# Patient Record
Sex: MALE | Race: BLACK OR AFRICAN AMERICAN | NOT HISPANIC OR LATINO | Employment: OTHER | URBAN - METROPOLITAN AREA
[De-identification: names, ages, dates, MRNs, and addresses within clinical notes are randomized per-mention and may not be internally consistent; named-entity substitution may affect disease eponyms.]

---

## 2017-01-17 ENCOUNTER — OFFICE VISIT (OUTPATIENT)
Dept: MEDICAL GROUP | Facility: MEDICAL CENTER | Age: 52
End: 2017-01-17
Payer: MEDICARE

## 2017-01-17 VITALS
TEMPERATURE: 98.1 F | HEART RATE: 86 BPM | BODY MASS INDEX: 22.67 KG/M2 | SYSTOLIC BLOOD PRESSURE: 112 MMHG | WEIGHT: 167.4 LBS | OXYGEN SATURATION: 99 % | HEIGHT: 72 IN | DIASTOLIC BLOOD PRESSURE: 76 MMHG

## 2017-01-17 DIAGNOSIS — Z12.11 COLON CANCER SCREENING: ICD-10-CM

## 2017-01-17 DIAGNOSIS — E11.9 DIABETES MELLITUS WITHOUT COMPLICATION (HCC): ICD-10-CM

## 2017-01-17 DIAGNOSIS — G40.909 SEIZURE DISORDER (HCC): ICD-10-CM

## 2017-01-17 LAB — GLUCOSE BLD-MCNC: 112 MG/DL (ref 70–100)

## 2017-01-17 PROCEDURE — 82962 GLUCOSE BLOOD TEST: CPT | Performed by: NURSE PRACTITIONER

## 2017-01-17 PROCEDURE — 99213 OFFICE O/P EST LOW 20 MIN: CPT | Performed by: NURSE PRACTITIONER

## 2017-01-17 RX ORDER — LEVETIRACETAM 500 MG/1
500 TABLET ORAL 2 TIMES DAILY
Qty: 60 TAB | Refills: 1 | Status: SHIPPED | OUTPATIENT
Start: 2017-01-17 | End: 2017-10-26

## 2017-01-17 ASSESSMENT — PATIENT HEALTH QUESTIONNAIRE - PHQ9: CLINICAL INTERPRETATION OF PHQ2 SCORE: 0
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
## 2017-01-17 NOTE — MR AVS SNAPSHOT
Haile Chavez   2017 2:40 PM   Office Visit   MRN: [id number]    Department:  [address] [address]   Dept Phone:  [phone]    Description:  Male : 1965   Provider:  ROSALEE Arreola           Reason for Visit     Diabetes Mellitus refill lantus, metformin    Seizure refill keppra      Allergies as of 2017     Allergen Noted Reactions    Hctz 2012       Hyponatremia      Other Drug 2010   Nausea    All anti-inflammitories,  Headache and body aches.    Vicodin [Hydrocodone-Acetaminophen] 2007   Vomiting    Headaches    Amoxicillin 2010   Nausea    Headache and body aches      You were diagnosed with     Type 2 diabetes mellitus with hyperosmolarity without coma, unspecified long term insulin use status (CMS-HCC)   [8365959]       Seizure disorder (CMS-HCC)   [755774]       Colon cancer screening   [500120]         Vital Signs     Blood Pressure Pulse Temperature Height Weight Body Mass Index    112/76 mmHg 86 36.7 °C (98.1 °F) 1.829 m (6') 75.932 kg (167 lb 6.4 oz) 22.70 kg/m2    Oxygen Saturation Smoking Status                99% Current Every Day Smoker          Basic Information     Date Of Birth Sex Race Ethnicity Preferred Language    1965 Male Black or  Non- English      Your appointments     2017  2:20 PM   Established Patient with Librado Colin M.D.   [facility] [address]    [address]  [facility] [address]  [facility] [address]   [phone]           You will be receiving a confirmation call a few days before your appointment from our automated call confirmation system.              Problem List              ICD-10-CM Priority Class Noted - Resolved    Schizo affective schizophrenia (CMS-HCC) F25.0 High  2010 - Present    HTN (hypertension), benign I10 Medium  2010 - Present    Tuberculosis A15.9   2010 - Present    Hepatitis C B19.20 Medium  2010 - Present    Erosive  esophagitis K22.10   Unknown - Present    Leukopenia D72.819 Low  11/8/2010 - Present    DM (diabetes mellitus) (CMS-HCC) E11.9   12/6/2011 - Present    GERD (gastroesophageal reflux disease) K21.9 Low  7/14/2013 - Present    Encounter for long-term (current) use of other medications Z79.899   1/29/2015 - Present    Transaminitis R74.0 Medium  6/5/2016 - Present    Schizophrenia (CMS-HCC) F20.9   10/14/2016 - Present    Homeless Z59.0   10/14/2016 - Present    Seizure disorder (CMS-HCC) G40.909   1/17/2017 - Present      Health Maintenance        Date Due Completion Dates    IMM HEP B VACCINE (3 of 3 - Twinrix 3-Dose Series) 12/30/2012 7/30/2012, 2/27/2012, 1/23/2012    RETINAL SCREENING 1/30/2014 1/30/2013 (Done), 1/28/2012 (Done)    Override on 1/30/2013: Done (avni)    Override on 1/28/2012: Done    COLON CANCER SCREENING ANNUAL FIT 5/21/2015 5/21/2014, 5/1/2013    URINE ACR / MICROALBUMIN 7/22/2015 7/22/2014, 1/6/2014, 10/11/2013, 11/29/2012, 6/28/2012    DIABETES MONOFILAMENT / LE EXAM 11/14/2015 11/14/2014, 10/21/2013, 4/30/2013 (Done), 3/28/2012 (Done)    Override on 4/30/2013: Done    Override on 3/28/2012: Done    A1C SCREENING 4/14/2017 10/14/2016, 1/29/2015, 7/22/2014, 1/6/2014, 10/11/2013, 4/30/2013, 11/29/2012, 6/28/2012, 1/5/2012, 11/14/2011, 8/29/2011    FASTING LIPID PROFILE 10/15/2017 10/15/2016, 7/22/2014, 1/6/2014, 11/29/2012, 6/28/2012, 11/14/2011, 8/17/2011, 9/3/2010, 5/20/2010, 4/29/2010    SERUM CREATININE 10/15/2017 10/15/2016, 10/14/2016, 10/10/2016, 6/18/2016, 6/6/2016, 6/5/2016, 6/5/2016, 6/4/2016, 6/3/2016, 2/14/2015, 7/22/2014, 3/1/2014, 1/6/2014, 10/11/2013, 4/30/2013, 12/28/2012, 11/29/2012, 10/24/2012, 9/24/2012, 8/27/2012, 6/28/2012, 6/28/2012, 5/31/2012, 5/4/2012, 5/2/2012, 4/17/2012, 3/28/2012, 11/19/2011, 11/18/2011, 11/17/2011, 11/16/2011, 11/16/2011, 11/16/2011, 11/15/2011, 11/14/2011, 11/2/2011, 8/29/2011, 8/17/2011, 11/7/2010, 11/7/2010, 9/4/2010, 9/2/2010, 5/20/2010,  4/29/2010, 9/2/2006, 6/19/2006    IMM DTaP/Tdap/Td Vaccine (2 - Td) 5/24/2022 5/24/2012            Results     POCT Glucose      Component Value Standard Range & Units    Glucose - Accu-Ck 112 70 - 100 mg/dL                        Current Immunizations     Hep A/HEP B Combined Vaccine (TwinRix) 7/30/2012  9:00 AM, 2/27/2012    Hepatitis B Vaccine Non-Recombivax (Ped/Adol) 1/23/2012  3:22 PM    Influenza TIV (IM) 10/21/2013  2:03 PM, 12/28/2012, 11/14/2011  5:00 PM, 11/8/2010  1:31 PM    Influenza Vaccine Quad Inj (Pf) 10/15/2016  1:52 PM    Influenza Vaccine Quad Inj (Preserved) 11/14/2014    Pneumococcal polysaccharide vaccine (PPSV-23) 9/3/2010  9:10 PM    Tdap Vaccine 5/24/2012  8:45 AM      Below and/or attached are the medications your provider expects you to take. Review all of your home medications and newly ordered medications with your provider and/or pharmacist. Follow medication instructions as directed by your provider and/or pharmacist. Please keep your medication list with you and share with your provider. Update the information when medications are discontinued, doses are changed, or new medications (including over-the-counter products) are added; and carry medication information at all times in the event of emergency situations     Allergies:  HCTZ - (reactions not documented)     OTHER DRUG - Nausea     VICODIN - Vomiting     AMOXICILLIN - Nausea               Medications  Valid as of: January 17, 2017 -  3:15 PM    Generic Name Brand Name Tablet Size Instructions for use    Aspirin (Chew Tab) ASA 81 MG Take 81 mg by mouth every day.        Benztropine Mesylate (Tab) COGENTIN 1 MG Take 1 mg by mouth 2 times a day.        DiphenhydrAMINE HCl   Take 1 Tab by mouth every day.        Haloperidol (Tab) HALDOL 5 MG Take 5-10 mg by mouth. Take 5 mg by mouth every morning, then take 10 mg every afternoon        Haloperidol Decanoate (Solution) HALDOL DECANOATE 50 MG/ML by Intramuscular route every 30 days.          LevETIRAcetam (Tab) KEPPRA 500 MG Take 1 Tab by mouth 2 times a day.        MetFORMIN HCl (Tab) GLUCOPHAGE 500 MG Take 1 Tab by mouth every day.        Ziprasidone HCl (Cap) GEODON 80 MG Take 160 mg by mouth every bedtime.        .                 Medicines prescribed today were sent to:     [facility] [address] [place] [address], [place] - [address]    [address] [place] [address]    Phone: [phone] Fax: [phone]    Open 24 Hours?: No      Medication refill instructions:       If your prescription bottle indicates you have medication refills left, it is not necessary to call your provider’s office. Please contact your pharmacy and they will refill your medication.    If your prescription bottle indicates you do not have any refills left, you may request refills at any time through one of the following ways: The online [employer] system (except Urgent Care), by calling your provider’s office, or by asking your pharmacy to contact your provider’s office with a refill request. Medication refills are processed only during regular business hours and may not be available until the next business day. Your provider may request additional information or to have a follow-up visit with you prior to refilling your medication.   *Please Note: Medication refills are assigned a new Rx number when refilled electronically. Your pharmacy may indicate that no refills were authorized even though a new prescription for the same medication is available at the pharmacy. Please request the medicine by name with the pharmacy before contacting your provider for a refill.        Your To Do List     Future Labs/Procedures Complete By Expires    COMP METABOLIC PANEL  As directed 1/18/2018    HEMOGLOBIN A1C  As directed 1/18/2018    MICROALBUMIN CREAT RATIO URINE  As directed 1/18/2018    OCCULT BLOOD FECES IMMUNOASSAY  As directed 1/18/2018         [employer] Access Code: [email or web address]  Expires: 2/16/2017  3:15 PM    [employer]  DARLIN secure,  online tool to manage your health information     [employer]’s [employer]® is a secure, online tool that connects you to your personalized health information from the privacy of your home -- day or night - making it very easy for you to manage your healthcare. Once the activation process is completed, you can even access your medical information using the [employer] selin, which is available for free in the Apple Selin store or Google Play store.     [employer] provides the following levels of access (as shown below):   My Chart Features   [facility] Doctor [facility]  Specialists [facility]  Urgent  Care Non-[facility]  Primary Care  Doctor   Email your healthcare team securely and privately 24/7 X X X    Manage appointments: schedule your next appointment; view details of past/upcoming appointments X      Request prescription refills. X      View recent personal medical records, including lab and immunizations X X X X   View health record, including health history, allergies, medications X X X X   Read reports about your outpatient visits, procedures, consult and ER notes X X X X   See your discharge summary, which is a recap of your hospital and/or ER visit that includes your diagnosis, lab results, and care plan. X X       How to register for [employer]:  1. Go to  [email or web address].  2. Click on the Sign Up Now box, which takes you to the New Member Sign Up page. You will need to provide the following information:  a. Enter your [employer] Access Code exactly as it appears at the top of this page. (You will not need to use this code after you’ve completed the sign-up process. If you do not sign up before the expiration date, you must request a new code.)   b. Enter your date of birth.   c. Enter your home email address.   d. Click Submit, and follow the next screen’s instructions.  3. Create a [employer] ID. This will be your [employer] login ID and cannot be changed, so think of one that is secure and easy to  remember.  4. Create a [employer] password. You can change your password at any time.  5. Enter your Password Reset Question and Answer. This can be used at a later time if you forget your password.   6. Enter your e-mail address. This allows you to receive e-mail notifications when new information is available in [employer].  7. Click Sign Up. You can now view your health information.    For assistance activating your [employer] account, call [phone]

## 2017-02-06 ENCOUNTER — HOSPITAL ENCOUNTER (EMERGENCY)
Facility: MEDICAL CENTER | Age: 52
End: 2017-02-06
Attending: EMERGENCY MEDICINE
Payer: MEDICARE

## 2017-02-06 ENCOUNTER — APPOINTMENT (OUTPATIENT)
Dept: RADIOLOGY | Facility: MEDICAL CENTER | Age: 52
End: 2017-02-06
Attending: EMERGENCY MEDICINE
Payer: MEDICARE

## 2017-02-06 VITALS
TEMPERATURE: 99 F | WEIGHT: 174.82 LBS | BODY MASS INDEX: 23.71 KG/M2 | HEART RATE: 83 BPM | OXYGEN SATURATION: 94 % | RESPIRATION RATE: 16 BRPM | SYSTOLIC BLOOD PRESSURE: 124 MMHG | DIASTOLIC BLOOD PRESSURE: 89 MMHG

## 2017-02-06 DIAGNOSIS — S93.401A SPRAIN OF RIGHT ANKLE, UNSPECIFIED LIGAMENT, INITIAL ENCOUNTER: ICD-10-CM

## 2017-02-06 PROCEDURE — 99284 EMERGENCY DEPT VISIT MOD MDM: CPT

## 2017-02-06 PROCEDURE — 73610 X-RAY EXAM OF ANKLE: CPT | Mod: RT

## 2017-02-06 ASSESSMENT — LIFESTYLE VARIABLES: DO YOU DRINK ALCOHOL: NO

## 2017-02-06 ASSESSMENT — PAIN SCALES - GENERAL: PAINLEVEL_OUTOF10: 7

## 2017-02-06 NOTE — ED AVS SNAPSHOT
Home Care Instructions                                                                                                                Haile Chavez   MRN: [id number]    Department:  [facility], Emergency Dept   Date of Visit:  2/6/2017            [facility], Emergency Dept    [address]    [facility] [address]    Phone:  [phone]      You were seen by     Dilan Whelan M.D.      Your Diagnosis Was     Sprain of right ankle, unspecified ligament, initial encounter     S93.401A       Follow-up Information     1. Follow up with Abraham Mcintosh M.D..    Specialty:  Orthopaedics    Why:  As needed    Contact information    [address]  [address]  [facility] [address]  [phone]        Medication Information     Review all of your home medications and newly ordered medications with your primary doctor and/or pharmacist as soon as possible. Follow medication instructions as directed by your doctor and/or pharmacist.     Please keep your complete medication list with you and share with your physician. Update the information when medications are discontinued, doses are changed, or new medications (including over-the-counter products) are added; and carry medication information at all times in the event of emergency situations.               Medication List      ASK your doctor about these medications        Instructions    aspirin 81 MG Chew chewable tablet   Commonly known as:  ASA    Take 81 mg by mouth every day.   Dose:  81 mg       benztropine 1 MG Tabs   Commonly known as:  COGENTIN    Take 1 mg by mouth 2 times a day.   Dose:  1 mg       DIPHENHYDRAMINE HCL PO    Take 1 Tab by mouth every day.   Dose:  1 Tab       GEODON 80 MG Caps   Generic drug:  ziprasidone    Take 160 mg by mouth every bedtime.   Dose:  160 mg       haloperidol 5 MG Tabs   Commonly known as:  HALDOL    Take 5-10 mg by mouth. Take 5 mg by mouth every morning, then take 10 mg every afternoon      Dose:  5-10 mg       haloperidol decanoate 50 MG/ML Soln   Commonly known as:  HALDOL DECANOATE    by Intramuscular route every 30 days.       levetiracetam 500 MG Tabs   Commonly known as:  KEPPRA    Take 1 Tab by mouth 2 times a day.   Dose:  500 mg       metformin 500 MG Tabs   Commonly known as:  GLUCOPHAGE    Take 1 Tab by mouth every day.   Dose:  500 mg               Procedures and tests performed during your visit     DX-ANKLE 3+ VIEWS RIGHT    SPLINT APPLICATION        Discharge Instructions       Ankle Sprain  An ankle sprain is an injury to the strong, fibrous tissues (ligaments) that hold the bones of your ankle joint together.   CAUSES  An ankle sprain is usually caused by a fall or by twisting your ankle. Ankle sprains most commonly occur when you step on the outer edge of your foot, and your ankle turns inward. People who participate in sports are more prone to these types of injuries.   SYMPTOMS   · Pain in your ankle. The pain may be present at rest or only when you are trying to stand or walk.  · Swelling.  · Bruising. Bruising may develop immediately or within 1 to 2 days after your injury.  · Difficulty standing or walking, particularly when turning corners or changing directions.  DIAGNOSIS   Your caregiver will ask you details about your injury and perform a physical exam of your ankle to determine if you have an ankle sprain. During the physical exam, your caregiver will press on and apply pressure to specific areas of your foot and ankle. Your caregiver will try to move your ankle in certain ways. An X-ray exam may be done to be sure a bone was not broken or a ligament did not separate from one of the bones in your ankle (avulsion fracture).   TREATMENT   Certain types of braces can help stabilize your ankle. Your caregiver can make a recommendation for this. Your caregiver may recommend the use of medicine for pain. If your sprain is severe, your caregiver may refer you to a surgeon who  helps to restore function to parts of your skeletal system (orthopedist) or a physical therapist.  HOME CARE INSTRUCTIONS   · Apply ice to your injury for 1-2 days or as directed by your caregiver. Applying ice helps to reduce inflammation and pain.  ¨ Put ice in a plastic bag.  ¨ Place a towel between your skin and the bag.  ¨ Leave the ice on for 15-20 minutes at a time, every 2 hours while you are awake.  · Only take over-the-counter or prescription medicines for pain, discomfort, or fever as directed by your caregiver.  · Elevate your injured ankle above the level of your heart as much as possible for 2-3 days.  · If your caregiver recommends crutches, use them as instructed. Gradually put weight on the affected ankle. Continue to use crutches or a cane until you can walk without feeling pain in your ankle.  · If you have a plaster splint, wear the splint as directed by your caregiver. Do not rest it on anything harder than a pillow for the first 24 hours. Do not put weight on it. Do not get it wet. You may take it off to take a shower or bath.  · You may have been given an elastic bandage to wear around your ankle to provide support. If the elastic bandage is too tight (you have numbness or tingling in your foot or your foot becomes cold and blue), adjust the bandage to make it comfortable.  · If you have an air splint, you may blow more air into it or let air out to make it more comfortable. You may take your splint off at night and before taking a shower or bath. Wiggle your toes in the splint several times per day to decrease swelling.  SEEK MEDICAL CARE IF:   · You have rapidly increasing bruising or swelling.  · Your toes feel extremely cold or you lose feeling in your foot.  · Your pain is not relieved with medicine.  SEEK IMMEDIATE MEDICAL CARE IF:  · Your toes are numb or blue.  · You have severe pain that is increasing.  MAKE SURE YOU:   · Understand these instructions.  · Will watch your  condition.  · Will get help right away if you are not doing well or get worse.     This information is not intended to replace advice given to you by your health care provider. Make sure you discuss any questions you have with your health care provider.     Document Released: 12/18/2006 Document Revised: 01/08/2016 Document Reviewed: 12/29/2012  [employer] Interactive Patient Education ©2016 [employer]            Patient Information     Patient Information    Following emergency treatment: all patient requiring follow-up care must return either to a private physician or a clinic if your condition worsens before you are able to obtain further medical attention, please return to the emergency room.     Billing Information    At [facility], we work to make the billing process streamlined for our patients.  Our Representatives are here to answer any questions you may have regarding your hospital bill.  If you have insurance coverage and have supplied your insurance information to us, we will submit a claim to your insurer on your behalf.  Should you have any questions regarding your bill, we can be reached online or by phone as follows:  Online: You are able pay your bills online or live chat with our representatives about any billing questions you may have. We are here to help Monday - Friday from 8:00am to 7:30pm and 9:00am - 12:00pm on Saturdays.  Please visit [email or web address]  for more information.   Phone:  [phone] or [phone]    Please note that your emergency physician, surgeon, pathologist, radiologist, anesthesiologist, and other specialists are not employed by [facility] and will therefore bill separately for their services.  Please contact them directly for any questions concerning their bills at the numbers below:     Emergency Physician Services:  [phone]  [facility]:  [phone]  [facility]:  [phone]  [facility]:  [phone]    1. Your final bill may vary from the amount quoted upon discharge if all procedures are not complete at that time, or if your doctor has additional procedures of which we are not aware. You will receive an additional bill if you return to the Emergency Department at [facility] for suture removal regardless of the facility of which the sutures were placed.     2. Please arrange for settlement of this account at the emergency registration.    3. All self-pay accounts are due in full at the time of treatment.  If you are unable to meet this obligation then payment is expected within 4-5 days.     4. If you have had radiology studies (CT, X-ray, Ultrasound, MRI), you have received a preliminary result during your emergency department visit. Please contact the radiology department [phone] to receive a copy of your final result. Please discuss the Final result with your primary physician or with the follow up physician provided.     Crisis Hotline:  [place] Crisis Hotline:  8-847-PALORDT or 1-890.695.6887  [place] Crisis Hotline:    1-840.205.5005 or 722-282-5265         ED Discharge Follow Up Questions    1. In order to provide you with very good care, we would like to follow up with a phone call in the next few days.  May we have your permission to contact you?     YES /  NO    2. What is the best phone number to call you? (       )_____-__________    3. What is the best time to call you?      Morning  /  Afternoon  /  Evening                   Patient Signature:  ____________________________________________________________    Date:  ____________________________________________________________      Your appointments     Feb 21, 2017  2:20 PM   Established Patient with Librado Colin M.D.   [facility] Dariana Stout ([address])    [address]  [facility] [address]  [place] [address]   [phone]           You will be receiving a confirmation call a few days before your  appointment from our automated call confirmation system.

## 2017-02-06 NOTE — ED NOTES
Chief Complaint   Patient presents with   • Ankle Pain     Pt BIB self to triage for c/o right ankle pain. Pt reports pain started Sat morning, deneis truama. CMS intact, ROM limited due to pain.     Explained to pt triage process, made pt aware to tell this RN of any changes/concerns, pt verbalized understanding of process and instructions given. Pt to ER lobby.

## 2017-02-06 NOTE — ED AVS SNAPSHOT
RidePal Access Code: [email or web address]  Expires: 2/16/2017  3:15 PM    Your email address is not on file at [facility].  Email Addresses are required for you to sign up for RidePal, please contact [phone] to verify your personal information and to provide your email address prior to attempting to register for RidePal.    Haile Chavez  [address] [place] [address]    RidePal  A secure, online tool to manage your health information     [facility]’s RidePal® is a secure, online tool that connects you to your personalized health information from the privacy of your home -- day or night - making it very easy for you to manage your healthcare. Once the activation process is completed, you can even access your medical information using the RidePal selin, which is available for free in the Apple Selin store or Google Play store.     To learn more about RidePal, visit [email or web address]    There are two levels of access available (as shown below):   My Chart Features  [facility] Primary Care Doctor [facility]  Specialists [facility]  Urgent  Care Non-[facility] Primary Care Doctor   Email your healthcare team securely and privately 24/7 X X X    Manage appointments: schedule your next appointment; view details of past/upcoming appointments X      Request prescription refills. X      View recent personal medical records, including lab and immunizations X X X X   View health record, including health history, allergies, medications X X X X   Read reports about your outpatient visits, procedures, consult and ER notes X X X X   See your discharge summary, which is a recap of your hospital and/or ER visit that includes your diagnosis, lab results, and care plan X X  X     How to register for [facility]:  Once your e-mail address has been verified, follow the following steps to sign up for RidePal.     1. Go to  [email or web address]  2. Click on the Sign Up Now box, which takes you to the New Member Sign Up page. You  will need to provide the following information:  a. Enter your [facility] Access Code exactly as it appears at the top of this page. (You will not need to use this code after you’ve completed the sign-up process. If you do not sign up before the expiration date, you must request a new code.)   b. Enter your date of birth.   c. Enter your home email address.   d. Click Submit, and follow the next screen’s instructions.  3. Create a [employer] ID. This will be your [facility] login ID and cannot be changed, so think of one that is secure and easy to remember.  4. Create a [facility] password. You can change your password at any time.  5. Enter your Password Reset Question and Answer. This can be used at a later time if you forget your password.   6. Enter your e-mail address. This allows you to receive e-mail notifications when new information is available in [facility].  7. Click Sign Up. You can now view your health information.    For assistance activating your [facility] account, call [phone]

## 2017-02-06 NOTE — DISCHARGE INSTRUCTIONS
Ankle Sprain  An ankle sprain is an injury to the strong, fibrous tissues (ligaments) that hold the bones of your ankle joint together.   CAUSES  An ankle sprain is usually caused by a fall or by twisting your ankle. Ankle sprains most commonly occur when you step on the outer edge of your foot, and your ankle turns inward. People who participate in sports are more prone to these types of injuries.   SYMPTOMS   · Pain in your ankle. The pain may be present at rest or only when you are trying to stand or walk.  · Swelling.  · Bruising. Bruising may develop immediately or within 1 to 2 days after your injury.  · Difficulty standing or walking, particularly when turning corners or changing directions.  DIAGNOSIS   Your caregiver will ask you details about your injury and perform a physical exam of your ankle to determine if you have an ankle sprain. During the physical exam, your caregiver will press on and apply pressure to specific areas of your foot and ankle. Your caregiver will try to move your ankle in certain ways. An X-ray exam may be done to be sure a bone was not broken or a ligament did not separate from one of the bones in your ankle (avulsion fracture).   TREATMENT   Certain types of braces can help stabilize your ankle. Your caregiver can make a recommendation for this. Your caregiver may recommend the use of medicine for pain. If your sprain is severe, your caregiver may refer you to a surgeon who helps to restore function to parts of your skeletal system (orthopedist) or a physical therapist.  HOME CARE INSTRUCTIONS   · Apply ice to your injury for 1-2 days or as directed by your caregiver. Applying ice helps to reduce inflammation and pain.  ¨ Put ice in a plastic bag.  ¨ Place a towel between your skin and the bag.  ¨ Leave the ice on for 15-20 minutes at a time, every 2 hours while you are awake.  · Only take over-the-counter or prescription medicines for pain, discomfort, or fever as directed by  your caregiver.  · Elevate your injured ankle above the level of your heart as much as possible for 2-3 days.  · If your caregiver recommends crutches, use them as instructed. Gradually put weight on the affected ankle. Continue to use crutches or a cane until you can walk without feeling pain in your ankle.  · If you have a plaster splint, wear the splint as directed by your caregiver. Do not rest it on anything harder than a pillow for the first 24 hours. Do not put weight on it. Do not get it wet. You may take it off to take a shower or bath.  · You may have been given an elastic bandage to wear around your ankle to provide support. If the elastic bandage is too tight (you have numbness or tingling in your foot or your foot becomes cold and blue), adjust the bandage to make it comfortable.  · If you have an air splint, you may blow more air into it or let air out to make it more comfortable. You may take your splint off at night and before taking a shower or bath. Wiggle your toes in the splint several times per day to decrease swelling.  SEEK MEDICAL CARE IF:   · You have rapidly increasing bruising or swelling.  · Your toes feel extremely cold or you lose feeling in your foot.  · Your pain is not relieved with medicine.  SEEK IMMEDIATE MEDICAL CARE IF:  · Your toes are numb or blue.  · You have severe pain that is increasing.  MAKE SURE YOU:   · Understand these instructions.  · Will watch your condition.  · Will get help right away if you are not doing well or get worse.     This information is not intended to replace advice given to you by your health care provider. Make sure you discuss any questions you have with your health care provider.     Document Released: 12/18/2006 Document Revised: 01/08/2016 Document Reviewed: 12/29/2012  [employer] Interactive Patient Education ©2016 [employer]

## 2017-02-06 NOTE — ED PROVIDER NOTES
ED Provider Note    Scribed for Dr. Dilan Whelan M.D. by Librado Galvin. 2/6/2017  11:18 AM    Primary care provider: Librado Colin M.D.  Means of arrival: Walk In  History obtained from: Patient  History limited by: None    CHIEF COMPLAINT  Chief Complaint   Patient presents with   • Ankle Pain     Pt BIB self to triage for c/o right ankle pain. Pt reports pain started Sat morning, deneis truama. CMS intact, ROM limited due to pain.       HPI  Haile Chavez is a 52 y.o. male who presents to the Emergency Department for worsening foot pain, onset 2 days ago. Patient reports waking up with severe ankle pain on 2/4/2017. Pain is localized to right foot and is non radiating. He states pain is 8/10 in severity and exacerbated when walking. There is associated swelling. Patient does have history of arthritis but states this pain is different. There is no previous injury to right foot. Denies trauma or fall.     REVIEW OF SYSTEMS  Pertinent positives include right foot pain, right foot swelling. Pertinent negatives include no trauma or fall.   See HPI for further details.     PAST MEDICAL HISTORY   has a past medical history of Psychiatric disorder; Schizo affective schizophrenia (CMS-HCC) (4/22/2010); Tuberculosis (4/22/1992); Hepatitis C (5/24/1996); HTN (hypertension), benign (4/22/2008); and Angina.    SURGICAL HISTORY   has past surgical history that includes gastroscopy with biopsy (9/3/2010); other abdominal surgery (1982); gastroscopy with biopsy (11/7/2010); and other orthopedic surgery (2000).    SOCIAL HISTORY  Social History   Substance Use Topics   • Smoking status: Current Every Day Smoker -- 0.25 packs/day for 30 years     Types: Cigarettes   • Smokeless tobacco: Former User     Quit date: 11/14/2011      Comment: 3/4 ppd x 30 years   • Alcohol Use: No      History   Drug Use No       FAMILY HISTORY  Family History   Problem Relation Age of Onset   • Genetic Neg Hx        CURRENT MEDICATIONS  Home  Medications     Reviewed by Krista Pride R.N. (Registered Nurse) on 02/06/17 at 1117  Med List Status: Partial    Medication Last Dose Status    aspirin (ASA) 81 MG Chew Tab chewable tablet  Active    benztropine (COGENTIN) 1 MG Tab 10/16/2016 Active    DIPHENHYDRAMINE HCL PO 10/16/2016 Active    haloperidol (HALDOL) 5 MG Tab  Active    haloperidol decanoate (HALDOL DECANOATE) 50 MG/ML Solution 10/16/2016 Active    levetiracetam (KEPPRA) 500 MG Tab  Active    metformin (GLUCOPHAGE) 500 MG Tab  Active    ziprasidone (GEODON) 80 MG Cap  Active                ALLERGIES  Allergies   Allergen Reactions   • Hctz      Hyponatremia     • Other Drug Nausea     All anti-inflammitories,  Headache and body aches.   • Vicodin [Hydrocodone-Acetaminophen] Vomiting     Headaches   • Amoxicillin Nausea     Headache and body aches       PHYSICAL EXAM  VITAL SIGNS: /82 mmHg  Pulse 106  Temp(Src) 37.2 °C (99 °F)  Resp 20  Wt 79.3 kg (174 lb 13.2 oz)  SpO2 98%    Constitutional: Well developed, Well nourished, No distress, Non-toxic appearance.      Skin: Warm, Dry, No erythema, No rash.   Extremities:, Tenderness and swelling to lateral aspect of right foot. And ankle   Musculoskeletal: No major deformities noted.  Intact distal pulses  Neurologic: Awake, alert. Moves all extremities spontaneously.  Psychiatric: Affect normal, Judgment normal, Mood normal.     RADIOLOGY  DX-ANKLE 3+ VIEWS RIGHT   Final Result      Mild soft tissue swelling. No acute fracture.        The radiologist's interpretation of all radiological studies have been reviewed by me.    COURSE & MEDICAL DECISION MAKING  Pertinent Labs & Imaging studies reviewed. (See chart for details)    11:18 AM - Patient seen and examined at bedside.  Ordered right ankle x-ray to evaluate his symptoms. The differential diagnoses include but are not limited to: fracture vs sprain.    11:59 AM - X-ray at bedside.     12:57 PM - Recheck patient. Informed there is no  evidence of fracture but imaging does show soft tissue swelling. Advised he will be discharged with brace and to limit amount of time spent of right foot.      Decision Making:  This is seems likely that the sprain is swelling laterally pain with inversion. We'll put in an Aircast splint advised him to rest the ankle and referred for orthopedics if needed    The patient will return for new or worsening symptoms and is stable at the time of discharge.    DISPOSITION:  Patient will be discharged home in stable condition.    FOLLOW UP:  Abraham Mcintosh M.D.  [address] [facility] [address]  [address]  [facility] [address]  [phone]      As needed      OUTPATIENT MEDICATIONS:  New Prescriptions    No medications on file     FINAL IMPRESSION  1. Sprain of right ankle, unspecified ligament, initial encounter          Librado PAGE (Scribe), am scribing for, and in the presence of, Dilan Whelan M.D..    Electronically signed by: Librado Galvin (Maciejibkandace), 2/6/2017    Dilan PAGE M.D. personally performed the services described in this documentation, as scribed by Librado Galvin in my presence, and it is both accurate and complete.    The note accurately reflects work and decisions made by me.  Dilan Whelan  2/6/2017  1:05 PM

## 2017-02-06 NOTE — ED NOTES
Haile Chavez D/C'd.  Discharge instructions including s/s to return to ED, follow up appointments, hydration importance and specific instructions regarding ankle sprain  provided to pt.    Pt verbalized understanding with no further questions and concerns.    Copy of discharge provided to pt.  Signed copy in chart.      Pt ambulatory out of department using crutches; pt in NAD, awake, alert, interactive and age appropriate.

## 2017-02-06 NOTE — ED AVS SNAPSHOT
2/6/2017          Haile Chavez  [address]  [facility] [address]    Dear Haile:    [facility] wants to ensure your discharge home is safe and you or your loved ones have had all your questions answered regarding your care after you leave the hospital.    You may receive a telephone call within two days of your discharge.  This call is to make certain you understand your discharge instructions as well as ensure we provided you with the best care possible during your stay with us.     The call will only last approximately 3-5 minutes and will be done by a nurse.    Once again, we want to ensure your discharge home is safe and that you have a clear understanding of any next steps in your care.  If you have any questions or concerns, please do not hesitate to contact us, we are here for you.  Thank you for choosing [facility] for your healthcare needs.    Sincerely,    Barrington Montalvo    [facility]

## 2017-03-13 ENCOUNTER — HOSPITAL ENCOUNTER (OUTPATIENT)
Facility: MEDICAL CENTER | Age: 52
End: 2017-03-13
Attending: NURSE PRACTITIONER
Payer: MEDICARE

## 2017-03-13 ENCOUNTER — HOSPITAL ENCOUNTER (OUTPATIENT)
Dept: LAB | Facility: MEDICAL CENTER | Age: 52
End: 2017-03-13
Attending: NURSE PRACTITIONER
Payer: MEDICARE

## 2017-03-13 DIAGNOSIS — E11.9 DIABETES MELLITUS WITHOUT COMPLICATION (HCC): ICD-10-CM

## 2017-03-13 LAB
ALBUMIN SERPL BCP-MCNC: 4.3 G/DL (ref 3.2–4.9)
ALBUMIN/GLOB SERPL: 1.2 G/DL
ALP SERPL-CCNC: 94 U/L (ref 30–99)
ALT SERPL-CCNC: 15 U/L (ref 2–50)
ANION GAP SERPL CALC-SCNC: 7 MMOL/L (ref 0–11.9)
AST SERPL-CCNC: 19 U/L (ref 12–45)
BILIRUB SERPL-MCNC: 0.7 MG/DL (ref 0.1–1.5)
BUN SERPL-MCNC: 11 MG/DL (ref 8–22)
CALCIUM SERPL-MCNC: 9.4 MG/DL (ref 8.5–10.5)
CHLORIDE SERPL-SCNC: 102 MMOL/L (ref 96–112)
CO2 SERPL-SCNC: 25 MMOL/L (ref 20–33)
CREAT SERPL-MCNC: 0.82 MG/DL (ref 0.5–1.4)
CREAT UR-MCNC: 22.7 MG/DL
EST. AVERAGE GLUCOSE BLD GHB EST-MCNC: 114 MG/DL
GLOBULIN SER CALC-MCNC: 3.5 G/DL (ref 1.9–3.5)
GLUCOSE SERPL-MCNC: 86 MG/DL (ref 65–99)
HBA1C MFR BLD: 5.6 % (ref 0–5.6)
MICROALBUMIN UR-MCNC: <0.7 MG/DL
MICROALBUMIN/CREAT UR: NORMAL MG/G (ref 0–30)
POTASSIUM SERPL-SCNC: 4 MMOL/L (ref 3.6–5.5)
PROT SERPL-MCNC: 7.8 G/DL (ref 6–8.2)
SODIUM SERPL-SCNC: 134 MMOL/L (ref 135–145)

## 2017-03-13 PROCEDURE — 82570 ASSAY OF URINE CREATININE: CPT

## 2017-03-13 PROCEDURE — 80053 COMPREHEN METABOLIC PANEL: CPT

## 2017-03-13 PROCEDURE — 36415 COLL VENOUS BLD VENIPUNCTURE: CPT | Mod: GA

## 2017-03-13 PROCEDURE — 82274 ASSAY TEST FOR BLOOD FECAL: CPT

## 2017-03-13 PROCEDURE — 82043 UR ALBUMIN QUANTITATIVE: CPT

## 2017-03-13 PROCEDURE — 83036 HEMOGLOBIN GLYCOSYLATED A1C: CPT | Mod: GA

## 2017-03-17 DIAGNOSIS — Z12.11 COLON CANCER SCREENING: ICD-10-CM

## 2017-03-17 LAB — HEMOCCULT STL QL IA: NEGATIVE

## 2017-03-21 PROBLEM — R73.02 IGT (IMPAIRED GLUCOSE TOLERANCE): Status: ACTIVE | Noted: 2017-03-21

## 2017-03-22 ENCOUNTER — OFFICE VISIT (OUTPATIENT)
Dept: MEDICAL GROUP | Facility: MEDICAL CENTER | Age: 52
End: 2017-03-22
Payer: MEDICARE

## 2017-03-22 VITALS
SYSTOLIC BLOOD PRESSURE: 122 MMHG | OXYGEN SATURATION: 100 % | BODY MASS INDEX: 25.33 KG/M2 | RESPIRATION RATE: 16 BRPM | HEART RATE: 94 BPM | HEIGHT: 72 IN | DIASTOLIC BLOOD PRESSURE: 76 MMHG | TEMPERATURE: 97.8 F | WEIGHT: 187 LBS

## 2017-03-22 DIAGNOSIS — G40.909 SEIZURE DISORDER (HCC): ICD-10-CM

## 2017-03-22 DIAGNOSIS — F25.9 SCHIZO AFFECTIVE SCHIZOPHRENIA (HCC): ICD-10-CM

## 2017-03-22 DIAGNOSIS — Z00.00 MEDICARE ANNUAL WELLNESS VISIT, SUBSEQUENT: ICD-10-CM

## 2017-03-22 DIAGNOSIS — K21.9 GASTROESOPHAGEAL REFLUX DISEASE WITHOUT ESOPHAGITIS: ICD-10-CM

## 2017-03-22 DIAGNOSIS — F20.9 SCHIZOPHRENIA, UNSPECIFIED TYPE (HCC): ICD-10-CM

## 2017-03-22 DIAGNOSIS — I10 HTN (HYPERTENSION), BENIGN: ICD-10-CM

## 2017-03-22 DIAGNOSIS — E11.9 DIABETES MELLITUS WITHOUT COMPLICATION (HCC): ICD-10-CM

## 2017-03-22 DIAGNOSIS — R73.02 IGT (IMPAIRED GLUCOSE TOLERANCE): ICD-10-CM

## 2017-03-22 DIAGNOSIS — B19.20 HEPATITIS C VIRUS INFECTION WITHOUT HEPATIC COMA, UNSPECIFIED CHRONICITY: ICD-10-CM

## 2017-03-22 PROCEDURE — G0439 PPPS, SUBSEQ VISIT: HCPCS | Performed by: INTERNAL MEDICINE

## 2017-03-22 PROCEDURE — 3046F HEMOGLOBIN A1C LEVEL >9.0%: CPT | Mod: 8P | Performed by: INTERNAL MEDICINE

## 2017-03-22 PROCEDURE — 4004F PT TOBACCO SCREEN RCVD TLK: CPT | Performed by: INTERNAL MEDICINE

## 2017-03-22 PROCEDURE — G8510 SCR DEP NEG, NO PLAN REQD: HCPCS | Performed by: INTERNAL MEDICINE

## 2017-03-22 PROCEDURE — G8419 CALC BMI OUT NRM PARAM NOF/U: HCPCS | Performed by: INTERNAL MEDICINE

## 2017-03-22 PROCEDURE — G8482 FLU IMMUNIZE ORDER/ADMIN: HCPCS | Performed by: INTERNAL MEDICINE

## 2017-03-22 RX ORDER — OLANZAPINE 10 MG/1
TABLET, ORALLY DISINTEGRATING ORAL
Refills: 2 | COMMUNITY
Start: 2017-02-28 | End: 2018-06-12

## 2017-03-22 ASSESSMENT — PATIENT HEALTH QUESTIONNAIRE - PHQ9: CLINICAL INTERPRETATION OF PHQ2 SCORE: 0

## 2017-03-22 NOTE — MR AVS SNAPSHOT
Haile Chavez   3/22/2017 11:40 AM   Office Visit   MRN: [id number]    Department:  [address] [place] [address]   Dept Phone:  [phone]    Description:  Male : 1965   Provider:  Librado Colin M.D.           Reason for Visit     Annual Exam Labs      Allergies as of 3/22/2017     Allergen Noted Reactions    Hctz 2012       Hyponatremia      Other Drug 2010   Nausea    All anti-inflammitories,  Headache and body aches.    Vicodin [Hydrocodone-Acetaminophen] 2007   Vomiting    Headaches    Amoxicillin 2010   Nausea    Headache and body aches      You were diagnosed with     Medicare annual wellness visit, subsequent   [965614]       IGT (impaired glucose tolerance)   [316588]       Schizo affective schizophrenia (CMS-HCC)   [954893]       HTN (hypertension), benign   [736693]       Hepatitis C virus infection without hepatic coma, unspecified chronicity   [8788753]       Gastroesophageal reflux disease without esophagitis   [480835]       Schizophrenia, unspecified type (CMS-HCC)   [0859926]       Seizure disorder (CMS-HCC)   [593489]       Diabetes mellitus without complication (CMS-HCC)   [714882]         Vital Signs     Blood Pressure Pulse Temperature Respirations Height Weight    122/76 mmHg 94 36.6 °C (97.8 °F) 16 1.829 m (6') 84.823 kg (187 lb)    Body Mass Index Oxygen Saturation Smoking Status             25.36 kg/m2 100% Current Every Day Smoker         Basic Information     Date Of Birth Sex Race Ethnicity Preferred Language    1965 Male Black or  Non- English      Problem List              ICD-10-CM Priority Class Noted - Resolved    Schizo affective schizophrenia (CMS-HCC) F25.0 High  2010 - Present    HTN (hypertension), benign I10 Medium  2010 - Present    Tuberculosis A15.9   2010 - Present    Hepatitis C B19.20 Medium  2010 - Present    GERD (gastroesophageal reflux disease) K21.9 Low  2013 - Present    Encounter for long-term (current) use of other medications Z79.899   1/29/2015 - Present    Schizophrenia (CMS-HCC) F20.9   10/14/2016 - Present    Seizure disorder (CMS-HCC) G40.909   1/17/2017 - Present    IGT (impaired glucose tolerance) R73.02   3/21/2017 - Present      Health Maintenance        Date Due Completion Dates    IMM HEP B VACCINE (3 of 3 - Twinrix 3-Dose Series) 12/30/2012 7/30/2012, 2/27/2012, 1/23/2012    COLON CANCER SCREENING ANNUAL FIT 3/13/2018 3/13/2017, 5/21/2014, 5/1/2013    IMM DTaP/Tdap/Td Vaccine (2 - Td) 5/24/2022 5/24/2012            Current Immunizations     Hep A/HEP B Combined Vaccine (TwinRix) 7/30/2012  9:00 AM, 2/27/2012    Hepatitis B Vaccine Non-Recombivax (Ped/Adol) 1/23/2012  3:22 PM    Influenza TIV (IM) 10/21/2013  2:03 PM, 12/28/2012, 11/14/2011  5:00 PM, 11/8/2010  1:31 PM    Influenza Vaccine Quad Inj (Pf) 10/15/2016  1:52 PM    Influenza Vaccine Quad Inj (Preserved) 11/14/2014    OPV - Historical Data 1/16/1978    Pneumococcal polysaccharide vaccine (PPSV-23) 9/3/2010  9:10 PM    Tdap Vaccine 5/24/2012  8:45 AM      Below and/or attached are the medications your provider expects you to take. Review all of your home medications and newly ordered medications with your provider and/or pharmacist. Follow medication instructions as directed by your provider and/or pharmacist. Please keep your medication list with you and share with your provider. Update the information when medications are discontinued, doses are changed, or new medications (including over-the-counter products) are added; and carry medication information at all times in the event of emergency situations     Allergies:  HCTZ - (reactions not documented)     OTHER DRUG - Nausea     VICODIN - Vomiting     AMOXICILLIN - Nausea               Medications  Valid as of: March 22, 2017 - 11:46 AM    Generic Name Brand Name Tablet Size Instructions for use    Aspirin (Chew Tab) ASA 81 MG Take 81 mg by mouth every day.          Benztropine Mesylate (Tab) COGENTIN 1 MG Take 1 mg by mouth 2 times a day.        DiphenhydrAMINE HCl   Take 1 Tab by mouth every day.        Haloperidol Decanoate (Solution) HALDOL DECANOATE 50 MG/ML by Intramuscular route every 30 days.        LevETIRAcetam (Tab) KEPPRA 500 MG Take 1 Tab by mouth 2 times a day.        MetFORMIN HCl (Tab) GLUCOPHAGE 500 MG Take 1 Tab by mouth every day.        OLANZapine (TABLET DISPERSIBLE) ZYPREXA 10 MG         Paliperidone Palmitate (Suspension) INVEGA SUSTENNA 234 MG/1.5ML         .                 Medicines prescribed today were sent to:     [facility] [address] [place] [address], [place] - [address] [place] [address]    Phone: [phone] Fax: [phone]    Open 24 Hours?: No      Medication refill instructions:       If your prescription bottle indicates you have medication refills left, it is not necessary to call your provider’s office. Please contact your pharmacy and they will refill your medication.    If your prescription bottle indicates you do not have any refills left, you may request refills at any time through one of the following ways: The online [employer] system (except Urgent Care), by calling your provider’s office, or by asking your pharmacy to contact your provider’s office with a refill request. Medication refills are processed only during regular business hours and may not be available until the next business day. Your provider may request additional information or to have a follow-up visit with you prior to refilling your medication.   *Please Note: Medication refills are assigned a new Rx number when refilled electronically. Your pharmacy may indicate that no refills were authorized even though a new prescription for the same medication is available at the pharmacy. Please request the medicine by name with the pharmacy before contacting your provider for a refill.        Referral     A referral request has been sent to our patient care  coordination department. Please allow 3-5 business days for us to process this request and contact you either by phone or mail. If you do not hear from us by the 5th business day, please call us at [phone].           Luis M Status: Patient Declined        Quit Tobacco Information     Do you want to quit using tobacco?    Quitting tobacco decreases risks of cancer, heart and lung disease, increases life expectancy, improves sense of taste and smell, and increases spending money, among other benefits.    If you are thinking about quitting, we can help.  • [facility] Quit Tobacco Program: 645.986.5750  o Program occurs weekly for four weeks and includes pharmacist consultation on products to support quitting smoking or chewing tobacco. A provider referral is needed for pharmacist consultation.  • Tobacco Users Help Hotline: 9-962-QUITNOW (199-0125) or https://[place].quitlogix.org/  o Free, confidential telephone and online coaching for [place] residents. Sessions are designed on a schedule that is convenient for you. Eligible clients receive free nicotine replacement therapy.  • Nationally: www.smokefree.gov  o Information and professional assistance to support both immediate and long-term needs as you become, and remain, a non-smoker. Smokefree.gov allows you to choose the help that best fits your needs.

## 2017-03-22 NOTE — PROGRESS NOTES
CC: Follow-up blood work due for wellness examination.    HPI:   Haile presents today with the following.    1. Medicare annual wellness visit, subsequent  Screenings performed below    2. IGT (impaired glucose tolerance)  Recent blood work with a history of diabetes shows an A1c of under 6. He is maintained on low-dose metformin and no longer truly diabetic he denies any symptoms of hypoglycemia.    3. Schizo affective schizophrenia (CMS-[facility])  Currently seeing psychiatry recent psychotic break after becoming homeless and therefore stopping his medications. He is now back on medications and much more stable and has a current living situation and is working with [facility] for future plans.    4. HTN (hypertension), benign  Blood pressure under good control denying any chest pain or shortness of breath no edema.    5. Hepatitis C virus infection without hepatic coma, unspecified chronicity  Status post treatment with normal LFTs.    6. Gastroesophageal reflux disease without esophagitis  Denying any major heartburn symptoms.    7. Schizophrenia, unspecified type (CMS-HCC)  As mentioned above.    8. Seizure disorder (CMS-HCC)  Recently started on Keppra for presumed seizures while he was in [facility] during a psychotic break. He does report periods of blacking out but again never been known to have seizures in the past. Do not believe there is been an EEG.          Depression Screening    Little interest or pleasure in doing things?  0 - not at all  Feeling down, depressed , or hopeless? 0 - not at all  Trouble falling or staying asleep, or sleeping too much?     Feeling tired or having little energy?     Poor appetite or overeating?     Feeling bad about yourself - or that you are a failure or have let yourself or your family down?    Trouble concentrating on things, such as reading the newspaper or watching television?    Moving or speaking so slowly that other people could have noticed.  Or the opposite - being so fidgety  or restless that you have been moving around a lot more than usual?     Thoughts that you would be better off dead, or of hurting yourself?     Patient Health Questionnaire Score:      If depressive symptoms identified deferred to follow up visit unless specifically addressed in assesment and plan.      Screening for Cognitive Impairment    Three Minute Recall (banana, sunrise, fence)  2/3    Draw clock face with all 12 numbers set to the hand to show 10 minures past 11 o'clock  1 3/5  Cognitive concerns identified defferred for follow up unless specifically addressed in assesment and plan.    Fall Risk Assessment    Has the patient had two or more falls in the last year or any fall with injury in the last year?  No    Safety Assessment    Throw rugs on floor.  No  Handrails on all stairs.  Yes  Good lighting in all hallways.  Yes  Difficulty hearing.  No  Patient counseled about all safety risks that were identified.    Functional Assessment ADLs    Are there any barriers preventing you from cooking for yourself or meeting nutritional needs?  No.    Are there any barriers preventing you from driving safely or obtaining transportation?  No.    Are there any barriers preventing you from using a telephone or calling for help?  No.    Are there any barriers preventing you from shopping?  No.    Are there any barriers preventing you from taking care of your own finances?  No.    Are there any barriers preventing you from managing your medications?  No.    Are currently engaing any exercise or physical activity?  Yes.       Health Maintenance Summary                IMM HEP B VACCINE Overdue 12/30/2012      Done 7/30/2012 Imm Admin: Hep A/HEP B Combined Vaccine (TwinRix)     Patient has more history with this topic...    Annual Wellness Visit Overdue 11/15/2015      Done 11/14/2014 Visit Dx: Encounter for initial preventive physical examination covered by Medicare    COLON CANCER SCREENING ANNUAL FIT Next Due 3/13/2018       Done 3/13/2017 OCCULT BLOOD FECES IMMUNOASSAY     Patient has more history with this topic...    IMM DTaP/Tdap/Td Vaccine Next Due 5/24/2022      Done 5/24/2012 Imm Admin: Tdap Vaccine          Patient Care Team:  Librado Colin M.D. as PCP - General (Internal Medicine)  Librado Morfin P.H.D. as Consulting Physician (Psychology)  Jack Temple PA-C as Consulting Physician (Gastroenterology)      Patient Active Problem List    Diagnosis Date Noted   • Schizo affective schizophrenia (CMS-HCC) 04/22/2010     Priority: High   • Hepatitis C 05/24/2010     Priority: Medium   • HTN (hypertension), benign 04/22/2010     Priority: Medium   • GERD (gastroesophageal reflux disease) 07/14/2013     Priority: Low   • IGT (impaired glucose tolerance) 03/21/2017   • Seizure disorder (CMS-HCC) 01/17/2017   • Schizophrenia (CMS-HCC) 10/14/2016   • Encounter for long-term (current) use of other medications 01/29/2015   • Tuberculosis 04/22/2010       Current Outpatient Prescriptions   Medication Sig Dispense Refill   • metformin (GLUCOPHAGE) 500 MG Tab Take 1 Tab by mouth every day. 30 Tab 11   • olanzapine zydis (ZYPREXA) 10 MG disintegrating tablet   2   • INVEGA SUSTENNA 234 MG/1.5ML Suspension   0   • levetiracetam (KEPPRA) 500 MG Tab Take 1 Tab by mouth 2 times a day. 60 Tab 1   • DIPHENHYDRAMINE HCL PO Take 1 Tab by mouth every day.     • haloperidol decanoate (HALDOL DECANOATE) 50 MG/ML Solution by Intramuscular route every 30 days.     • aspirin (ASA) 81 MG Chew Tab chewable tablet Take 81 mg by mouth every day.     • benztropine (COGENTIN) 1 MG Tab Take 1 mg by mouth 2 times a day.       No current facility-administered medications for this visit.         Allergies as of 03/22/2017 - Zack as Reviewed 03/22/2017   Allergen Reaction Noted   • Hctz  05/24/2012   • Other drug Nausea 04/22/2010   • Vicodin [hydrocodone-acetaminophen] Vomiting 11/12/2007   • Amoxicillin Nausea 04/22/2010        ROS: As per HPI.    /76 mmHg   Pulse 94  Temp(Src) 36.6 °C (97.8 °F)  Resp 16  Ht 1.829 m (6')  Wt 84.823 kg (187 lb)  BMI 25.36 kg/m2  SpO2 100%    Physical Exam:  Gen:         Alert and oriented, No apparent distress.  Neck:        No Lymphadenopathy or Bruits.  Lungs:     Clear to auscultation bilaterally  CV:          Regular rate and rhythm. No murmurs, rubs or gallops.  Abd:         Soft non tender, non distended. Normal active bowel sounds.  No  Hepatosplenomegaly, No pulsatile masses.                   Ext:          No clubbing, cyanosis, edema.      Assessment and Plan.   52 y.o. male with the following issues.    1. Medicare annual wellness visit, subsequent  Discussed healthy lifestyle habits as well as screening regimens.  - Annual Wellness Visit - Includes PPPS Subsequent ()    2. IGT (impaired glucose tolerance)  No longer diabetic continue metformin. Refilled medications today.    3. Schizo affective schizophrenia (CMS-[facility])  Significantly improved still titrating medications follow psychiatry.  - Annual Wellness Visit - Includes PPPS Subsequent ()    4. HTN (hypertension), benign  Currently well controlled, Discuss diet, exercise and salt restriction.  - Annual Wellness Visit - Includes PPPS Subsequent ()    5. Hepatitis C virus infection without hepatic coma, unspecified chronicity  Clinically stable no change in therapy  - Annual Wellness Visit - Includes PPPS Subsequent ()    6. Gastroesophageal reflux disease without esophagitis  Asymptomatic discussed diet  - Annual Wellness Visit - Includes PPPS Subsequent ()    7. Schizophrenia, unspecified type (CMS-HCC)  As mentioned before working with psychiatry.  - Annual Wellness Visit - Includes PPPS Subsequent ()    8. Seizure disorder (CMS-HCC)  Not clear if truly seizures have referred to neurology for evaluation and determinations about Keppra.  - Annual Wellness Visit - Includes PPPS Subsequent ()  - REFERRAL TO NEUROLOGY

## 2017-07-21 ENCOUNTER — HOSPITAL ENCOUNTER (EMERGENCY)
Dept: HOSPITAL 8 - ED | Age: 52
LOS: 1 days | Discharge: HOME | End: 2017-07-22
Payer: MEDICARE

## 2017-07-21 VITALS — WEIGHT: 231.49 LBS | HEIGHT: 73 IN | BODY MASS INDEX: 30.68 KG/M2

## 2017-07-21 DIAGNOSIS — R07.89: Primary | ICD-10-CM

## 2017-07-21 DIAGNOSIS — F25.9: ICD-10-CM

## 2017-07-21 DIAGNOSIS — F31.9: ICD-10-CM

## 2017-07-21 DIAGNOSIS — F43.10: ICD-10-CM

## 2017-07-21 LAB
AST SERPL-CCNC: 44 U/L (ref 15–37)
BUN SERPL-MCNC: 13 MG/DL (ref 7–18)

## 2017-07-21 PROCEDURE — 99285 EMERGENCY DEPT VISIT HI MDM: CPT

## 2017-07-21 PROCEDURE — 71010: CPT

## 2017-07-21 PROCEDURE — 84484 ASSAY OF TROPONIN QUANT: CPT

## 2017-07-21 PROCEDURE — 83690 ASSAY OF LIPASE: CPT

## 2017-07-21 PROCEDURE — 36415 COLL VENOUS BLD VENIPUNCTURE: CPT

## 2017-07-21 PROCEDURE — 93005 ELECTROCARDIOGRAM TRACING: CPT

## 2017-07-21 PROCEDURE — 80053 COMPREHEN METABOLIC PANEL: CPT

## 2017-07-21 PROCEDURE — 85025 COMPLETE CBC W/AUTO DIFF WBC: CPT

## 2017-07-22 VITALS — DIASTOLIC BLOOD PRESSURE: 70 MMHG | SYSTOLIC BLOOD PRESSURE: 122 MMHG

## 2017-07-22 LAB — IS PT STATUS REG ER OR PRE ER?: YES

## 2017-07-23 ENCOUNTER — APPOINTMENT (OUTPATIENT)
Dept: RADIOLOGY | Facility: MEDICAL CENTER | Age: 52
End: 2017-07-23
Payer: MEDICARE

## 2017-07-23 ENCOUNTER — HOSPITAL ENCOUNTER (EMERGENCY)
Facility: MEDICAL CENTER | Age: 52
End: 2017-07-23
Payer: MEDICARE

## 2017-07-23 VITALS
BODY MASS INDEX: 26 KG/M2 | TEMPERATURE: 98.1 F | SYSTOLIC BLOOD PRESSURE: 123 MMHG | OXYGEN SATURATION: 97 % | WEIGHT: 196.21 LBS | RESPIRATION RATE: 18 BRPM | HEART RATE: 89 BPM | HEIGHT: 73 IN | DIASTOLIC BLOOD PRESSURE: 81 MMHG

## 2017-07-23 LAB
ALBUMIN SERPL BCP-MCNC: 3.6 G/DL (ref 3.2–4.9)
ALBUMIN/GLOB SERPL: 1.1 G/DL
ALP SERPL-CCNC: 67 U/L (ref 30–99)
ALT SERPL-CCNC: 15 U/L (ref 2–50)
ANION GAP SERPL CALC-SCNC: 7 MMOL/L (ref 0–11.9)
APTT PPP: 30.6 SEC (ref 24.7–36)
AST SERPL-CCNC: 31 U/L (ref 12–45)
BASOPHILS # BLD AUTO: 0.9 % (ref 0–1.8)
BASOPHILS # BLD: 0.05 K/UL (ref 0–0.12)
BILIRUB SERPL-MCNC: 0.4 MG/DL (ref 0.1–1.5)
BNP SERPL-MCNC: 5 PG/ML (ref 0–100)
BUN SERPL-MCNC: 11 MG/DL (ref 8–22)
CALCIUM SERPL-MCNC: 9 MG/DL (ref 8.5–10.5)
CHLORIDE SERPL-SCNC: 103 MMOL/L (ref 96–112)
CO2 SERPL-SCNC: 20 MMOL/L (ref 20–33)
CREAT SERPL-MCNC: 0.72 MG/DL (ref 0.5–1.4)
EOSINOPHIL # BLD AUTO: 0.42 K/UL (ref 0–0.51)
EOSINOPHIL NFR BLD: 7.3 % (ref 0–6.9)
ERYTHROCYTE [DISTWIDTH] IN BLOOD BY AUTOMATED COUNT: 47.4 FL (ref 35.9–50)
GFR SERPL CREATININE-BSD FRML MDRD: >60 ML/MIN/1.73 M 2
GLOBULIN SER CALC-MCNC: 3.2 G/DL (ref 1.9–3.5)
GLUCOSE SERPL-MCNC: 94 MG/DL (ref 65–99)
HCT VFR BLD AUTO: 38.3 % (ref 42–52)
HGB BLD-MCNC: 13.1 G/DL (ref 14–18)
IMM GRANULOCYTES # BLD AUTO: 0.03 K/UL (ref 0–0.11)
IMM GRANULOCYTES NFR BLD AUTO: 0.5 % (ref 0–0.9)
INR PPP: 1.14 (ref 0.87–1.13)
LIPASE SERPL-CCNC: 44 U/L (ref 11–82)
LYMPHOCYTES # BLD AUTO: 1.8 K/UL (ref 1–4.8)
LYMPHOCYTES NFR BLD: 31.1 % (ref 22–41)
MCH RBC QN AUTO: 30.3 PG (ref 27–33)
MCHC RBC AUTO-ENTMCNC: 34.2 G/DL (ref 33.7–35.3)
MCV RBC AUTO: 88.7 FL (ref 81.4–97.8)
MONOCYTES # BLD AUTO: 0.67 K/UL (ref 0–0.85)
MONOCYTES NFR BLD AUTO: 11.6 % (ref 0–13.4)
NEUTROPHILS # BLD AUTO: 2.82 K/UL (ref 1.82–7.42)
NEUTROPHILS NFR BLD: 48.6 % (ref 44–72)
NRBC # BLD AUTO: 0 K/UL
NRBC BLD AUTO-RTO: 0 /100 WBC
PLATELET # BLD AUTO: 200 K/UL (ref 164–446)
PMV BLD AUTO: 9.8 FL (ref 9–12.9)
POTASSIUM SERPL-SCNC: 3.7 MMOL/L (ref 3.6–5.5)
PROT SERPL-MCNC: 6.8 G/DL (ref 6–8.2)
PROTHROMBIN TIME: 15 SEC (ref 12–14.6)
RBC # BLD AUTO: 4.32 M/UL (ref 4.7–6.1)
SODIUM SERPL-SCNC: 130 MMOL/L (ref 135–145)
TROPONIN I SERPL-MCNC: <0.01 NG/ML (ref 0–0.04)
WBC # BLD AUTO: 5.8 K/UL (ref 4.8–10.8)

## 2017-07-23 PROCEDURE — 84484 ASSAY OF TROPONIN QUANT: CPT

## 2017-07-23 PROCEDURE — 85610 PROTHROMBIN TIME: CPT

## 2017-07-23 PROCEDURE — 80053 COMPREHEN METABOLIC PANEL: CPT

## 2017-07-23 PROCEDURE — 93005 ELECTROCARDIOGRAM TRACING: CPT

## 2017-07-23 PROCEDURE — 83880 ASSAY OF NATRIURETIC PEPTIDE: CPT

## 2017-07-23 PROCEDURE — 83690 ASSAY OF LIPASE: CPT

## 2017-07-23 PROCEDURE — 85025 COMPLETE CBC W/AUTO DIFF WBC: CPT

## 2017-07-23 PROCEDURE — 85730 THROMBOPLASTIN TIME PARTIAL: CPT

## 2017-07-23 PROCEDURE — 302449 STATCHG TRIAGE ONLY (STATISTIC)

## 2017-07-23 ASSESSMENT — PAIN SCALES - GENERAL: PAINLEVEL_OUTOF10: 0

## 2017-07-23 NOTE — ED NOTES
Chief Complaint   Patient presents with   • Chest Pain     since this am at 1040. Similar episode yesterday and was seen at [facility] for above. Given ASA 324mg and NTG. Blood drawn and sent to lab and PIV removed. Called for EKG.

## 2017-07-24 ENCOUNTER — TELEPHONE (OUTPATIENT)
Dept: MEDICAL GROUP | Facility: MEDICAL CENTER | Age: 52
End: 2017-07-24

## 2017-07-24 NOTE — TELEPHONE ENCOUNTER
1. Caller Name: Haile                                         Call Back Number: [phone]      Patient approves a detailed voicemail message: N\A    2. What are the patient's symptoms (location & severity)? Chest pressure, SOB    3. Is this a new symptom Yes    4. When did it start? Saturday    5. Action taken per Active Symptom Guide: Emergency Department recommended    6. Patient agrees to recommended action per active symptom guide     Patient was seen at [facility] over the weekend and [facility]. Left after being triaged. EKG was done. LVM this morning that he is still having chest pressure and SOB. HAs an appt. On Thursday. Spoke with tech at group home and advised him to take patient back to ER to be evaluated.

## 2017-07-24 NOTE — TELEPHONE ENCOUNTER
Future Appointments       Provider Department Center    7/25/2017 11:20 AM Librado Colin M.D. [facility] [address]    10/26/2017 9:40 AM PIEDAD Darby [facility] Neurology     10/30/2017 11:20 AM Librado Colin M.D. [facility] [address]        ESTABLISHED PATIENT PRE-VISIT PLANNING     Note: Patient will not be contacted if there is no indication to call.     1.  Reviewed note from last office visit with PCP and/or other med group provider: Yes    2.  If any orders were placed at last visit, do we have Results/Consult Notes?        •  Labs - Labs were not ordered at last office visit.       •  Imaging - Imaging was not ordered at last office visit.       •  Referrals - No referrals were ordered at last office visit.    3.  Immunizations were updated in [facility] using WebIZ?: Yes       •  Web [employer] Recommendations: MMR , TDAP and VARICELLA (Chicken Pox)     4.  Patient is due for the following Health Maintenance Topics:   Health Maintenance Due   Topic Date Due   • IMM HEP B VACCINE (3 of 3 - Twinrix 3-Dose Series) 12/30/2012           5.  Patient was not informed to arrive 15 min prior to their scheduled appointment and bring in their medication bottles.

## 2017-07-25 ENCOUNTER — OFFICE VISIT (OUTPATIENT)
Dept: MEDICAL GROUP | Facility: MEDICAL CENTER | Age: 52
End: 2017-07-25
Payer: MEDICARE

## 2017-07-25 ENCOUNTER — HOSPITAL ENCOUNTER (OUTPATIENT)
Dept: LAB | Facility: MEDICAL CENTER | Age: 52
End: 2017-07-25
Attending: INTERNAL MEDICINE
Payer: MEDICARE

## 2017-07-25 VITALS
TEMPERATURE: 98.6 F | DIASTOLIC BLOOD PRESSURE: 76 MMHG | SYSTOLIC BLOOD PRESSURE: 120 MMHG | HEIGHT: 72 IN | WEIGHT: 193.6 LBS | RESPIRATION RATE: 16 BRPM | HEART RATE: 80 BPM | OXYGEN SATURATION: 96 % | BODY MASS INDEX: 26.22 KG/M2

## 2017-07-25 DIAGNOSIS — E87.1 HYPONATREMIA: ICD-10-CM

## 2017-07-25 DIAGNOSIS — R07.9 CHEST PAIN, UNSPECIFIED TYPE: ICD-10-CM

## 2017-07-25 DIAGNOSIS — R30.0 DYSURIA: ICD-10-CM

## 2017-07-25 LAB
ALBUMIN SERPL BCP-MCNC: 3.9 G/DL (ref 3.2–4.9)
ALBUMIN/GLOB SERPL: 1.1 G/DL
ALP SERPL-CCNC: 74 U/L (ref 30–99)
ALT SERPL-CCNC: 17 U/L (ref 2–50)
ANION GAP SERPL CALC-SCNC: 6 MMOL/L (ref 0–11.9)
APPEARANCE UR: CLEAR
AST SERPL-CCNC: 29 U/L (ref 12–45)
BILIRUB SERPL-MCNC: 0.4 MG/DL (ref 0.1–1.5)
BILIRUB UR QL STRIP.AUTO: NEGATIVE
BUN SERPL-MCNC: 8 MG/DL (ref 8–22)
CALCIUM SERPL-MCNC: 8.9 MG/DL (ref 8.5–10.5)
CHLORIDE SERPL-SCNC: 103 MMOL/L (ref 96–112)
CO2 SERPL-SCNC: 25 MMOL/L (ref 20–33)
COLOR UR: YELLOW
CREAT SERPL-MCNC: 0.9 MG/DL (ref 0.5–1.4)
CULTURE IF INDICATED INDCX: NO UA CULTURE
GFR SERPL CREATININE-BSD FRML MDRD: >60 ML/MIN/1.73 M 2
GLOBULIN SER CALC-MCNC: 3.6 G/DL (ref 1.9–3.5)
GLUCOSE SERPL-MCNC: 84 MG/DL (ref 65–99)
GLUCOSE UR STRIP.AUTO-MCNC: NEGATIVE MG/DL
KETONES UR STRIP.AUTO-MCNC: NEGATIVE MG/DL
LEUKOCYTE ESTERASE UR QL STRIP.AUTO: NEGATIVE
MICRO URNS: NORMAL
NITRITE UR QL STRIP.AUTO: NEGATIVE
PH UR STRIP.AUTO: 7.5 [PH]
POTASSIUM SERPL-SCNC: 3.9 MMOL/L (ref 3.6–5.5)
PROT SERPL-MCNC: 7.5 G/DL (ref 6–8.2)
PROT UR QL STRIP: NEGATIVE MG/DL
RBC UR QL AUTO: NEGATIVE
SODIUM SERPL-SCNC: 134 MMOL/L (ref 135–145)
SP GR UR STRIP.AUTO: 1.01
UROBILINOGEN UR STRIP.AUTO-MCNC: 0.2 MG/DL

## 2017-07-25 PROCEDURE — 99214 OFFICE O/P EST MOD 30 MIN: CPT | Performed by: INTERNAL MEDICINE

## 2017-07-25 PROCEDURE — 80053 COMPREHEN METABOLIC PANEL: CPT

## 2017-07-25 PROCEDURE — 36415 COLL VENOUS BLD VENIPUNCTURE: CPT

## 2017-07-25 PROCEDURE — 81003 URINALYSIS AUTO W/O SCOPE: CPT

## 2017-07-25 NOTE — MR AVS SNAPSHOT
"Haile Chavez   2017 11:20 AM   Office Visit   MRN: [id number]    Department:  [address] [place] [address]   Dept Phone:  [phone]    Description:  Male : 1965   Provider:  Librado Colin M.D.           Reason for Visit     Chest Pressure hospital follow up on pressure and sob      Allergies as of 2017     Allergen Noted Reactions    Hctz 2012       Hyponatremia      Other Drug 2010   Nausea    All anti-inflammitories,  Headache and body aches.    Vicodin [Hydrocodone-Acetaminophen] 2007   Vomiting    Headaches    Amoxicillin 2010   Nausea    Headache and body aches      You were diagnosed with     Chest pain, unspecified type   [4760737]       Dysuria   [788.1.ICD-9-CM]       Hyponatremia   [580403]         Vital Signs     Blood Pressure Pulse Temperature Respirations Height Weight    120/76 mmHg 80 37 °C (98.6 °F) 16 1.829 m (6' 0.01\") 87.816 kg (193 lb 9.6 oz)    Body Mass Index Oxygen Saturation Smoking Status             26.25 kg/m2 96% Current Every Day Smoker         Basic Information     Date Of Birth Sex Race Ethnicity Preferred Language    1965 Male Black or  Non- English      Your appointments     Oct 26, 2017  9:40 AM   New Patient with PIEDAD Darby   [facility] (--)    [address] [place] [address], [address]  [facility] [id number] [phone]           Please bring Photo ID, Insurance Cards, All Medication Bottles and copies of any legal documents (such as Living Will, Power of ) If speaking a language besides English please bring an adult . Please arrive 30 minutes prior for check in and registration. You will be receiving a confirmation call a few days before your appointment from our automated call confirmation system.            Oct 30, 2017 11:20 AM   Established Patient with Librado Colin M.D.   [address] ([address])    [address] [place] [address]  [facility] " [id number]   [phone]           You will be receiving a confirmation call a few days before your appointment from our automated call confirmation system.              Problem List              ICD-10-CM Priority Class Noted - Resolved    Schizo affective schizophrenia (CMS-HCC) F25.0 High  4/22/2010 - Present    HTN (hypertension), benign I10 Medium  4/22/2010 - Present    Tuberculosis A15.9   4/22/2010 - Present    Hepatitis C B19.20 Medium  5/24/2010 - Present    GERD (gastroesophageal reflux disease) K21.9 Low  7/14/2013 - Present    Encounter for long-term (current) use of other medications Z79.899   1/29/2015 - Present    Schizophrenia (CMS-HCC) F20.9   10/14/2016 - Present    Seizure disorder (CMS-[facility]) G40.909   1/17/2017 - Present    IGT (impaired glucose tolerance) R73.02   3/21/2017 - Present      Health Maintenance        Date Due Completion Dates    IMM HEP B VACCINE (3 of 3 - Twinrix 3-Dose Series) 12/30/2012 7/30/2012, 2/27/2012, 1/23/2012    IMM INFLUENZA (1) 9/1/2017 10/15/2016, 11/14/2014, 10/21/2013, 12/28/2012, 11/14/2011, 11/8/2010    COLON CANCER SCREENING ANNUAL FIT 3/13/2018 3/13/2017, 5/21/2014, 5/1/2013    IMM DTaP/Tdap/Td Vaccine (2 - Td) 5/24/2022 5/24/2012            Current Immunizations     Hep A/HEP B Combined Vaccine (TwinRix) 7/30/2012  9:00 AM, 2/27/2012    Hepatitis B Vaccine Non-Recombivax (Ped/Adol) 1/23/2012  3:22 PM    Influenza TIV (IM) 10/21/2013  2:03 PM, 12/28/2012, 11/14/2011  5:00 PM, 11/8/2010  1:31 PM    Influenza Vaccine Quad Inj (Pf) 10/15/2016  1:52 PM    Influenza Vaccine Quad Inj (Preserved) 11/14/2014    OPV - Historical Data 1/16/1978    Pneumococcal polysaccharide vaccine (PPSV-23) 9/3/2010  9:10 PM    Tdap Vaccine 5/24/2012  8:45 AM      Below and/or attached are the medications your provider expects you to take. Review all of your home medications and newly ordered medications with your provider and/or pharmacist. Follow medication instructions as directed  by your provider and/or pharmacist. Please keep your medication list with you and share with your provider. Update the information when medications are discontinued, doses are changed, or new medications (including over-the-counter products) are added; and carry medication information at all times in the event of emergency situations     Allergies:  HCTZ - (reactions not documented)     OTHER DRUG - Nausea     VICODIN - Vomiting     AMOXICILLIN - Nausea               Medications  Valid as of: July 25, 2017 - 11:52 AM    Generic Name Brand Name Tablet Size Instructions for use    Aspirin (Chew Tab) ASA 81 MG Take 81 mg by mouth every day.        Benztropine Mesylate (Tab) COGENTIN 1 MG Take 1 mg by mouth 2 times a day.        DiphenhydrAMINE HCl   Take 1 Tab by mouth every day.        LevETIRAcetam (Tab) KEPPRA 500 MG Take 1 Tab by mouth 2 times a day.        MetFORMIN HCl (Tab) GLUCOPHAGE 500 MG Take 1 Tab by mouth every day.        OLANZapine (TABLET DISPERSIBLE) ZYPREXA 10 MG         Paliperidone Palmitate (Suspension) INVEGA SUSTENNA 234 MG/1.5ML         .                 Medicines prescribed today were sent to:     [facility] [address] [place] [address] [address]     [address] [place] [address]    Phone: [phone] Fax: [phone]    Open 24 Hours?: No      Medication refill instructions:       If your prescription bottle indicates you have medication refills left, it is not necessary to call your provider’s office. Please contact your pharmacy and they will refill your medication.    If your prescription bottle indicates you do not have any refills left, you may request refills at any time through one of the following ways: The online EatingWell system (except Urgent Care), by calling your provider’s office, or by asking your pharmacy to contact your provider’s office with a refill request. Medication refills are processed only during regular business hours and may not be available until the next business day.  Your provider may request additional information or to have a follow-up visit with you prior to refilling your medication.   *Please Note: Medication refills are assigned a new Rx number when refilled electronically. Your pharmacy may indicate that no refills were authorized even though a new prescription for the same medication is available at the pharmacy. Please request the medicine by name with the pharmacy before contacting your provider for a refill.        Your To Do List     Future Labs/Procedures Complete By Expires    COMP METABOLIC PANEL  As directed 7/26/2018    URINALYSIS,CULTURE IF INDICATED  As directed 7/26/2018         Igenica Access Code: [email or web address]  Expires: 8/24/2017 11:46 AM    Igenica  A secure, online tool to manage your health information     [employer]’s Igenica® is a secure, online tool that connects you to your personalized health information from the privacy of your home -- day or night - making it very easy for you to manage your healthcare. Once the activation process is completed, you can even access your medical information using the Igenica selin, which is available for free in the Apple Selin store or Google Play store.     Igenica provides the following levels of access (as shown below):   My Chart Features   [facility] Doctor [facility]  Specialists [facility]  Urgent  Care Non-[facility]  Primary Care  Doctor   Email your healthcare team securely and privately 24/7 X X X    Manage appointments: schedule your next appointment; view details of past/upcoming appointments X      Request prescription refills. X      View recent personal medical records, including lab and immunizations X X X X   View health record, including health history, allergies, medications X X X X   Read reports about your outpatient visits, procedures, consult and ER notes X X X X   See your discharge summary, which is a recap of your hospital and/or ER visit that includes your diagnosis, lab results, and  care plan. X X       How to register for Checkout10:  1. Go to  [email or web address].  2. Click on the Sign Up Now box, which takes you to the New Member Sign Up page. You will need to provide the following information:  a. Enter your Checkout10 Access Code exactly as it appears at the top of this page. (You will not need to use this code after you’ve completed the sign-up process. If you do not sign up before the expiration date, you must request a new code.)   b. Enter your date of birth.   c. Enter your home email address.   d. Click Submit, and follow the next screen’s instructions.  3. Create a Checkout10 ID. This will be your Checkout10 login ID and cannot be changed, so think of one that is secure and easy to remember.  4. Create a Enabled Employmentt password. You can change your password at any time.  5. Enter your Password Reset Question and Answer. This can be used at a later time if you forget your password.   6. Enter your e-mail address. This allows you to receive e-mail notifications when new information is available in Checkout10.  7. Click Sign Up. You can now view your health information.    For assistance activating your Checkout10 account, call [phone]        Quit Tobacco Information     Do you want to quit using tobacco?    Quitting tobacco decreases risks of cancer, heart and lung disease, increases life expectancy, improves sense of taste and smell, and increases spending money, among other benefits.    If you are thinking about quitting, we can help.  • [facility] Quit Tobacco Program: [phone]  o Program occurs weekly for four weeks and includes pharmacist consultation on products to support quitting smoking or chewing tobacco. A provider referral is needed for pharmacist consultation.  • Tobacco Users Help Hotline: 8-069-QUIT-NOW (770-0620) or https://[place].quitlogix.org/  o Free, confidential telephone and online coaching for [place] residents. Sessions are designed on a schedule that is convenient for  you. Eligible clients receive free nicotine replacement therapy.  • Nationally: www.smokefree.gov  o Information and professional assistance to support both immediate and long-term needs as you become, and remain, a non-smoker. Smokefree.gov allows you to choose the help that best fits your needs.

## 2017-07-25 NOTE — PROGRESS NOTES
CC: Follow-up multiple issues    HPI:   Haile presents today with the following.    1. Chest pain, unspecified type  Presents after being hospitalized at [facility] with an episode of chest pain. He reports he is walking in the hot sun. He was kept there overnight and released. He denies any further chest pains. He did have similar events year ago with a negative stress past. He did initially reports [facility] and the troponin was negative there. Denies any further chest pain symptoms.    2. Dysuria  He is having intermittent difficulties with urination in terms of some mild pain and increased frequency. Blood sugars been normal including at the ER. He denies any current pain.    3. Hyponatremia  Sodium slightly low at 130 reports his mental status is doing fairly well maintained on his current medications by psychiatry.      Patient Active Problem List    Diagnosis Date Noted   • Schizo affective schizophrenia (CMS-HCC) 04/22/2010     Priority: High   • Hepatitis C 05/24/2010     Priority: Medium   • HTN (hypertension), benign 04/22/2010     Priority: Medium   • GERD (gastroesophageal reflux disease) 07/14/2013     Priority: Low   • IGT (impaired glucose tolerance) 03/21/2017   • Seizure disorder (CMS-HCC) 01/17/2017   • Schizophrenia (CMS-HCC) 10/14/2016   • Encounter for long-term (current) use of other medications 01/29/2015   • Tuberculosis 04/22/2010       Current Outpatient Prescriptions   Medication Sig Dispense Refill   • olanzapine zydis (ZYPREXA) 10 MG disintegrating tablet   2   • INVEGA SUSTENNA 234 MG/1.5ML Suspension   0   • metformin (GLUCOPHAGE) 500 MG Tab Take 1 Tab by mouth every day. 30 Tab 11   • levetiracetam (KEPPRA) 500 MG Tab Take 1 Tab by mouth 2 times a day. 60 Tab 1   • DIPHENHYDRAMINE HCL PO Take 1 Tab by mouth every day.     • aspirin (ASA) 81 MG Chew Tab chewable tablet Take 81 mg by mouth every day.     • benztropine (COGENTIN) 1 MG Tab Take 1 mg by mouth 2 times a day.       No  "current facility-administered medications for this visit.         Allergies as of 07/25/2017 - Zack as Reviewed 07/25/2017   Allergen Reaction Noted   • Hctz  05/24/2012   • Other drug Nausea 04/22/2010   • Vicodin [hydrocodone-acetaminophen] Vomiting 11/12/2007   • Amoxicillin Nausea 04/22/2010        ROS: As per HPI.    /76 mmHg  Pulse 80  Temp(Src) 37 °C (98.6 °F)  Resp 16  Ht 1.829 m (6' 0.01\")  Wt 87.816 kg (193 lb 9.6 oz)  BMI 26.25 kg/m2  SpO2 96%    Physical Exam:  Gen:         Alert and oriented, No apparent distress.  Neck:        No Lymphadenopathy or Bruits.  Lungs:     Clear to auscultation bilaterally  CV:          Regular rate and rhythm. No murmurs, rubs or gallops.               Ext:          No clubbing, cyanosis, edema.      Assessment and Plan.   52 y.o. male with the following issues.    1. Chest pain, unspecified type  No return of symptoms we'll get records from [facility] no further workup pending any abnormal results seen at [facility].    2. Dysuria  Checking urinalysis  - URINALYSIS,CULTURE IF INDICATED; Future    3. Hyponatremia  Rechecking blood work.  - COMP METABOLIC PANEL; Future        "

## 2017-09-06 ENCOUNTER — HOSPITAL ENCOUNTER (EMERGENCY)
Facility: MEDICAL CENTER | Age: 52
End: 2017-09-06
Payer: MEDICARE

## 2017-09-06 VITALS
OXYGEN SATURATION: 96 % | TEMPERATURE: 99.5 F | DIASTOLIC BLOOD PRESSURE: 83 MMHG | HEART RATE: 96 BPM | HEIGHT: 73 IN | WEIGHT: 197.75 LBS | RESPIRATION RATE: 16 BRPM | BODY MASS INDEX: 26.21 KG/M2 | SYSTOLIC BLOOD PRESSURE: 144 MMHG

## 2017-09-06 PROCEDURE — 302449 STATCHG TRIAGE ONLY (STATISTIC)

## 2017-09-06 PROCEDURE — 93005 ELECTROCARDIOGRAM TRACING: CPT
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
## 2017-09-07 ENCOUNTER — HOSPITAL ENCOUNTER (EMERGENCY)
Facility: MEDICAL CENTER | Age: 52
End: 2017-09-07
Attending: EMERGENCY MEDICINE
Payer: MEDICARE

## 2017-09-07 ENCOUNTER — APPOINTMENT (OUTPATIENT)
Dept: RADIOLOGY | Facility: MEDICAL CENTER | Age: 52
End: 2017-09-07
Attending: EMERGENCY MEDICINE
Payer: MEDICARE

## 2017-09-07 VITALS
RESPIRATION RATE: 14 BRPM | HEART RATE: 74 BPM | SYSTOLIC BLOOD PRESSURE: 133 MMHG | TEMPERATURE: 99.4 F | OXYGEN SATURATION: 97 % | HEIGHT: 73 IN | WEIGHT: 194 LBS | DIASTOLIC BLOOD PRESSURE: 84 MMHG | BODY MASS INDEX: 25.71 KG/M2

## 2017-09-07 DIAGNOSIS — S00.83XA FACIAL CONTUSION, INITIAL ENCOUNTER: ICD-10-CM

## 2017-09-07 DIAGNOSIS — S09.90XA CLOSED HEAD INJURY, INITIAL ENCOUNTER: ICD-10-CM

## 2017-09-07 DIAGNOSIS — H10.9 CONJUNCTIVITIS OF LEFT EYE, UNSPECIFIED CONJUNCTIVITIS TYPE: ICD-10-CM

## 2017-09-07 LAB
EKG IMPRESSION: NORMAL
EKG IMPRESSION: NORMAL

## 2017-09-07 PROCEDURE — 70486 CT MAXILLOFACIAL W/O DYE: CPT

## 2017-09-07 PROCEDURE — 99283 EMERGENCY DEPT VISIT LOW MDM: CPT

## 2017-09-07 RX ORDER — POLYMYXIN B SULFATE AND TRIMETHOPRIM 1; 10000 MG/ML; [USP'U]/ML
2 SOLUTION OPHTHALMIC EVERY 4 HOURS
Qty: 1 BOTTLE | Refills: 0 | Status: SHIPPED | OUTPATIENT
Start: 2017-09-07 | End: 2018-06-12

## 2017-09-07 ASSESSMENT — PAIN SCALES - GENERAL: PAINLEVEL_OUTOF10: 1

## 2017-09-07 NOTE — DISCHARGE INSTRUCTIONS
Return if fever, vomiting or if no better in 12 hours.Concussion, Adult  A concussion, or closed-head injury, is a brain injury caused by a direct blow to the head or by a quick and sudden movement (jolt) of the head or neck. Concussions are usually not life-threatening. Even so, the effects of a concussion can be serious. If you have had a concussion before, you are more likely to experience concussion-like symptoms after a direct blow to the head.   CAUSES  · Direct blow to the head, such as from running into another player during a soccer game, being hit in a fight, or hitting your head on a hard surface.  · A jolt of the head or neck that causes the brain to move back and forth inside the skull, such as in a car crash.  SIGNS AND SYMPTOMS  The signs of a concussion can be hard to notice. Early on, they may be missed by you, family members, and health care providers. You may look fine but act or feel differently.  Symptoms are usually temporary, but they may last for days, weeks, or even longer. Some symptoms may appear right away while others may not show up for hours or days. Every head injury is different. Symptoms include:  · Mild to moderate headaches that will not go away.  · A feeling of pressure inside your head.  · Having more trouble than usual:  ¨ Learning or remembering things you have heard.  ¨ Answering questions.  ¨ Paying attention or concentrating.  ¨ Organizing daily tasks.  ¨ Making decisions and solving problems.  · Slowness in thinking, acting or reacting, speaking, or reading.  · Getting lost or being easily confused.  · Feeling tired all the time or lacking energy (fatigued).  · Feeling drowsy.  · Sleep disturbances.  ¨ Sleeping more than usual.  ¨ Sleeping less than usual.  ¨ Trouble falling asleep.  ¨ Trouble sleeping (insomnia).  · Loss of balance or feeling lightheaded or dizzy.  · Nausea or vomiting.  · Numbness or tingling.  · Increased sensitivity  to:  ¨ Sounds.  ¨ Lights.  ¨ Distractions.  · Vision problems or eyes that tire easily.  · Diminished sense of taste or smell.  · Ringing in the ears.  · Mood changes such as feeling sad or anxious.  · Becoming easily irritated or angry for little or no reason.  · Lack of motivation.  · Seeing or hearing things other people do not see or hear (hallucinations).  DIAGNOSIS  Your health care provider can usually diagnose a concussion based on a description of your injury and symptoms. He or she will ask whether you passed out (lost consciousness) and whether you are having trouble remembering events that happened right before and during your injury.  Your evaluation might include:  · A brain scan to look for signs of injury to the brain. Even if the test shows no injury, you may still have a concussion.  · Blood tests to be sure other problems are not present.  TREATMENT  · Concussions are usually treated in an emergency department, in urgent care, or at a clinic. You may need to stay in the hospital overnight for further treatment.  · Tell your health care provider if you are taking any medicines, including prescription medicines, over-the-counter medicines, and natural remedies. Some medicines, such as blood thinners (anticoagulants) and aspirin, may increase the chance of complications. Also tell your health care provider whether you have had alcohol or are taking illegal drugs. This information may affect treatment.  · Your health care provider will send you home with important instructions to follow.  · How fast you will recover from a concussion depends on many factors. These factors include how severe your concussion is, what part of your brain was injured, your age, and how healthy you were before the concussion.  · Most people with mild injuries recover fully. Recovery can take time. In general, recovery is slower in older persons. Also, persons who have had a concussion in the past or have other medical  problems may find that it takes longer to recover from their current injury.  HOME CARE INSTRUCTIONS  General Instructions  · Carefully follow the directions your health care provider gave you.  · Only take over-the-counter or prescription medicines for pain, discomfort, or fever as directed by your health care provider.  · Take only those medicines that your health care provider has approved.  · Do not drink alcohol until your health care provider says you are well enough to do so. Alcohol and certain other drugs may slow your recovery and can put you at risk of further injury.  · If it is harder than usual to remember things, write them down.  · If you are easily distracted, try to do one thing at a time. For example, do not try to watch TV while fixing dinner.  · Talk with family members or close friends when making important decisions.  · Keep all follow-up appointments. Repeated evaluation of your symptoms is recommended for your recovery.  · Watch your symptoms and tell others to do the same. Complications sometimes occur after a concussion. Older adults with a brain injury may have a higher risk of serious complications, such as a blood clot on the brain.  · Tell your teachers, school nurse, school counselor, , , or  about your injury, symptoms, and restrictions. Tell them about what you can or cannot do. They should watch for:  ¨ Increased problems with attention or concentration.  ¨ Increased difficulty remembering or learning new information.  ¨ Increased time needed to complete tasks or assignments.  ¨ Increased irritability or decreased ability to cope with stress.  ¨ Increased symptoms.  · Rest. Rest helps the brain to heal. Make sure you:  ¨ Get plenty of sleep at night. Avoid staying up late at night.  ¨ Keep the same bedtime hours on weekends and weekdays.  ¨ Rest during the day. Take daytime naps or rest breaks when you feel tired.  · Limit activities that require a  lot of thought or concentration. These include:  ¨ Doing homework or job-related work.  ¨ Watching TV.  ¨ Working on the computer.  · Avoid any situation where there is potential for another head injury (football, hockey, soccer, basketball, martial arts, downhill snow sports and horseback riding). Your condition will get worse every time you experience a concussion. You should avoid these activities until you are evaluated by the appropriate follow-up health care providers.  Returning To Your Regular Activities  You will need to return to your normal activities slowly, not all at once. You must give your body and brain enough time for recovery.  · Do not return to sports or other athletic activities until your health care provider tells you it is safe to do so.  · Ask your health care provider when you can drive, ride a bicycle, or operate heavy machinery. Your ability to react may be slower after a brain injury. Never do these activities if you are dizzy.  · Ask your health care provider about when you can return to work or school.  Preventing Another Concussion  It is very important to avoid another brain injury, especially before you have recovered. In rare cases, another injury can lead to permanent brain damage, brain swelling, or death. The risk of this is greatest during the first 7-10 days after a head injury. Avoid injuries by:  · Wearing a seat belt when riding in a car.  · Drinking alcohol only in moderation.  · Wearing a helmet when biking, skiing, skateboarding, skating, or doing similar activities.  · Avoiding activities that could lead to a second concussion, such as contact or recreational sports, until your health care provider says it is okay.  · Taking safety measures in your home.  ¨ Remove clutter and tripping hazards from floors and stairways.  ¨ Use grab bars in bathrooms and handrails by stairs.  ¨ Place non-slip mats on floors and in bathtubs.  ¨ Improve lighting in dim areas.  SEEK MEDICAL  CARE IF:  · You have increased problems paying attention or concentrating.  · You have increased difficulty remembering or learning new information.  · You need more time to complete tasks or assignments than before.  · You have increased irritability or decreased ability to cope with stress.  · You have more symptoms than before.  Seek medical care if you have any of the following symptoms for more than 2 weeks after your injury:  · Lasting (chronic) headaches.  · Dizziness or balance problems.  · Nausea.  · Vision problems.  · Increased sensitivity to noise or light.  · Depression or mood swings.  · Anxiety or irritability.  · Memory problems.  · Difficulty concentrating or paying attention.  · Sleep problems.  · Feeling tired all the time.  SEEK IMMEDIATE MEDICAL CARE IF:  · You have severe or worsening headaches. These may be a sign of a blood clot in the brain.  · You have weakness (even if only in one hand, leg, or part of the face).  · You have numbness.  · You have decreased coordination.  · You vomit repeatedly.  · You have increased sleepiness.  · One pupil is larger than the other.  · You have convulsions.  · You have slurred speech.  · You have increased confusion. This may be a sign of a blood clot in the brain.  · You have increased restlessness, agitation, or irritability.  · You are unable to recognize people or places.  · You have neck pain.  · It is difficult to wake you up.  · You have unusual behavior changes.  · You lose consciousness.  MAKE SURE YOU:  · Understand these instructions.  · Will watch your condition.  · Will get help right away if you are not doing well or get worse.     This information is not intended to replace advice given to you by your health care provider. Make sure you discuss any questions you have with your health care provider.     Document Released: 03/09/2005 Document Revised: 01/08/2016 Document Reviewed: 07/10/2014  Elsevier Interactive Patient Education ©2016  [employer].  Return if fever, vomiting or if no better in 12 hours.
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
## 2017-10-14 ENCOUNTER — HOSPITAL ENCOUNTER (EMERGENCY)
Facility: MEDICAL CENTER | Age: 52
End: 2017-10-14
Attending: EMERGENCY MEDICINE
Payer: MEDICARE

## 2017-10-14 VITALS
HEART RATE: 84 BPM | WEIGHT: 196 LBS | RESPIRATION RATE: 14 BRPM | TEMPERATURE: 98 F | SYSTOLIC BLOOD PRESSURE: 116 MMHG | DIASTOLIC BLOOD PRESSURE: 91 MMHG | HEIGHT: 73 IN | OXYGEN SATURATION: 97 % | BODY MASS INDEX: 25.98 KG/M2

## 2017-10-14 DIAGNOSIS — H54.7 POOR VISION: ICD-10-CM

## 2017-10-14 PROCEDURE — 99283 EMERGENCY DEPT VISIT LOW MDM: CPT

## 2017-10-14 NOTE — ED NOTES
Pt discharged with instructions  Pt verbalizes the understanding of instructions. Pt ambulated out of ER without any difficulty.

## 2017-10-14 NOTE — DISCHARGE INSTRUCTIONS
"Visual Disturbances  You have had a disturbance in your vision. This may be caused by various conditions, such as:  · Migraines. Migraine headaches are often preceded by a disturbance in vision. Blind spots or light flashes are followed by a headache. This type of visual disturbance is temporary. It does not damage the eye.  · Glaucoma. This is caused by increased pressure in the eye. Symptoms include haziness, blurred vision, or seeing rainbow colored circles when looking at bright lights. Partial or complete visual loss can occur. You may or may not experience eye pain. Visual loss may be gradual or sudden and is irreversible. Glaucoma is the leading cause of blindness.  · Retina problems. Vision will be reduced if the retina becomes detached or if there is a circulation problem as with diabetes, high blood pressure, or a mini-stroke. Symptoms include seeing \"floaters,\" flashes of light, or shadows, as if a curtain has fallen over your eye.  · Optic nerve problems. The main nerve in your eye can be damaged by redness, soreness, and swelling (inflammation), poor circulation, drugs, and toxins.  It is very important to have a complete exam done by a specialist to determine the exact cause of your eye problem. The specialist may recommend medicines or surgery, depending on the cause of the problem. This can help prevent further loss of vision or reduce the risk of having a stroke. Contact the caregiver to whom you have been referred and arrange for follow-up care right away.  SEEK IMMEDIATE MEDICAL CARE IF:   · Your vision gets worse.  · You develop severe headaches.  · You have any weakness or numbness in the face, arms, or legs.  · You have any trouble speaking or walking.     This information is not intended to replace advice given to you by your health care provider. Make sure you discuss any questions you have with your health care provider.     Document Released: 01/25/2006 Document Revised: 03/11/2013 Document " Reviewed: 05/27/2015  [employer] Interactive Patient Education ©2016 [employer]
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
## 2017-10-26 ENCOUNTER — OFFICE VISIT (OUTPATIENT)
Dept: NEUROLOGY | Facility: MEDICAL CENTER | Age: 52
End: 2017-10-26
Payer: MEDICARE

## 2017-10-26 VITALS
RESPIRATION RATE: 18 BRPM | HEART RATE: 72 BPM | WEIGHT: 202.4 LBS | HEIGHT: 73 IN | DIASTOLIC BLOOD PRESSURE: 74 MMHG | OXYGEN SATURATION: 96 % | SYSTOLIC BLOOD PRESSURE: 116 MMHG | TEMPERATURE: 97.8 F | BODY MASS INDEX: 26.83 KG/M2

## 2017-10-26 DIAGNOSIS — F25.9 SCHIZO AFFECTIVE SCHIZOPHRENIA (HCC): ICD-10-CM

## 2017-10-26 DIAGNOSIS — R56.9 SEIZURE (HCC): ICD-10-CM

## 2017-10-26 PROCEDURE — 99203 OFFICE O/P NEW LOW 30 MIN: CPT | Performed by: NURSE PRACTITIONER

## 2017-10-26 ASSESSMENT — ENCOUNTER SYMPTOMS
COUGH: 0
SORE THROAT: 0
NERVOUS/ANXIOUS: 0
DIARRHEA: 0
DOUBLE VISION: 0
NAUSEA: 0
VOMITING: 0
HEADACHES: 0
DEPRESSION: 1
WEIGHT LOSS: 0
ABDOMINAL PAIN: 0
MUSCULOSKELETAL NEGATIVE: 1
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
## 2017-10-30 ENCOUNTER — OFFICE VISIT (OUTPATIENT)
Dept: MEDICAL GROUP | Facility: MEDICAL CENTER | Age: 52
End: 2017-10-30
Payer: MEDICARE

## 2017-10-30 VITALS
HEART RATE: 62 BPM | HEIGHT: 72 IN | SYSTOLIC BLOOD PRESSURE: 120 MMHG | RESPIRATION RATE: 14 BRPM | WEIGHT: 194 LBS | DIASTOLIC BLOOD PRESSURE: 72 MMHG | OXYGEN SATURATION: 96 % | BODY MASS INDEX: 26.28 KG/M2

## 2017-10-30 DIAGNOSIS — H53.8 BLURRED VISION: ICD-10-CM

## 2017-10-30 DIAGNOSIS — I10 HTN (HYPERTENSION), BENIGN: ICD-10-CM

## 2017-10-30 DIAGNOSIS — R73.02 IGT (IMPAIRED GLUCOSE TOLERANCE): ICD-10-CM

## 2017-10-30 PROCEDURE — 99214 OFFICE O/P EST MOD 30 MIN: CPT | Performed by: INTERNAL MEDICINE

## 2017-10-30 NOTE — PROGRESS NOTES
CC: Multiple issues    HPI:   Haile presents today with the following.    1. Blurred vision  Patient was seen in the ER for transient blurred vision with no major findings. He does report occasional issues. No chest pain or shortness of breath no focal numbness or weakness.    2. IGT (impaired glucose tolerance)  Will be coming due for blood work after the first year denying any symptoms of lower high blood sugars.    3. HTN (hypertension), benign  Blood pressure well controlled denying any problems with activity is not monitoring blood pressure regularly      Patient Active Problem List    Diagnosis Date Noted   • Schizo affective schizophrenia (CMS-HCC) 04/22/2010     Priority: High   • Hepatitis C 05/24/2010     Priority: Medium   • HTN (hypertension), benign 04/22/2010     Priority: Medium   • GERD (gastroesophageal reflux disease) 07/14/2013     Priority: Low   • IGT (impaired glucose tolerance) 03/21/2017   • Seizure disorder (CMS-HCC) 01/17/2017   • Schizophrenia (CMS-HCC) 10/14/2016   • Encounter for long-term (current) use of other medications 01/29/2015   • Tuberculosis 04/22/2010       Current Outpatient Prescriptions   Medication Sig Dispense Refill   • QUEtiapine Fumarate (SEROQUEL PO) Take  by mouth.     • polymixin-trimethoprim (POLYTRIM) 03155-8.1 UNIT/ML-% Solution Place 2 Drops in left eye every 4 hours. 1 Bottle 0   • olanzapine zydis (ZYPREXA) 10 MG disintegrating tablet   2   • INVEGA SUSTENNA 234 MG/1.5ML Suspension   0   • metformin (GLUCOPHAGE) 500 MG Tab Take 1 Tab by mouth every day. 30 Tab 11   • DIPHENHYDRAMINE HCL PO Take 1 Tab by mouth every day.     • aspirin (ASA) 81 MG Chew Tab chewable tablet Take 81 mg by mouth every day.     • benztropine (COGENTIN) 1 MG Tab Take 1 mg by mouth 2 times a day.       No current facility-administered medications for this visit.          Allergies as of 10/30/2017 - Reviewed 10/26/2017   Allergen Reaction Noted   • Hctz  05/24/2012   • Other drug  Nausea 04/22/2010   • Vicodin [hydrocodone-acetaminophen] Vomiting 11/12/2007   • Amoxicillin Nausea 04/22/2010        ROS: As per HPI.    /72   Pulse 62   Resp 14   Ht 1.829 m (6')   Wt 88 kg (194 lb)   SpO2 96%   BMI 26.31 kg/m²      Physical Exam:  Gen:         Alert and oriented, No apparent distress.  Neck:        No Lymphadenopathy or Bruits.  Lungs:     Clear to auscultation bilaterally  CV:          Regular rate and rhythm. No murmurs, rubs or gallops.               Ext:          No clubbing, cyanosis, edema.      Assessment and Plan.   52 y.o. male with the following issues.    1. Blurred vision  Referred ophthalmology  - REFERRAL TO OPHTHALMOLOGY    2. IGT (impaired glucose tolerance)  Discussed diet exercise blood work 6 months.  - LIPID PROFILE; Future  - COMP METABOLIC PANEL; Future  - HEMOGLOBIN A1C; Future  - MICROALBUMIN CREAT RATIO URINE; Future    3. HTN (hypertension), benign  Currently well controlled, Discuss diet, exercise and salt restriction.

## 2018-02-12 DIAGNOSIS — R73.02 IGT (IMPAIRED GLUCOSE TOLERANCE): ICD-10-CM

## 2018-02-19 ENCOUNTER — HOSPITAL ENCOUNTER (EMERGENCY)
Dept: HOSPITAL 8 - ED | Age: 53
Discharge: HOME | End: 2018-02-19
Payer: MEDICARE

## 2018-02-19 VITALS — BODY MASS INDEX: 26.87 KG/M2 | WEIGHT: 198.42 LBS | HEIGHT: 72 IN

## 2018-02-19 VITALS — SYSTOLIC BLOOD PRESSURE: 116 MMHG | DIASTOLIC BLOOD PRESSURE: 88 MMHG

## 2018-02-19 DIAGNOSIS — E11.9: ICD-10-CM

## 2018-02-19 DIAGNOSIS — M25.462: ICD-10-CM

## 2018-02-19 DIAGNOSIS — M25.562: Primary | ICD-10-CM

## 2018-02-19 DIAGNOSIS — F43.10: ICD-10-CM

## 2018-02-19 DIAGNOSIS — G89.11: ICD-10-CM

## 2018-02-19 DIAGNOSIS — I10: ICD-10-CM

## 2018-02-19 PROCEDURE — 29505 APPLICATION LONG LEG SPLINT: CPT

## 2018-02-19 PROCEDURE — 99284 EMERGENCY DEPT VISIT MOD MDM: CPT

## 2018-03-18 ENCOUNTER — HOSPITAL ENCOUNTER (EMERGENCY)
Dept: HOSPITAL 8 - ED | Age: 53
Discharge: HOME | End: 2018-03-18
Payer: MEDICARE

## 2018-03-18 VITALS — SYSTOLIC BLOOD PRESSURE: 126 MMHG | DIASTOLIC BLOOD PRESSURE: 92 MMHG

## 2018-03-18 VITALS — BODY MASS INDEX: 26.43 KG/M2 | HEIGHT: 72 IN | WEIGHT: 195.11 LBS

## 2018-03-18 DIAGNOSIS — I10: ICD-10-CM

## 2018-03-18 DIAGNOSIS — F43.10: ICD-10-CM

## 2018-03-18 DIAGNOSIS — Z88.5: ICD-10-CM

## 2018-03-18 DIAGNOSIS — F25.9: ICD-10-CM

## 2018-03-18 DIAGNOSIS — E11.9: ICD-10-CM

## 2018-03-18 DIAGNOSIS — Z86.73: ICD-10-CM

## 2018-03-18 DIAGNOSIS — M94.0: Primary | ICD-10-CM

## 2018-03-18 DIAGNOSIS — I25.2: ICD-10-CM

## 2018-03-18 DIAGNOSIS — Z79.82: ICD-10-CM

## 2018-03-18 LAB
<PLATELET ESTIMATE>: ADEQUATE
<PLT MORPHOLOGY>: (no result)
ALBUMIN SERPL-MCNC: 3.5 G/DL (ref 3.4–5)
ANION GAP SERPL CALC-SCNC: 9 MMOL/L (ref 5–15)
BILIRUB DIRECT SERPL-MCNC: NORMAL MG/DL
CALCIUM SERPL-MCNC: 8.6 MG/DL (ref 8.5–10.1)
CHLORIDE SERPL-SCNC: 95 MMOL/L (ref 98–107)
CREAT SERPL-MCNC: 0.77 MG/DL (ref 0.7–1.3)
EOS#(MANUAL): 1.99 X10^3/UL (ref 0–0.4)
EOS% (MANUAL): 28 % (ref 1–7)
ERYTHROCYTE [DISTWIDTH] IN BLOOD BY AUTOMATED COUNT: 14.6 % (ref 9.4–14.8)
LYMPH#(MANUAL): 2.2 X10^3/UL (ref 1–3.4)
LYMPHS% (MANUAL): 31 % (ref 22–44)
MCH RBC QN AUTO: 31.3 PG (ref 27.5–34.5)
MCHC RBC AUTO-ENTMCNC: 33.9 G/DL (ref 33.2–36.2)
MCV RBC AUTO: 92.5 FL (ref 81–97)
MD: YES
MONOS#(MANUAL): 0.43 X10^3/UL (ref 0.3–2.7)
MONOS% (MANUAL): 6 % (ref 2–9)
PLATELET # BLD AUTO: 221 X10^3/UL (ref 130–400)
PMV BLD AUTO: 8.1 FL (ref 7.4–10.4)
RBC # BLD AUTO: 4.41 X10^6/UL (ref 4.38–5.82)
SEG#(MANUAL): 2.49 X10^3/UL (ref 1.8–6.8)
SEGS% (MANUAL): 35 % (ref 42–75)
TROPONIN I SERPL-MCNC: < 0.015 NG/ML (ref 0–0.04)

## 2018-03-18 PROCEDURE — 71045 X-RAY EXAM CHEST 1 VIEW: CPT

## 2018-03-18 PROCEDURE — 85025 COMPLETE CBC W/AUTO DIFF WBC: CPT

## 2018-03-18 PROCEDURE — 84484 ASSAY OF TROPONIN QUANT: CPT

## 2018-03-18 PROCEDURE — 82040 ASSAY OF SERUM ALBUMIN: CPT

## 2018-03-18 PROCEDURE — 36415 COLL VENOUS BLD VENIPUNCTURE: CPT

## 2018-03-18 PROCEDURE — 93005 ELECTROCARDIOGRAM TRACING: CPT

## 2018-03-18 PROCEDURE — 99285 EMERGENCY DEPT VISIT HI MDM: CPT

## 2018-03-18 PROCEDURE — 80048 BASIC METABOLIC PNL TOTAL CA: CPT

## 2018-04-13 ENCOUNTER — OFFICE VISIT (OUTPATIENT)
Dept: NEUROLOGY | Facility: MEDICAL CENTER | Age: 53
End: 2018-04-13
Payer: MEDICARE

## 2018-04-13 ENCOUNTER — TELEPHONE (OUTPATIENT)
Dept: NEUROLOGY | Facility: MEDICAL CENTER | Age: 53
End: 2018-04-13

## 2018-04-13 VITALS
WEIGHT: 197 LBS | OXYGEN SATURATION: 98 % | TEMPERATURE: 96.7 F | HEIGHT: 72 IN | BODY MASS INDEX: 26.68 KG/M2 | RESPIRATION RATE: 16 BRPM | HEART RATE: 90 BPM | SYSTOLIC BLOOD PRESSURE: 130 MMHG | DIASTOLIC BLOOD PRESSURE: 80 MMHG

## 2018-04-13 DIAGNOSIS — R53.83 OTHER FATIGUE: ICD-10-CM

## 2018-04-13 DIAGNOSIS — F25.9 SCHIZO AFFECTIVE SCHIZOPHRENIA (HCC): ICD-10-CM

## 2018-04-13 PROCEDURE — 99214 OFFICE O/P EST MOD 30 MIN: CPT | Performed by: NURSE PRACTITIONER

## 2018-04-13 ASSESSMENT — ENCOUNTER SYMPTOMS
MUSCULOSKELETAL NEGATIVE: 1
NAUSEA: 0
DEPRESSION: 1
SORE THROAT: 0
VOMITING: 0
DIARRHEA: 0
NERVOUS/ANXIOUS: 0
COUGH: 0
ABDOMINAL PAIN: 0
HEADACHES: 0
DOUBLE VISION: 0

## 2018-04-13 ASSESSMENT — PATIENT HEALTH QUESTIONNAIRE - PHQ9: CLINICAL INTERPRETATION OF PHQ2 SCORE: 0

## 2018-04-16 ENCOUNTER — OFFICE VISIT (OUTPATIENT)
Dept: MEDICAL GROUP | Facility: MEDICAL CENTER | Age: 53
End: 2018-04-16
Payer: MEDICARE

## 2018-04-16 VITALS
HEART RATE: 85 BPM | RESPIRATION RATE: 16 BRPM | SYSTOLIC BLOOD PRESSURE: 112 MMHG | OXYGEN SATURATION: 96 % | HEIGHT: 72 IN | TEMPERATURE: 98.1 F | DIASTOLIC BLOOD PRESSURE: 80 MMHG | WEIGHT: 189 LBS | BODY MASS INDEX: 25.6 KG/M2

## 2018-04-16 DIAGNOSIS — R73.02 IGT (IMPAIRED GLUCOSE TOLERANCE): ICD-10-CM

## 2018-04-16 PROCEDURE — 99213 OFFICE O/P EST LOW 20 MIN: CPT | Performed by: INTERNAL MEDICINE

## 2018-04-16 RX ORDER — BENZTROPINE MESYLATE 2 MG/1
TABLET ORAL 2 TIMES DAILY
Refills: 5 | COMMUNITY
Start: 2018-03-20 | End: 2018-06-12

## 2018-04-16 RX ORDER — DIVALPROEX SODIUM 500 MG/1
TABLET, EXTENDED RELEASE ORAL 2 TIMES DAILY
Refills: 5 | COMMUNITY
Start: 2018-03-20 | End: 2018-06-12 | Stop reason: SDUPTHER

## 2018-04-16 RX ORDER — OLANZAPINE 20 MG/1
20 TABLET ORAL
Refills: 5 | COMMUNITY
Start: 2018-03-23 | End: 2023-07-16

## 2018-04-16 NOTE — PROGRESS NOTES
CC: Follow-up elevated blood sugars.    HPI:   Haile presents today with the following.    1. IGT (impaired glucose tolerance)  Maintain on metformin was to have blood work drawn prior to visit including full chemistry and has not completed of yet. He is compliant with medication denying any symptoms of hypoglycemia.      Patient Active Problem List    Diagnosis Date Noted   • Schizo affective schizophrenia (CMS-HCC) 04/22/2010     Priority: High   • Hepatitis C 05/24/2010     Priority: Medium   • HTN (hypertension), benign 04/22/2010     Priority: Medium   • GERD (gastroesophageal reflux disease) 07/14/2013     Priority: Low   • IGT (impaired glucose tolerance) 03/21/2017   • Seizure disorder (CMS-HCC) 01/17/2017   • Schizophrenia (CMS-HCC) 10/14/2016   • Encounter for long-term (current) use of other medications 01/29/2015   • Tuberculosis 04/22/2010       Current Outpatient Prescriptions   Medication Sig Dispense Refill   • divalproex ER (DEPAKOTE ER) 500 MG TABLET SR 24 HR 2 Times a Day.  5   • benztropine (COGENTIN) 2 MG Tab 2 Times a Day.  5   • olanzapine (ZYPREXA) 20 MG tablet 2 Times a Day.  5   • metFORMIN (GLUCOPHAGE) 500 MG Tab TAKE 1 TABLET BY MOUTH ONCE DAILY 30 Tab 9   • benztropine (COGENTIN) 1 MG Tab Take 1 mg by mouth 2 times a day.     • QUEtiapine Fumarate (SEROQUEL PO) Take  by mouth.     • polymixin-trimethoprim (POLYTRIM) 88261-0.1 UNIT/ML-% Solution Place 2 Drops in left eye every 4 hours. (Patient not taking: Reported on 4/16/2018) 1 Bottle 0   • olanzapine zydis (ZYPREXA) 10 MG disintegrating tablet   2   • INVEGA SUSTENNA 234 MG/1.5ML Suspension   0   • DIPHENHYDRAMINE HCL PO Take 1 Tab by mouth every day.     • aspirin (ASA) 81 MG Chew Tab chewable tablet Take 81 mg by mouth every day.       No current facility-administered medications for this visit.          Allergies as of 04/16/2018 - Reviewed 04/16/2018   Allergen Reaction Noted   • Hctz  05/24/2012   • Other drug Nausea 04/22/2010    • Vicodin [hydrocodone-acetaminophen] Vomiting 11/12/2007   • Amoxicillin Nausea 04/22/2010        ROS: Denies Chest pain, SOB, LE edema.    /80   Pulse 85   Temp 36.7 °C (98.1 °F)   Resp 16   Ht 1.829 m (6')   Wt 85.7 kg (189 lb)   SpO2 96%   BMI 25.63 kg/m²      Physical Exam:  Gen:         Alert and oriented, No apparent distress.  Neck:        No Lymphadenopathy or Bruits.  Lungs:     Clear to auscultation bilaterally  CV:          Regular rate and rhythm. No murmurs, rubs or gallops.               Ext:          No clubbing, cyanosis, edema.      Assessment and Plan.   53 y.o. male with the following issues.    1. IGT (impaired glucose tolerance)  Encouraged to get blood work done and will follow for abnormalities otherwise will see back in 6 months.

## 2018-05-04 ENCOUNTER — HOSPITAL ENCOUNTER (OUTPATIENT)
Dept: LAB | Facility: MEDICAL CENTER | Age: 53
End: 2018-05-04
Attending: INTERNAL MEDICINE
Payer: MEDICARE

## 2018-05-04 ENCOUNTER — HOSPITAL ENCOUNTER (OUTPATIENT)
Dept: LAB | Facility: MEDICAL CENTER | Age: 53
End: 2018-05-04
Attending: NURSE PRACTITIONER
Payer: MEDICARE

## 2018-05-04 DIAGNOSIS — R53.83 OTHER FATIGUE: ICD-10-CM

## 2018-05-04 DIAGNOSIS — F25.9 SCHIZO AFFECTIVE SCHIZOPHRENIA (HCC): ICD-10-CM

## 2018-05-04 DIAGNOSIS — R73.02 IGT (IMPAIRED GLUCOSE TOLERANCE): ICD-10-CM

## 2018-05-04 LAB
ALBUMIN SERPL BCP-MCNC: 4 G/DL (ref 3.2–4.9)
ALBUMIN SERPL BCP-MCNC: 4.2 G/DL (ref 3.2–4.9)
ALBUMIN/GLOB SERPL: 1 G/DL
ALBUMIN/GLOB SERPL: 1.1 G/DL
ALP SERPL-CCNC: 51 U/L (ref 30–99)
ALP SERPL-CCNC: 61 U/L (ref 30–99)
ALT SERPL-CCNC: 8 U/L (ref 2–50)
ALT SERPL-CCNC: 9 U/L (ref 2–50)
AMMONIA PLAS-SCNC: 47 UMOL/L (ref 11–45)
ANION GAP SERPL CALC-SCNC: 10 MMOL/L (ref 0–11.9)
ANION GAP SERPL CALC-SCNC: 7 MMOL/L (ref 0–11.9)
AST SERPL-CCNC: 19 U/L (ref 12–45)
AST SERPL-CCNC: 20 U/L (ref 12–45)
BASOPHILS # BLD AUTO: 0.7 % (ref 0–1.8)
BASOPHILS # BLD: 0.04 K/UL (ref 0–0.12)
BILIRUB SERPL-MCNC: 0.6 MG/DL (ref 0.1–1.5)
BILIRUB SERPL-MCNC: 0.6 MG/DL (ref 0.1–1.5)
BUN SERPL-MCNC: 11 MG/DL (ref 8–22)
BUN SERPL-MCNC: 12 MG/DL (ref 8–22)
CALCIUM SERPL-MCNC: 9.4 MG/DL (ref 8.5–10.5)
CALCIUM SERPL-MCNC: 9.8 MG/DL (ref 8.5–10.5)
CHLORIDE SERPL-SCNC: 103 MMOL/L (ref 96–112)
CHLORIDE SERPL-SCNC: 103 MMOL/L (ref 96–112)
CHOLEST SERPL-MCNC: 127 MG/DL (ref 100–199)
CO2 SERPL-SCNC: 22 MMOL/L (ref 20–33)
CO2 SERPL-SCNC: 24 MMOL/L (ref 20–33)
CREAT SERPL-MCNC: 0.93 MG/DL (ref 0.5–1.4)
CREAT SERPL-MCNC: 0.98 MG/DL (ref 0.5–1.4)
CREAT UR-MCNC: 77.9 MG/DL
EOSINOPHIL # BLD AUTO: 1.33 K/UL (ref 0–0.51)
EOSINOPHIL NFR BLD: 24 % (ref 0–6.9)
ERYTHROCYTE [DISTWIDTH] IN BLOOD BY AUTOMATED COUNT: 46.7 FL (ref 35.9–50)
EST. AVERAGE GLUCOSE BLD GHB EST-MCNC: 117 MG/DL
GLOBULIN SER CALC-MCNC: 3.9 G/DL (ref 1.9–3.5)
GLOBULIN SER CALC-MCNC: 3.9 G/DL (ref 1.9–3.5)
GLUCOSE SERPL-MCNC: 80 MG/DL (ref 65–99)
GLUCOSE SERPL-MCNC: 90 MG/DL (ref 65–99)
HBA1C MFR BLD: 5.7 % (ref 0–5.6)
HCT VFR BLD AUTO: 43.3 % (ref 42–52)
HDLC SERPL-MCNC: 41 MG/DL
HGB BLD-MCNC: 15.2 G/DL (ref 14–18)
IMM GRANULOCYTES # BLD AUTO: 0 K/UL (ref 0–0.11)
IMM GRANULOCYTES NFR BLD AUTO: 0 % (ref 0–0.9)
LDLC SERPL CALC-MCNC: 74 MG/DL
LYMPHOCYTES # BLD AUTO: 1.64 K/UL (ref 1–4.8)
LYMPHOCYTES NFR BLD: 29.5 % (ref 22–41)
MCH RBC QN AUTO: 31.2 PG (ref 27–33)
MCHC RBC AUTO-ENTMCNC: 35.1 G/DL (ref 33.7–35.3)
MCV RBC AUTO: 88.9 FL (ref 81.4–97.8)
MICROALBUMIN UR-MCNC: 1.1 MG/DL
MICROALBUMIN/CREAT UR: 14 MG/G (ref 0–30)
MONOCYTES # BLD AUTO: 0.65 K/UL (ref 0–0.85)
MONOCYTES NFR BLD AUTO: 11.7 % (ref 0–13.4)
NEUTROPHILS # BLD AUTO: 1.89 K/UL (ref 1.82–7.42)
NEUTROPHILS NFR BLD: 34.1 % (ref 44–72)
NRBC # BLD AUTO: 0 K/UL
NRBC BLD-RTO: 0 /100 WBC
PLATELET # BLD AUTO: 204 K/UL (ref 164–446)
PMV BLD AUTO: 10 FL (ref 9–12.9)
POTASSIUM SERPL-SCNC: 3.9 MMOL/L (ref 3.6–5.5)
POTASSIUM SERPL-SCNC: 3.9 MMOL/L (ref 3.6–5.5)
PROT SERPL-MCNC: 7.9 G/DL (ref 6–8.2)
PROT SERPL-MCNC: 8.1 G/DL (ref 6–8.2)
RBC # BLD AUTO: 4.87 M/UL (ref 4.7–6.1)
SODIUM SERPL-SCNC: 134 MMOL/L (ref 135–145)
SODIUM SERPL-SCNC: 135 MMOL/L (ref 135–145)
TRIGL SERPL-MCNC: 59 MG/DL (ref 0–149)
WBC # BLD AUTO: 5.6 K/UL (ref 4.8–10.8)

## 2018-05-04 PROCEDURE — 36415 COLL VENOUS BLD VENIPUNCTURE: CPT

## 2018-05-04 PROCEDURE — 80061 LIPID PANEL: CPT | Mod: GA

## 2018-05-04 PROCEDURE — 82570 ASSAY OF URINE CREATININE: CPT

## 2018-05-04 PROCEDURE — 85025 COMPLETE CBC W/AUTO DIFF WBC: CPT

## 2018-05-04 PROCEDURE — 80053 COMPREHEN METABOLIC PANEL: CPT | Mod: 91

## 2018-05-04 PROCEDURE — 80053 COMPREHEN METABOLIC PANEL: CPT

## 2018-05-04 PROCEDURE — 83036 HEMOGLOBIN GLYCOSYLATED A1C: CPT | Mod: GA

## 2018-05-04 PROCEDURE — 82140 ASSAY OF AMMONIA: CPT

## 2018-05-04 PROCEDURE — 82043 UR ALBUMIN QUANTITATIVE: CPT

## 2018-05-11 ENCOUNTER — APPOINTMENT (OUTPATIENT)
Dept: RADIOLOGY | Facility: MEDICAL CENTER | Age: 53
DRG: 965 | End: 2018-05-11
Attending: EMERGENCY MEDICINE
Payer: MEDICARE

## 2018-05-11 ENCOUNTER — HOSPITAL ENCOUNTER (INPATIENT)
Facility: MEDICAL CENTER | Age: 53
LOS: 6 days | DRG: 965 | End: 2018-05-17
Attending: EMERGENCY MEDICINE | Admitting: SURGERY
Payer: MEDICARE

## 2018-05-11 ENCOUNTER — APPOINTMENT (OUTPATIENT)
Dept: RADIOLOGY | Facility: MEDICAL CENTER | Age: 53
DRG: 965 | End: 2018-05-11
Attending: SURGERY
Payer: MEDICARE

## 2018-05-11 DIAGNOSIS — T14.90XA TRAUMA: ICD-10-CM

## 2018-05-11 PROBLEM — Z86.59 HISTORY OF MENTAL DISORDER: Status: ACTIVE | Noted: 2018-05-11

## 2018-05-11 PROBLEM — S42.101A FRACTURE OF RIGHT SCAPULA: Status: ACTIVE | Noted: 2018-05-11

## 2018-05-11 PROBLEM — J98.4 LUNG CALCIFICATION: Status: ACTIVE | Noted: 2018-05-11

## 2018-05-11 PROBLEM — Z78.9 NO CONTRAINDICATION TO DEEP VEIN THROMBOSIS (DVT) PROPHYLAXIS: Status: ACTIVE | Noted: 2018-05-11

## 2018-05-11 PROBLEM — S32.599A FRACTURE OF MULTIPLE PUBIC RAMI (HCC): Status: ACTIVE | Noted: 2018-05-11

## 2018-05-11 LAB
ABO GROUP BLD: NORMAL
ALBUMIN SERPL BCP-MCNC: 3.5 G/DL (ref 3.2–4.9)
ALBUMIN/GLOB SERPL: 1 G/DL
ALP SERPL-CCNC: 53 U/L (ref 30–99)
ALT SERPL-CCNC: 34 U/L (ref 2–50)
ANION GAP SERPL CALC-SCNC: 6 MMOL/L (ref 0–11.9)
APTT PPP: 30.5 SEC (ref 24.7–36)
AST SERPL-CCNC: 79 U/L (ref 12–45)
BILIRUB SERPL-MCNC: 0.4 MG/DL (ref 0.1–1.5)
BLD GP AB SCN SERPL QL: NORMAL
BUN SERPL-MCNC: 12 MG/DL (ref 8–22)
CALCIUM SERPL-MCNC: 8.6 MG/DL (ref 8.5–10.5)
CHLORIDE SERPL-SCNC: 105 MMOL/L (ref 96–112)
CO2 SERPL-SCNC: 22 MMOL/L (ref 20–33)
CREAT SERPL-MCNC: 0.74 MG/DL (ref 0.5–1.4)
ERYTHROCYTE [DISTWIDTH] IN BLOOD BY AUTOMATED COUNT: 47.1 FL (ref 35.9–50)
ETHANOL BLD-MCNC: 0 G/DL
GLOBULIN SER CALC-MCNC: 3.5 G/DL (ref 1.9–3.5)
GLUCOSE SERPL-MCNC: 92 MG/DL (ref 65–99)
HCT VFR BLD AUTO: 39.3 % (ref 42–52)
HGB BLD-MCNC: 13.3 G/DL (ref 14–18)
INR PPP: 1.12 (ref 0.87–1.13)
MCH RBC QN AUTO: 30.5 PG (ref 27–33)
MCHC RBC AUTO-ENTMCNC: 33.8 G/DL (ref 33.7–35.3)
MCV RBC AUTO: 90.1 FL (ref 81.4–97.8)
PLATELET # BLD AUTO: 195 K/UL (ref 164–446)
PMV BLD AUTO: 9.4 FL (ref 9–12.9)
POTASSIUM SERPL-SCNC: 3.5 MMOL/L (ref 3.6–5.5)
PROT SERPL-MCNC: 7 G/DL (ref 6–8.2)
PROTHROMBIN TIME: 14.1 SEC (ref 12–14.6)
RBC # BLD AUTO: 4.36 M/UL (ref 4.7–6.1)
RH BLD: NORMAL
SODIUM SERPL-SCNC: 133 MMOL/L (ref 135–145)
WBC # BLD AUTO: 6.1 K/UL (ref 4.8–10.8)

## 2018-05-11 PROCEDURE — 70450 CT HEAD/BRAIN W/O DYE: CPT

## 2018-05-11 PROCEDURE — 86900 BLOOD TYPING SEROLOGIC ABO: CPT

## 2018-05-11 PROCEDURE — 96375 TX/PRO/DX INJ NEW DRUG ADDON: CPT

## 2018-05-11 PROCEDURE — 770006 HCHG ROOM/CARE - MED/SURG/GYN SEMI*

## 2018-05-11 PROCEDURE — 85730 THROMBOPLASTIN TIME PARTIAL: CPT

## 2018-05-11 PROCEDURE — 700111 HCHG RX REV CODE 636 W/ 250 OVERRIDE (IP): Performed by: SURGERY

## 2018-05-11 PROCEDURE — 85027 COMPLETE CBC AUTOMATED: CPT

## 2018-05-11 PROCEDURE — 80053 COMPREHEN METABOLIC PANEL: CPT

## 2018-05-11 PROCEDURE — 96374 THER/PROPH/DIAG INJ IV PUSH: CPT

## 2018-05-11 PROCEDURE — 86850 RBC ANTIBODY SCREEN: CPT

## 2018-05-11 PROCEDURE — 72131 CT LUMBAR SPINE W/O DYE: CPT

## 2018-05-11 PROCEDURE — 73560 X-RAY EXAM OF KNEE 1 OR 2: CPT | Mod: RT

## 2018-05-11 PROCEDURE — 80307 DRUG TEST PRSMV CHEM ANLYZR: CPT

## 2018-05-11 PROCEDURE — 72125 CT NECK SPINE W/O DYE: CPT

## 2018-05-11 PROCEDURE — 700117 HCHG RX CONTRAST REV CODE 255: Performed by: EMERGENCY MEDICINE

## 2018-05-11 PROCEDURE — 71045 X-RAY EXAM CHEST 1 VIEW: CPT

## 2018-05-11 PROCEDURE — 73600 X-RAY EXAM OF ANKLE: CPT | Mod: LT

## 2018-05-11 PROCEDURE — 36415 COLL VENOUS BLD VENIPUNCTURE: CPT

## 2018-05-11 PROCEDURE — 85610 PROTHROMBIN TIME: CPT

## 2018-05-11 PROCEDURE — 72128 CT CHEST SPINE W/O DYE: CPT

## 2018-05-11 PROCEDURE — 86901 BLOOD TYPING SEROLOGIC RH(D): CPT

## 2018-05-11 PROCEDURE — 305950 HCHG YELLOW TRAUMA ACT PRE-NOTIFY NO CC

## 2018-05-11 PROCEDURE — 72170 X-RAY EXAM OF PELVIS: CPT

## 2018-05-11 PROCEDURE — 99285 EMERGENCY DEPT VISIT HI MDM: CPT

## 2018-05-11 PROCEDURE — 71260 CT THORAX DX C+: CPT

## 2018-05-11 RX ORDER — OLANZAPINE 10 MG/1
20 TABLET ORAL 2 TIMES DAILY
COMMUNITY
End: 2018-06-12

## 2018-05-11 RX ORDER — HYDROMORPHONE HYDROCHLORIDE 2 MG/1
2-4 TABLET ORAL
Status: DISCONTINUED | OUTPATIENT
Start: 2018-05-11 | End: 2018-05-13

## 2018-05-11 RX ORDER — DOCUSATE SODIUM 100 MG/1
100 CAPSULE, LIQUID FILLED ORAL 2 TIMES DAILY
Status: DISCONTINUED | OUTPATIENT
Start: 2018-05-12 | End: 2018-05-17 | Stop reason: HOSPADM

## 2018-05-11 RX ORDER — ACETAMINOPHEN 650 MG/1
650 SUPPOSITORY RECTAL EVERY 4 HOURS PRN
Status: DISCONTINUED | OUTPATIENT
Start: 2018-05-11 | End: 2018-05-17 | Stop reason: HOSPADM

## 2018-05-11 RX ORDER — BENZTROPINE MESYLATE 2 MG/1
2 TABLET ORAL 2 TIMES DAILY
Status: DISCONTINUED | OUTPATIENT
Start: 2018-05-12 | End: 2018-05-12

## 2018-05-11 RX ORDER — ASPIRIN 325 MG
325 TABLET ORAL EVERY MORNING
COMMUNITY
End: 2018-06-12

## 2018-05-11 RX ORDER — AMOXICILLIN 250 MG
1 CAPSULE ORAL NIGHTLY
Status: DISCONTINUED | OUTPATIENT
Start: 2018-05-12 | End: 2018-05-17 | Stop reason: HOSPADM

## 2018-05-11 RX ORDER — ACETAMINOPHEN 325 MG/1
650 TABLET ORAL EVERY 6 HOURS
Status: DISPENSED | OUTPATIENT
Start: 2018-05-12 | End: 2018-05-16

## 2018-05-11 RX ORDER — ENEMA 19; 7 G/133ML; G/133ML
1 ENEMA RECTAL
Status: DISCONTINUED | OUTPATIENT
Start: 2018-05-11 | End: 2018-05-17 | Stop reason: HOSPADM

## 2018-05-11 RX ORDER — ONDANSETRON 2 MG/ML
INJECTION INTRAMUSCULAR; INTRAVENOUS
Status: COMPLETED | OUTPATIENT
Start: 2018-05-11 | End: 2018-05-11

## 2018-05-11 RX ORDER — HALOPERIDOL 5 MG/ML
5 INJECTION INTRAMUSCULAR EVERY 4 HOURS PRN
Status: DISCONTINUED | OUTPATIENT
Start: 2018-05-11 | End: 2018-05-17 | Stop reason: HOSPADM

## 2018-05-11 RX ORDER — BISACODYL 10 MG
10 SUPPOSITORY, RECTAL RECTAL
Status: DISCONTINUED | OUTPATIENT
Start: 2018-05-11 | End: 2018-05-17 | Stop reason: HOSPADM

## 2018-05-11 RX ORDER — POLYETHYLENE GLYCOL 3350 17 G/17G
1 POWDER, FOR SOLUTION ORAL 2 TIMES DAILY
Status: DISCONTINUED | OUTPATIENT
Start: 2018-05-12 | End: 2018-05-17 | Stop reason: HOSPADM

## 2018-05-11 RX ORDER — SODIUM CHLORIDE, SODIUM LACTATE, POTASSIUM CHLORIDE, CALCIUM CHLORIDE 600; 310; 30; 20 MG/100ML; MG/100ML; MG/100ML; MG/100ML
INJECTION, SOLUTION INTRAVENOUS CONTINUOUS
Status: DISCONTINUED | OUTPATIENT
Start: 2018-05-12 | End: 2018-05-14

## 2018-05-11 RX ORDER — CHLORHEXIDINE GLUCONATE ORAL RINSE 1.2 MG/ML
15 SOLUTION DENTAL EVERY 12 HOURS
Status: DISCONTINUED | OUTPATIENT
Start: 2018-05-12 | End: 2018-05-12

## 2018-05-11 RX ORDER — FAMOTIDINE 20 MG/1
20 TABLET, FILM COATED ORAL 2 TIMES DAILY
Status: DISCONTINUED | OUTPATIENT
Start: 2018-05-12 | End: 2018-05-17 | Stop reason: HOSPADM

## 2018-05-11 RX ORDER — DIVALPROEX SODIUM 500 MG/1
500 TABLET, EXTENDED RELEASE ORAL EVERY 12 HOURS
Status: DISCONTINUED | OUTPATIENT
Start: 2018-05-12 | End: 2018-05-12

## 2018-05-11 RX ORDER — AMOXICILLIN 250 MG
1 CAPSULE ORAL
Status: DISCONTINUED | OUTPATIENT
Start: 2018-05-11 | End: 2018-05-17 | Stop reason: HOSPADM

## 2018-05-11 RX ORDER — ACETAMINOPHEN 325 MG/1
650 TABLET ORAL EVERY 4 HOURS PRN
Status: DISCONTINUED | OUTPATIENT
Start: 2018-05-11 | End: 2018-05-17 | Stop reason: HOSPADM

## 2018-05-11 RX ORDER — ONDANSETRON 2 MG/ML
4 INJECTION INTRAMUSCULAR; INTRAVENOUS EVERY 4 HOURS PRN
Status: DISCONTINUED | OUTPATIENT
Start: 2018-05-11 | End: 2018-05-17 | Stop reason: HOSPADM

## 2018-05-11 RX ORDER — DEXTROSE MONOHYDRATE 25 G/50ML
25 INJECTION, SOLUTION INTRAVENOUS
Status: DISCONTINUED | OUTPATIENT
Start: 2018-05-11 | End: 2018-05-17 | Stop reason: HOSPADM

## 2018-05-11 RX ADMIN — HYDROMORPHONE HYDROCHLORIDE 1 MG: 10 INJECTION, SOLUTION INTRAMUSCULAR; INTRAVENOUS; SUBCUTANEOUS at 23:16

## 2018-05-11 RX ADMIN — ONDANSETRON HYDROCHLORIDE 4 MG: 2 INJECTION, SOLUTION INTRAMUSCULAR; INTRAVENOUS at 22:38

## 2018-05-11 RX ADMIN — IOHEXOL 100 ML: 350 INJECTION, SOLUTION INTRAVENOUS at 22:58

## 2018-05-12 ENCOUNTER — APPOINTMENT (OUTPATIENT)
Dept: RADIOLOGY | Facility: MEDICAL CENTER | Age: 53
DRG: 965 | End: 2018-05-12
Attending: ORTHOPAEDIC SURGERY
Payer: MEDICARE

## 2018-05-12 ENCOUNTER — APPOINTMENT (OUTPATIENT)
Dept: RADIOLOGY | Facility: MEDICAL CENTER | Age: 53
DRG: 965 | End: 2018-05-12
Attending: NURSE PRACTITIONER
Payer: MEDICARE

## 2018-05-12 PROBLEM — E11.9 TYPE 2 DIABETES MELLITUS (HCC): Status: ACTIVE | Noted: 2018-05-12

## 2018-05-12 LAB
ABO GROUP BLD: NORMAL
EKG IMPRESSION: NORMAL
GLUCOSE BLD-MCNC: 116 MG/DL (ref 65–99)
GLUCOSE BLD-MCNC: 132 MG/DL (ref 65–99)
GLUCOSE BLD-MCNC: 139 MG/DL (ref 65–99)
GLUCOSE BLD-MCNC: 143 MG/DL (ref 65–99)
RH BLD: NORMAL
TROPONIN I SERPL-MCNC: <0.01 NG/ML (ref 0–0.04)

## 2018-05-12 PROCEDURE — 94640 AIRWAY INHALATION TREATMENT: CPT

## 2018-05-12 PROCEDURE — 700111 HCHG RX REV CODE 636 W/ 250 OVERRIDE (IP): Performed by: SURGERY

## 2018-05-12 PROCEDURE — 72190 X-RAY EXAM OF PELVIS: CPT

## 2018-05-12 PROCEDURE — A9270 NON-COVERED ITEM OR SERVICE: HCPCS | Performed by: SURGERY

## 2018-05-12 PROCEDURE — 700112 HCHG RX REV CODE 229: Performed by: SURGERY

## 2018-05-12 PROCEDURE — 71045 X-RAY EXAM CHEST 1 VIEW: CPT

## 2018-05-12 PROCEDURE — 82962 GLUCOSE BLOOD TEST: CPT | Mod: 91

## 2018-05-12 PROCEDURE — 700105 HCHG RX REV CODE 258: Performed by: NURSE PRACTITIONER

## 2018-05-12 PROCEDURE — 700105 HCHG RX REV CODE 258: Performed by: SURGERY

## 2018-05-12 PROCEDURE — 700101 HCHG RX REV CODE 250: Performed by: SURGERY

## 2018-05-12 PROCEDURE — A9270 NON-COVERED ITEM OR SERVICE: HCPCS | Performed by: NURSE PRACTITIONER

## 2018-05-12 PROCEDURE — 770006 HCHG ROOM/CARE - MED/SURG/GYN SEMI*

## 2018-05-12 PROCEDURE — 93010 ELECTROCARDIOGRAM REPORT: CPT | Performed by: INTERNAL MEDICINE

## 2018-05-12 PROCEDURE — 700102 HCHG RX REV CODE 250 W/ 637 OVERRIDE(OP): Performed by: NURSE PRACTITIONER

## 2018-05-12 PROCEDURE — 84484 ASSAY OF TROPONIN QUANT: CPT

## 2018-05-12 PROCEDURE — 700102 HCHG RX REV CODE 250 W/ 637 OVERRIDE(OP): Performed by: SURGERY

## 2018-05-12 PROCEDURE — 93005 ELECTROCARDIOGRAM TRACING: CPT | Performed by: NURSE PRACTITIONER

## 2018-05-12 PROCEDURE — 94760 N-INVAS EAR/PLS OXIMETRY 1: CPT

## 2018-05-12 PROCEDURE — 73030 X-RAY EXAM OF SHOULDER: CPT | Mod: RT

## 2018-05-12 PROCEDURE — 36415 COLL VENOUS BLD VENIPUNCTURE: CPT

## 2018-05-12 RX ORDER — DIVALPROEX SODIUM 500 MG/1
1000 TABLET, EXTENDED RELEASE ORAL EVERY 12 HOURS
Status: DISCONTINUED | OUTPATIENT
Start: 2018-05-12 | End: 2018-05-17 | Stop reason: HOSPADM

## 2018-05-12 RX ORDER — IPRATROPIUM BROMIDE AND ALBUTEROL SULFATE 2.5; .5 MG/3ML; MG/3ML
3 SOLUTION RESPIRATORY (INHALATION) 4 TIMES DAILY
Status: DISCONTINUED | OUTPATIENT
Start: 2018-05-12 | End: 2018-05-12

## 2018-05-12 RX ORDER — OLANZAPINE 5 MG/1
10 TABLET ORAL EVERY 12 HOURS
Status: DISCONTINUED | OUTPATIENT
Start: 2018-05-12 | End: 2018-05-12

## 2018-05-12 RX ORDER — OLANZAPINE 5 MG/1
20 TABLET ORAL EVERY 12 HOURS
Status: DISCONTINUED | OUTPATIENT
Start: 2018-05-12 | End: 2018-05-17 | Stop reason: HOSPADM

## 2018-05-12 RX ORDER — TAMSULOSIN HYDROCHLORIDE 0.4 MG/1
0.4 CAPSULE ORAL
Status: DISCONTINUED | OUTPATIENT
Start: 2018-05-12 | End: 2018-05-17 | Stop reason: HOSPADM

## 2018-05-12 RX ORDER — SODIUM CHLORIDE 9 MG/ML
500 INJECTION, SOLUTION INTRAVENOUS ONCE
Status: COMPLETED | OUTPATIENT
Start: 2018-05-12 | End: 2018-05-12

## 2018-05-12 RX ORDER — IPRATROPIUM BROMIDE AND ALBUTEROL SULFATE 2.5; .5 MG/3ML; MG/3ML
SOLUTION RESPIRATORY (INHALATION)
Status: ACTIVE
Start: 2018-05-12 | End: 2018-05-12

## 2018-05-12 RX ORDER — IPRATROPIUM BROMIDE AND ALBUTEROL SULFATE 2.5; .5 MG/3ML; MG/3ML
3 SOLUTION RESPIRATORY (INHALATION) 4 TIMES DAILY
Status: DISCONTINUED | OUTPATIENT
Start: 2018-05-13 | End: 2018-05-13

## 2018-05-12 RX ADMIN — POLYETHYLENE GLYCOL 3350 1 PACKET: 17 POWDER, FOR SOLUTION ORAL at 09:13

## 2018-05-12 RX ADMIN — ENOXAPARIN SODIUM 30 MG: 100 INJECTION SUBCUTANEOUS at 01:13

## 2018-05-12 RX ADMIN — SODIUM CHLORIDE, POTASSIUM CHLORIDE, SODIUM LACTATE AND CALCIUM CHLORIDE: 600; 310; 30; 20 INJECTION, SOLUTION INTRAVENOUS at 01:23

## 2018-05-12 RX ADMIN — DOCUSATE SODIUM 100 MG: 100 CAPSULE ORAL at 20:40

## 2018-05-12 RX ADMIN — DOCUSATE SODIUM 100 MG: 100 CAPSULE ORAL at 09:03

## 2018-05-12 RX ADMIN — BENZTROPINE MESYLATE 3 MG: 2 TABLET ORAL at 20:41

## 2018-05-12 RX ADMIN — SODIUM CHLORIDE 500 ML: 9 INJECTION, SOLUTION INTRAVENOUS at 03:07

## 2018-05-12 RX ADMIN — HYDROMORPHONE HYDROCHLORIDE 4 MG: 2 TABLET ORAL at 06:00

## 2018-05-12 RX ADMIN — DIVALPROEX SODIUM 1000 MG: 500 TABLET, EXTENDED RELEASE ORAL at 20:41

## 2018-05-12 RX ADMIN — CHLORHEXIDINE GLUCONATE 15 ML: 1.2 RINSE ORAL at 01:15

## 2018-05-12 RX ADMIN — STANDARDIZED SENNA CONCENTRATE AND DOCUSATE SODIUM 1 TABLET: 8.6; 5 TABLET, FILM COATED ORAL at 20:40

## 2018-05-12 RX ADMIN — HYDROMORPHONE HYDROCHLORIDE 4 MG: 2 TABLET ORAL at 21:01

## 2018-05-12 RX ADMIN — TAMSULOSIN HYDROCHLORIDE 0.4 MG: 0.4 CAPSULE ORAL at 15:55

## 2018-05-12 RX ADMIN — ENOXAPARIN SODIUM 30 MG: 100 INJECTION SUBCUTANEOUS at 09:51

## 2018-05-12 RX ADMIN — BENZTROPINE 2 MG: 2 TABLET ORAL at 09:29

## 2018-05-12 RX ADMIN — DIVALPROEX SODIUM 500 MG: 500 TABLET, EXTENDED RELEASE ORAL at 09:03

## 2018-05-12 RX ADMIN — MAGNESIUM HYDROXIDE 30 ML: 400 SUSPENSION ORAL at 09:13

## 2018-05-12 RX ADMIN — ENOXAPARIN SODIUM 30 MG: 100 INJECTION SUBCUTANEOUS at 21:09

## 2018-05-12 RX ADMIN — FAMOTIDINE 20 MG: 20 TABLET ORAL at 09:12

## 2018-05-12 RX ADMIN — OLANZAPINE 10 MG: 5 TABLET, FILM COATED ORAL at 09:03

## 2018-05-12 RX ADMIN — OLANZAPINE 20 MG: 5 TABLET, FILM COATED ORAL at 20:39

## 2018-05-12 RX ADMIN — HYDROMORPHONE HYDROCHLORIDE 1 MG: 10 INJECTION, SOLUTION INTRAMUSCULAR; INTRAVENOUS; SUBCUTANEOUS at 09:29

## 2018-05-12 RX ADMIN — CHLORHEXIDINE GLUCONATE 15 ML: 1.2 RINSE ORAL at 09:03

## 2018-05-12 RX ADMIN — IPRATROPIUM BROMIDE AND ALBUTEROL SULFATE 3 ML: .5; 3 SOLUTION RESPIRATORY (INHALATION) at 06:11

## 2018-05-12 RX ADMIN — POLYETHYLENE GLYCOL 3350 1 PACKET: 17 POWDER, FOR SOLUTION ORAL at 01:13

## 2018-05-12 RX ADMIN — HYDROMORPHONE HYDROCHLORIDE 2 MG: 2 TABLET ORAL at 01:13

## 2018-05-12 RX ADMIN — IPRATROPIUM BROMIDE AND ALBUTEROL SULFATE 3 ML: .5; 3 SOLUTION RESPIRATORY (INHALATION) at 10:07

## 2018-05-12 RX ADMIN — SODIUM CHLORIDE, POTASSIUM CHLORIDE, SODIUM LACTATE AND CALCIUM CHLORIDE: 600; 310; 30; 20 INJECTION, SOLUTION INTRAVENOUS at 12:37

## 2018-05-12 RX ADMIN — STANDARDIZED SENNA CONCENTRATE AND DOCUSATE SODIUM 1 TABLET: 8.6; 5 TABLET, FILM COATED ORAL at 01:13

## 2018-05-12 RX ADMIN — FAMOTIDINE 20 MG: 20 TABLET ORAL at 20:40

## 2018-05-12 RX ADMIN — IPRATROPIUM BROMIDE AND ALBUTEROL SULFATE 3 ML: .5; 3 SOLUTION RESPIRATORY (INHALATION) at 21:49

## 2018-05-12 ASSESSMENT — PAIN SCALES - GENERAL
PAINLEVEL_OUTOF10: 7
PAINLEVEL_OUTOF10: 7
PAINLEVEL_OUTOF10: ASSUMED PAIN PRESENT
PAINLEVEL_OUTOF10: 7
PAINLEVEL_OUTOF10: 8
PAINLEVEL_OUTOF10: 5
PAINLEVEL_OUTOF10: 7

## 2018-05-12 ASSESSMENT — LIFESTYLE VARIABLES
EVER_SMOKED: YES
ALCOHOL_USE: NO
EVER_SMOKED: YES
EVER_SMOKED: YES

## 2018-05-12 ASSESSMENT — COPD QUESTIONNAIRES
DURING THE PAST 4 WEEKS HOW MUCH DID YOU FEEL SHORT OF BREATH: NONE/LITTLE OF THE TIME
COPD SCREENING SCORE: 4
HAVE YOU SMOKED AT LEAST 100 CIGARETTES IN YOUR ENTIRE LIFE: YES
DO YOU EVER COUGH UP ANY MUCUS OR PHLEGM?: NO/ONLY WITH OCCASIONAL COLDS OR INFECTIONS

## 2018-05-12 ASSESSMENT — ENCOUNTER SYMPTOMS
CARDIOVASCULAR NEGATIVE: 1
CONSTITUTIONAL NEGATIVE: 1
EYES NEGATIVE: 1
NEUROLOGICAL NEGATIVE: 1
ABDOMINAL PAIN: 0
MYALGIAS: 1
RESPIRATORY NEGATIVE: 1
GASTROINTESTINAL NEGATIVE: 1
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
## 2018-05-12 NOTE — PROGRESS NOTES
Pulled 2 oral dialudid pills and only gave one to the pt. Rosalina SIMMONS witnessed this RN return to the med select.

## 2018-05-12 NOTE — PROGRESS NOTES
Blood pressure 117/85, pulse 95, temperature 36.6 °C (97.9 °F), resp. rate 22, height 1.829 m (6'), weight 86.2 kg (190 lb), SpO2 98 %.    Notified by nursing regarding onset of chest pain and shortness of breath during transfer to a bed on orthopedic unit. The patient was moved from ER [facility] to a bed and turned for assessment when he began to complain of worsening shortness of breath and chest pain. Rapid response was called.    Patient assessed. Lunch sounds throughout with wheezes noted. Patient speech is guarded with shallow breaths due to pain (this is unchanged from the presentation in trauma bay). He is alert and oriented.     Plan:  - respiratory treatment given with improvement of dyspnea  - CXR grossly unchanged from previous  - EKG unremarkable   - Troponin pending (pt has a history of MI in 1995)  - analgesia    Please call Trauma APRN with any further concerns.

## 2018-05-12 NOTE — CARE PLAN
Problem: Safety  Goal: Will remain free from injury  Outcome: PROGRESSING AS EXPECTED  Call bell within reach, hourly rounding, precautions in place    Problem: Pain Management  Goal: Pain level will decrease to patient's comfort goal  Outcome: PROGRESSING AS EXPECTED  PRN dilaudid given with positive outcome

## 2018-05-12 NOTE — PROGRESS NOTES
Right acromion fx  Stable pelvic ring injury  Check baseline xrays  Likely nonop  Will leave WB recommendations after review of xrays

## 2018-05-12 NOTE — PROGRESS NOTES
This Rn completed what she could for admission assessment. Pt is groggy, he had dilaudid earlier. He hasn't voided on this shift, he has tried and unable to go, bladder scan was ordered.  Will continue to monitor
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
## 2018-05-12 NOTE — PROGRESS NOTES
Pt arrived to the floor at 0035. Pt was transferred to the bed. 2 RN skin check was done. Pt has 2 abrasions on his left forehead. Pt has an abrasion on his right should. Pts hands had abrasions. Pts right knee has an abrasion. Pts left foot has an abrasion. Pt has dried blood in the mouth. Could not see where he had a cut.   After 2 RN skin check the pt became SOB and complained of chest pain. A rapid was called on the pt and the APRN was contacted.

## 2018-05-12 NOTE — THERAPY
PT eval attempted; hold per RN; waiting for clarification with HOB and WB restrictions. Re-attempt as pt appropriate.

## 2018-05-12 NOTE — CARE PLAN
Problem: Infection  Goal: Will remain free from infection  Outcome: PROGRESSING AS EXPECTED  No s/s of infection. Before and after entering the room hands are washed. Before IV access the hub is scrubbed for 15 secs and allowed to dry.    Problem: Bowel/Gastric:  Goal: Normal bowel function is maintained or improved  Outcome: PROGRESSING AS EXPECTED  Pt had a BM the morning before coming here. Pt took stool softener and miralax this AM.

## 2018-05-12 NOTE — PROGRESS NOTES
Bladder scan showed 737cc    NP Jessika was made aware, order for boyer. Boyer was placed, pt tolerated well.     RN also called Xray, they had to move him back to 1750, Rn asked them to push up the time if possible.
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
## 2018-05-12 NOTE — PROGRESS NOTES
RN tried to do med rec with pt. However, he is unsure of the doses. Pharmacist Courtney was made aware.

## 2018-05-13 ENCOUNTER — APPOINTMENT (OUTPATIENT)
Dept: RADIOLOGY | Facility: MEDICAL CENTER | Age: 53
DRG: 965 | End: 2018-05-13
Attending: NURSE PRACTITIONER
Payer: MEDICARE

## 2018-05-13 LAB
ALBUMIN SERPL BCP-MCNC: 3.3 G/DL (ref 3.2–4.9)
ALBUMIN/GLOB SERPL: 1.2 G/DL
ALP SERPL-CCNC: 42 U/L (ref 30–99)
ALT SERPL-CCNC: 24 U/L (ref 2–50)
ANION GAP SERPL CALC-SCNC: 4 MMOL/L (ref 0–11.9)
AST SERPL-CCNC: 55 U/L (ref 12–45)
BASOPHILS # BLD AUTO: 0.4 % (ref 0–1.8)
BASOPHILS # BLD AUTO: 0.4 % (ref 0–1.8)
BASOPHILS # BLD: 0.03 K/UL (ref 0–0.12)
BASOPHILS # BLD: 0.04 K/UL (ref 0–0.12)
BILIRUB SERPL-MCNC: 0.6 MG/DL (ref 0.1–1.5)
BUN SERPL-MCNC: 16 MG/DL (ref 8–22)
CALCIUM SERPL-MCNC: 8.2 MG/DL (ref 8.5–10.5)
CHLORIDE SERPL-SCNC: 106 MMOL/L (ref 96–112)
CO2 SERPL-SCNC: 26 MMOL/L (ref 20–33)
CREAT SERPL-MCNC: 1.17 MG/DL (ref 0.5–1.4)
EOSINOPHIL # BLD AUTO: 0.09 K/UL (ref 0–0.51)
EOSINOPHIL # BLD AUTO: 0.09 K/UL (ref 0–0.51)
EOSINOPHIL NFR BLD: 0.9 % (ref 0–6.9)
EOSINOPHIL NFR BLD: 1.3 % (ref 0–6.9)
ERYTHROCYTE [DISTWIDTH] IN BLOOD BY AUTOMATED COUNT: 48.7 FL (ref 35.9–50)
ERYTHROCYTE [DISTWIDTH] IN BLOOD BY AUTOMATED COUNT: 49.5 FL (ref 35.9–50)
GLOBULIN SER CALC-MCNC: 2.8 G/DL (ref 1.9–3.5)
GLUCOSE BLD-MCNC: 112 MG/DL (ref 65–99)
GLUCOSE BLD-MCNC: 114 MG/DL (ref 65–99)
GLUCOSE BLD-MCNC: 141 MG/DL (ref 65–99)
GLUCOSE SERPL-MCNC: 112 MG/DL (ref 65–99)
HCT VFR BLD AUTO: 27.8 % (ref 42–52)
HCT VFR BLD AUTO: 28.3 % (ref 42–52)
HGB BLD-MCNC: 9.3 G/DL (ref 14–18)
HGB BLD-MCNC: 9.4 G/DL (ref 14–18)
IMM GRANULOCYTES # BLD AUTO: 0.02 K/UL (ref 0–0.11)
IMM GRANULOCYTES # BLD AUTO: 0.04 K/UL (ref 0–0.11)
IMM GRANULOCYTES NFR BLD AUTO: 0.3 % (ref 0–0.9)
IMM GRANULOCYTES NFR BLD AUTO: 0.4 % (ref 0–0.9)
LYMPHOCYTES # BLD AUTO: 1.44 K/UL (ref 1–4.8)
LYMPHOCYTES # BLD AUTO: 1.44 K/UL (ref 1–4.8)
LYMPHOCYTES NFR BLD: 14.5 % (ref 22–41)
LYMPHOCYTES NFR BLD: 21.5 % (ref 22–41)
MCH RBC QN AUTO: 30.6 PG (ref 27–33)
MCH RBC QN AUTO: 31 PG (ref 27–33)
MCHC RBC AUTO-ENTMCNC: 33.2 G/DL (ref 33.7–35.3)
MCHC RBC AUTO-ENTMCNC: 33.5 G/DL (ref 33.7–35.3)
MCV RBC AUTO: 92.2 FL (ref 81.4–97.8)
MCV RBC AUTO: 92.7 FL (ref 81.4–97.8)
MONOCYTES # BLD AUTO: 1.05 K/UL (ref 0–0.85)
MONOCYTES # BLD AUTO: 1.45 K/UL (ref 0–0.85)
MONOCYTES NFR BLD AUTO: 14.6 % (ref 0–13.4)
MONOCYTES NFR BLD AUTO: 15.6 % (ref 0–13.4)
NEUTROPHILS # BLD AUTO: 4.08 K/UL (ref 1.82–7.42)
NEUTROPHILS # BLD AUTO: 6.85 K/UL (ref 1.82–7.42)
NEUTROPHILS NFR BLD: 60.9 % (ref 44–72)
NEUTROPHILS NFR BLD: 69.2 % (ref 44–72)
NRBC # BLD AUTO: 0 K/UL
NRBC # BLD AUTO: 0 K/UL
NRBC BLD-RTO: 0 /100 WBC
NRBC BLD-RTO: 0 /100 WBC
PLATELET # BLD AUTO: 152 K/UL (ref 164–446)
PLATELET # BLD AUTO: 152 K/UL (ref 164–446)
PMV BLD AUTO: 9.8 FL (ref 9–12.9)
PMV BLD AUTO: 9.9 FL (ref 9–12.9)
POTASSIUM SERPL-SCNC: 4.7 MMOL/L (ref 3.6–5.5)
PROT SERPL-MCNC: 6.1 G/DL (ref 6–8.2)
RBC # BLD AUTO: 3 M/UL (ref 4.7–6.1)
RBC # BLD AUTO: 3.07 M/UL (ref 4.7–6.1)
SODIUM SERPL-SCNC: 136 MMOL/L (ref 135–145)
WBC # BLD AUTO: 6.7 K/UL (ref 4.8–10.8)
WBC # BLD AUTO: 9.9 K/UL (ref 4.8–10.8)

## 2018-05-13 PROCEDURE — 97166 OT EVAL MOD COMPLEX 45 MIN: CPT

## 2018-05-13 PROCEDURE — A9270 NON-COVERED ITEM OR SERVICE: HCPCS | Performed by: SURGERY

## 2018-05-13 PROCEDURE — 700102 HCHG RX REV CODE 250 W/ 637 OVERRIDE(OP): Performed by: SURGERY

## 2018-05-13 PROCEDURE — 700101 HCHG RX REV CODE 250: Performed by: SURGERY

## 2018-05-13 PROCEDURE — 36415 COLL VENOUS BLD VENIPUNCTURE: CPT

## 2018-05-13 PROCEDURE — 700112 HCHG RX REV CODE 229: Performed by: SURGERY

## 2018-05-13 PROCEDURE — 770006 HCHG ROOM/CARE - MED/SURG/GYN SEMI*

## 2018-05-13 PROCEDURE — 94760 N-INVAS EAR/PLS OXIMETRY 1: CPT

## 2018-05-13 PROCEDURE — G8979 MOBILITY GOAL STATUS: HCPCS | Mod: CK

## 2018-05-13 PROCEDURE — 70450 CT HEAD/BRAIN W/O DYE: CPT

## 2018-05-13 PROCEDURE — 700102 HCHG RX REV CODE 250 W/ 637 OVERRIDE(OP): Performed by: NURSE PRACTITIONER

## 2018-05-13 PROCEDURE — 700111 HCHG RX REV CODE 636 W/ 250 OVERRIDE (IP): Performed by: SURGERY

## 2018-05-13 PROCEDURE — G8988 SELF CARE GOAL STATUS: HCPCS | Mod: CI

## 2018-05-13 PROCEDURE — G8987 SELF CARE CURRENT STATUS: HCPCS | Mod: CL

## 2018-05-13 PROCEDURE — 82962 GLUCOSE BLOOD TEST: CPT | Mod: 91

## 2018-05-13 PROCEDURE — A9270 NON-COVERED ITEM OR SERVICE: HCPCS | Performed by: NURSE PRACTITIONER

## 2018-05-13 PROCEDURE — 94640 AIRWAY INHALATION TREATMENT: CPT

## 2018-05-13 PROCEDURE — 97161 PT EVAL LOW COMPLEX 20 MIN: CPT

## 2018-05-13 PROCEDURE — G8978 MOBILITY CURRENT STATUS: HCPCS | Mod: CN

## 2018-05-13 PROCEDURE — 85025 COMPLETE CBC W/AUTO DIFF WBC: CPT

## 2018-05-13 PROCEDURE — 80053 COMPREHEN METABOLIC PANEL: CPT

## 2018-05-13 RX ORDER — HYDROMORPHONE HYDROCHLORIDE 2 MG/1
1-2 TABLET ORAL EVERY 4 HOURS PRN
Status: DISCONTINUED | OUTPATIENT
Start: 2018-05-13 | End: 2018-05-16

## 2018-05-13 RX ADMIN — ACETAMINOPHEN 650 MG: 325 TABLET, FILM COATED ORAL at 23:14

## 2018-05-13 RX ADMIN — DOCUSATE SODIUM 100 MG: 100 CAPSULE ORAL at 08:26

## 2018-05-13 RX ADMIN — BENZTROPINE MESYLATE 3 MG: 2 TABLET ORAL at 22:00

## 2018-05-13 RX ADMIN — ACETAMINOPHEN 650 MG: 325 TABLET, FILM COATED ORAL at 01:03

## 2018-05-13 RX ADMIN — DIVALPROEX SODIUM 1000 MG: 500 TABLET, EXTENDED RELEASE ORAL at 21:59

## 2018-05-13 RX ADMIN — STANDARDIZED SENNA CONCENTRATE AND DOCUSATE SODIUM 1 TABLET: 8.6; 5 TABLET, FILM COATED ORAL at 22:03

## 2018-05-13 RX ADMIN — MAGNESIUM HYDROXIDE 30 ML: 400 SUSPENSION ORAL at 08:31

## 2018-05-13 RX ADMIN — ACETAMINOPHEN 650 MG: 325 TABLET, FILM COATED ORAL at 06:13

## 2018-05-13 RX ADMIN — ENOXAPARIN SODIUM 30 MG: 100 INJECTION SUBCUTANEOUS at 22:00

## 2018-05-13 RX ADMIN — HYDROMORPHONE HYDROCHLORIDE 4 MG: 2 TABLET ORAL at 01:03

## 2018-05-13 RX ADMIN — IPRATROPIUM BROMIDE AND ALBUTEROL SULFATE 3 ML: .5; 3 SOLUTION RESPIRATORY (INHALATION) at 20:12

## 2018-05-13 RX ADMIN — DOCUSATE SODIUM 100 MG: 100 CAPSULE ORAL at 21:59

## 2018-05-13 RX ADMIN — OLANZAPINE 20 MG: 5 TABLET, FILM COATED ORAL at 21:59

## 2018-05-13 RX ADMIN — METFORMIN HYDROCHLORIDE 500 MG: 500 TABLET, FILM COATED ORAL at 08:26

## 2018-05-13 RX ADMIN — FAMOTIDINE 20 MG: 20 TABLET ORAL at 22:00

## 2018-05-13 RX ADMIN — POLYETHYLENE GLYCOL 3350 1 PACKET: 17 POWDER, FOR SOLUTION ORAL at 08:31

## 2018-05-13 RX ADMIN — IPRATROPIUM BROMIDE AND ALBUTEROL SULFATE 3 ML: .5; 3 SOLUTION RESPIRATORY (INHALATION) at 07:40

## 2018-05-13 RX ADMIN — TAMSULOSIN HYDROCHLORIDE 0.4 MG: 0.4 CAPSULE ORAL at 08:26

## 2018-05-13 RX ADMIN — POLYETHYLENE GLYCOL 3350 1 PACKET: 17 POWDER, FOR SOLUTION ORAL at 22:00

## 2018-05-13 RX ADMIN — ACETAMINOPHEN 650 MG: 325 TABLET, FILM COATED ORAL at 17:35

## 2018-05-13 RX ADMIN — ACETAMINOPHEN 650 MG: 325 TABLET, FILM COATED ORAL at 11:38

## 2018-05-13 RX ADMIN — IPRATROPIUM BROMIDE AND ALBUTEROL SULFATE 3 ML: .5; 3 SOLUTION RESPIRATORY (INHALATION) at 11:19

## 2018-05-13 RX ADMIN — DIVALPROEX SODIUM 1000 MG: 500 TABLET, EXTENDED RELEASE ORAL at 08:26

## 2018-05-13 RX ADMIN — ENOXAPARIN SODIUM 30 MG: 100 INJECTION SUBCUTANEOUS at 08:26

## 2018-05-13 RX ADMIN — OLANZAPINE 20 MG: 5 TABLET, FILM COATED ORAL at 08:26

## 2018-05-13 RX ADMIN — BENZTROPINE MESYLATE 3 MG: 2 TABLET ORAL at 08:29

## 2018-05-13 RX ADMIN — FAMOTIDINE 20 MG: 20 TABLET ORAL at 08:26

## 2018-05-13 ASSESSMENT — COGNITIVE AND FUNCTIONAL STATUS - GENERAL
MOVING TO AND FROM BED TO CHAIR: UNABLE
DRESSING REGULAR UPPER BODY CLOTHING: A LOT
STANDING UP FROM CHAIR USING ARMS: TOTAL
SUGGESTED CMS G CODE MODIFIER DAILY ACTIVITY: CL
PERSONAL GROOMING: A LOT
EATING MEALS: A LOT
HELP NEEDED FOR BATHING: A LOT
MOVING FROM LYING ON BACK TO SITTING ON SIDE OF FLAT BED: UNABLE
DAILY ACTIVITIY SCORE: 12
TOILETING: A LOT
DRESSING REGULAR LOWER BODY CLOTHING: A LOT
TURNING FROM BACK TO SIDE WHILE IN FLAT BAD: UNABLE
WALKING IN HOSPITAL ROOM: TOTAL
CLIMB 3 TO 5 STEPS WITH RAILING: TOTAL
SUGGESTED CMS G CODE MODIFIER MOBILITY: CN
MOBILITY SCORE: 6

## 2018-05-13 ASSESSMENT — PAIN SCALES - GENERAL
PAINLEVEL_OUTOF10: 2
PAINLEVEL_OUTOF10: 5
PAINLEVEL_OUTOF10: 7
PAINLEVEL_OUTOF10: 1
PAINLEVEL_OUTOF10: 6
PAINLEVEL_OUTOF10: 5

## 2018-05-13 ASSESSMENT — ENCOUNTER SYMPTOMS
MYALGIAS: 1
CONSTITUTIONAL NEGATIVE: 1
GASTROINTESTINAL NEGATIVE: 1
CARDIOVASCULAR NEGATIVE: 1
ABDOMINAL PAIN: 0
EYES NEGATIVE: 1
RESPIRATORY NEGATIVE: 1
NEUROLOGICAL NEGATIVE: 1

## 2018-05-13 ASSESSMENT — ACTIVITIES OF DAILY LIVING (ADL): TOILETING: UNABLE TO DETERMINE AT THIS TIME

## 2018-05-13 ASSESSMENT — GAIT ASSESSMENTS: GAIT LEVEL OF ASSIST: UNABLE TO PARTICIPATE

## 2018-05-13 NOTE — PROGRESS NOTES
No new c/o  Pain OK, no numbness  +DF/PF  AVSS  XRs OK    Plan:    Mobilize  WBAT RUE, TTWB RLE  f/u JONATHAN 10 days
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
## 2018-05-13 NOTE — CARE PLAN
Problem: Safety  Goal: Will remain free from falls  Outcome: PROGRESSING AS EXPECTED  Treaded socks in place, bed in the lowest position, call light and belongings within reach, pt call for assistance appropriately    Problem: Pain Management  Goal: Pain level will decrease to patient's comfort goal  Outcome: PROGRESSING AS EXPECTED  Administered pain medications per MAR routinely q 3 hrs.

## 2018-05-13 NOTE — PROGRESS NOTES
Observed change in pt's condition, RRT (rounding) consult was called to assess pt.  Rapid nurse advised to hold off on dilaudid medication as much as possible and to remove humidification on O2 mask.  Team also advised to have pt remain awake as much as possible.

## 2018-05-13 NOTE — PROGRESS NOTES
Pt is AOX4, reports pain and medicated per MAR.  Rhonchi was auscultated upon assessment.  VSS and neuro checks WDL.  Pt refused skin assessment on back and buttocks due to pain, education was provided.
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
## 2018-05-13 NOTE — PROGRESS NOTES
RN got a call from Mery, who works at the [facility], where the pt resides. She was able to give a med list. This was updated in Med Rec. RN also called Pharmacist Courtney and let her know the updates. Will continue to monitor.

## 2018-05-13 NOTE — THERAPY
"Occupational Therapy Evaluation completed.   Functional Status:  Max A x2 supine to sit.  Pt remained lethargic even sitting EOB and unable to stay awake or maintain balance.  Pt appears to have limited RUE ROM 2' to pain.  Pt requires max/total A with self-care 2' to pain and lethargy.  Pt stood with max A x2, unable to maintain WB precautions.  Mod A x2 to return to supine.  Plan of Care: Will benefit from Occupational Therapy 4 times per week  Discharge Recommendations:  Equipment: Will Continue to Assess for Equipment Needs. Pt will likely require further therapy at a post acute facility prior to DC home.    See \"Rehab Therapy-Acute\" Patient Summary Report for complete documentation.    "
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
## 2018-05-14 PROBLEM — D64.9 ANEMIA: Status: ACTIVE | Noted: 2018-05-14

## 2018-05-14 LAB
ALBUMIN SERPL BCP-MCNC: 3 G/DL (ref 3.2–4.9)
ALBUMIN/GLOB SERPL: 1.1 G/DL
ALP SERPL-CCNC: 32 U/L (ref 30–99)
ALT SERPL-CCNC: 15 U/L (ref 2–50)
ANION GAP SERPL CALC-SCNC: 4 MMOL/L (ref 0–11.9)
AST SERPL-CCNC: 40 U/L (ref 12–45)
BASOPHILS # BLD AUTO: 0.3 % (ref 0–1.8)
BASOPHILS # BLD: 0.02 K/UL (ref 0–0.12)
BILIRUB SERPL-MCNC: 0.5 MG/DL (ref 0.1–1.5)
BUN SERPL-MCNC: 10 MG/DL (ref 8–22)
CALCIUM SERPL-MCNC: 7.7 MG/DL (ref 8.5–10.5)
CHLORIDE SERPL-SCNC: 103 MMOL/L (ref 96–112)
CO2 SERPL-SCNC: 26 MMOL/L (ref 20–33)
CREAT SERPL-MCNC: 0.62 MG/DL (ref 0.5–1.4)
EOSINOPHIL # BLD AUTO: 0.42 K/UL (ref 0–0.51)
EOSINOPHIL NFR BLD: 6.1 % (ref 0–6.9)
ERYTHROCYTE [DISTWIDTH] IN BLOOD BY AUTOMATED COUNT: 46 FL (ref 35.9–50)
GLOBULIN SER CALC-MCNC: 2.8 G/DL (ref 1.9–3.5)
GLUCOSE BLD-MCNC: 107 MG/DL (ref 65–99)
GLUCOSE BLD-MCNC: 117 MG/DL (ref 65–99)
GLUCOSE BLD-MCNC: 96 MG/DL (ref 65–99)
GLUCOSE SERPL-MCNC: 98 MG/DL (ref 65–99)
HCT VFR BLD AUTO: 22.4 % (ref 42–52)
HGB BLD-MCNC: 7.8 G/DL (ref 14–18)
IMM GRANULOCYTES # BLD AUTO: 0.04 K/UL (ref 0–0.11)
IMM GRANULOCYTES NFR BLD AUTO: 0.6 % (ref 0–0.9)
IRON SATN MFR SERPL: 7 % (ref 15–55)
IRON SERPL-MCNC: 22 UG/DL (ref 50–180)
LYMPHOCYTES # BLD AUTO: 1.27 K/UL (ref 1–4.8)
LYMPHOCYTES NFR BLD: 18.6 % (ref 22–41)
MAGNESIUM SERPL-MCNC: 1.9 MG/DL (ref 1.5–2.5)
MCH RBC QN AUTO: 31.3 PG (ref 27–33)
MCHC RBC AUTO-ENTMCNC: 34.8 G/DL (ref 33.7–35.3)
MCV RBC AUTO: 90 FL (ref 81.4–97.8)
MONOCYTES # BLD AUTO: 1.03 K/UL (ref 0–0.85)
MONOCYTES NFR BLD AUTO: 15.1 % (ref 0–13.4)
NEUTROPHILS # BLD AUTO: 4.05 K/UL (ref 1.82–7.42)
NEUTROPHILS NFR BLD: 59.3 % (ref 44–72)
NRBC # BLD AUTO: 0 K/UL
NRBC BLD-RTO: 0 /100 WBC
PHOSPHATE SERPL-MCNC: 3 MG/DL (ref 2.5–4.5)
PLATELET # BLD AUTO: 121 K/UL (ref 164–446)
PMV BLD AUTO: 9.9 FL (ref 9–12.9)
POTASSIUM SERPL-SCNC: 4.1 MMOL/L (ref 3.6–5.5)
PROT SERPL-MCNC: 5.8 G/DL (ref 6–8.2)
RBC # BLD AUTO: 2.49 M/UL (ref 4.7–6.1)
SODIUM SERPL-SCNC: 133 MMOL/L (ref 135–145)
TIBC SERPL-MCNC: 308 UG/DL (ref 250–450)
WBC # BLD AUTO: 6.8 K/UL (ref 4.8–10.8)

## 2018-05-14 PROCEDURE — 700102 HCHG RX REV CODE 250 W/ 637 OVERRIDE(OP): Performed by: SURGERY

## 2018-05-14 PROCEDURE — 83540 ASSAY OF IRON: CPT

## 2018-05-14 PROCEDURE — 700111 HCHG RX REV CODE 636 W/ 250 OVERRIDE (IP): Performed by: NURSE PRACTITIONER

## 2018-05-14 PROCEDURE — 85025 COMPLETE CBC W/AUTO DIFF WBC: CPT

## 2018-05-14 PROCEDURE — A9270 NON-COVERED ITEM OR SERVICE: HCPCS | Performed by: SURGERY

## 2018-05-14 PROCEDURE — 700112 HCHG RX REV CODE 229: Performed by: SURGERY

## 2018-05-14 PROCEDURE — 97110 THERAPEUTIC EXERCISES: CPT

## 2018-05-14 PROCEDURE — 84100 ASSAY OF PHOSPHORUS: CPT

## 2018-05-14 PROCEDURE — 82962 GLUCOSE BLOOD TEST: CPT | Mod: 91

## 2018-05-14 PROCEDURE — A9270 NON-COVERED ITEM OR SERVICE: HCPCS | Performed by: NURSE PRACTITIONER

## 2018-05-14 PROCEDURE — 36415 COLL VENOUS BLD VENIPUNCTURE: CPT

## 2018-05-14 PROCEDURE — 700111 HCHG RX REV CODE 636 W/ 250 OVERRIDE (IP): Performed by: SURGERY

## 2018-05-14 PROCEDURE — 770006 HCHG ROOM/CARE - MED/SURG/GYN SEMI*

## 2018-05-14 PROCEDURE — 97535 SELF CARE MNGMENT TRAINING: CPT

## 2018-05-14 PROCEDURE — 83735 ASSAY OF MAGNESIUM: CPT

## 2018-05-14 PROCEDURE — 80053 COMPREHEN METABOLIC PANEL: CPT

## 2018-05-14 PROCEDURE — 700102 HCHG RX REV CODE 250 W/ 637 OVERRIDE(OP): Performed by: NURSE PRACTITIONER

## 2018-05-14 PROCEDURE — 700105 HCHG RX REV CODE 258: Performed by: SURGERY

## 2018-05-14 PROCEDURE — 83550 IRON BINDING TEST: CPT

## 2018-05-14 PROCEDURE — 97530 THERAPEUTIC ACTIVITIES: CPT

## 2018-05-14 RX ADMIN — METFORMIN HYDROCHLORIDE 500 MG: 500 TABLET, FILM COATED ORAL at 07:58

## 2018-05-14 RX ADMIN — DIVALPROEX SODIUM 1000 MG: 500 TABLET, EXTENDED RELEASE ORAL at 07:58

## 2018-05-14 RX ADMIN — IRON SUCROSE 200 MG: 20 INJECTION, SOLUTION INTRAVENOUS at 17:23

## 2018-05-14 RX ADMIN — FAMOTIDINE 20 MG: 20 TABLET ORAL at 20:29

## 2018-05-14 RX ADMIN — DIVALPROEX SODIUM 1000 MG: 500 TABLET, EXTENDED RELEASE ORAL at 20:28

## 2018-05-14 RX ADMIN — ENOXAPARIN SODIUM 30 MG: 100 INJECTION SUBCUTANEOUS at 07:58

## 2018-05-14 RX ADMIN — POLYETHYLENE GLYCOL 3350 1 PACKET: 17 POWDER, FOR SOLUTION ORAL at 20:30

## 2018-05-14 RX ADMIN — STANDARDIZED SENNA CONCENTRATE AND DOCUSATE SODIUM 1 TABLET: 8.6; 5 TABLET, FILM COATED ORAL at 20:28

## 2018-05-14 RX ADMIN — ACETAMINOPHEN 650 MG: 325 TABLET, FILM COATED ORAL at 17:22

## 2018-05-14 RX ADMIN — ACETAMINOPHEN 650 MG: 325 TABLET, FILM COATED ORAL at 11:38

## 2018-05-14 RX ADMIN — OLANZAPINE 20 MG: 5 TABLET, FILM COATED ORAL at 20:28

## 2018-05-14 RX ADMIN — SODIUM CHLORIDE, POTASSIUM CHLORIDE, SODIUM LACTATE AND CALCIUM CHLORIDE: 600; 310; 30; 20 INJECTION, SOLUTION INTRAVENOUS at 03:37

## 2018-05-14 RX ADMIN — OLANZAPINE 20 MG: 5 TABLET, FILM COATED ORAL at 07:58

## 2018-05-14 RX ADMIN — DOCUSATE SODIUM 100 MG: 100 CAPSULE ORAL at 20:27

## 2018-05-14 RX ADMIN — BENZTROPINE MESYLATE 3 MG: 2 TABLET ORAL at 07:58

## 2018-05-14 RX ADMIN — DOCUSATE SODIUM 100 MG: 100 CAPSULE ORAL at 07:58

## 2018-05-14 RX ADMIN — ENOXAPARIN SODIUM 30 MG: 100 INJECTION SUBCUTANEOUS at 20:30

## 2018-05-14 RX ADMIN — FAMOTIDINE 20 MG: 20 TABLET ORAL at 07:58

## 2018-05-14 RX ADMIN — BENZTROPINE MESYLATE 3 MG: 2 TABLET ORAL at 20:29

## 2018-05-14 RX ADMIN — TAMSULOSIN HYDROCHLORIDE 0.4 MG: 0.4 CAPSULE ORAL at 07:58

## 2018-05-14 RX ADMIN — ACETAMINOPHEN 650 MG: 325 TABLET, FILM COATED ORAL at 05:47

## 2018-05-14 ASSESSMENT — COGNITIVE AND FUNCTIONAL STATUS - GENERAL
MOVING TO AND FROM BED TO CHAIR: UNABLE
PERSONAL GROOMING: A LITTLE
SUGGESTED CMS G CODE MODIFIER MOBILITY: CM
TURNING FROM BACK TO SIDE WHILE IN FLAT BAD: UNABLE
WALKING IN HOSPITAL ROOM: TOTAL
MOBILITY SCORE: 7
STANDING UP FROM CHAIR USING ARMS: A LOT
SUGGESTED CMS G CODE MODIFIER DAILY ACTIVITY: CK
CLIMB 3 TO 5 STEPS WITH RAILING: TOTAL
DRESSING REGULAR UPPER BODY CLOTHING: A LOT
MOVING FROM LYING ON BACK TO SITTING ON SIDE OF FLAT BED: UNABLE
DAILY ACTIVITIY SCORE: 14
EATING MEALS: A LITTLE
HELP NEEDED FOR BATHING: A LOT
TOILETING: A LOT
DRESSING REGULAR LOWER BODY CLOTHING: A LOT

## 2018-05-14 ASSESSMENT — PATIENT HEALTH QUESTIONNAIRE - PHQ9
2. FEELING DOWN, DEPRESSED, IRRITABLE, OR HOPELESS: NOT AT ALL
1. LITTLE INTEREST OR PLEASURE IN DOING THINGS: NOT AT ALL
SUM OF ALL RESPONSES TO PHQ9 QUESTIONS 1 AND 2: 0

## 2018-05-14 ASSESSMENT — ENCOUNTER SYMPTOMS
MYALGIAS: 1
CONSTITUTIONAL NEGATIVE: 1
ABDOMINAL PAIN: 0
CARDIOVASCULAR NEGATIVE: 1
GASTROINTESTINAL NEGATIVE: 1
RESPIRATORY NEGATIVE: 1
EYES NEGATIVE: 1
NEUROLOGICAL NEGATIVE: 1

## 2018-05-14 ASSESSMENT — PAIN SCALES - GENERAL
PAINLEVEL_OUTOF10: 1
PAINLEVEL_OUTOF10: 0

## 2018-05-14 ASSESSMENT — GAIT ASSESSMENTS: GAIT LEVEL OF ASSIST: UNABLE TO PARTICIPATE
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
## 2018-05-14 NOTE — THERAPY
"Physical Therapy Treatment completed.   Bed Mobility:  Supine to Sit: Moderate Assist  Transfers: Sit to Stand: Maximal Assist  Gait: Level Of Assist: Unable to Participate with Front-Wheel Walker       Plan of Care: Will benefit from Physical Therapy 4 times per week  Discharge Recommendations: Equipment: Will Continue to Assess for Equipment Needs.    See \"Rehab Therapy-Acute\" Patient Summary Report for complete documentation.     Pt presenting more alert from previous tx. Pt unable to recall injury or precautions. Pt was able to follow cues w/ manual initiation for mobility. In sitting pt leaning the R w/ very slow reaction times for correcting LOB. Pt needing R LE kicked out in front of him in order to maintain TTWB status for STS. Pt only able to perform from an elevated surface height however did show improved coordination during continued practice of STS. At end of tx pt again unable to recall injury or precautions. Question underlying cognitive deficits. As per initial eval, pt will benefit from post acute therapy.  "

## 2018-05-14 NOTE — PROGRESS NOTES
Pt much more alert and awake this morning. He is sitting up in bed with his glasses on, eating breakfast. Pt's oxygen is off and his heart rate is normal. Kitchen intact, IVF running at 100. Hourly rounding in place, call light within reach.

## 2018-05-14 NOTE — CARE PLAN
Problem: Safety  Goal: Will remain free from falls  Outcome: PROGRESSING AS EXPECTED  Pt educated to call for assistance if getting up. Bed alarm is on just in case    Problem: Pain Management  Goal: Pain level will decrease to patient's comfort goal  Outcome: PROGRESSING AS EXPECTED  Pts pain is being well managed, pt has been sleeping soundly
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
## 2018-05-15 ENCOUNTER — HOSPITAL ENCOUNTER (INPATIENT)
Facility: REHABILITATION | Age: 53
End: 2018-05-15
Attending: PHYSICAL MEDICINE & REHABILITATION | Admitting: PHYSICAL MEDICINE & REHABILITATION
Payer: MEDICARE

## 2018-05-15 LAB
BASOPHILS # BLD AUTO: 0.3 % (ref 0–1.8)
BASOPHILS # BLD: 0.02 K/UL (ref 0–0.12)
EOSINOPHIL # BLD AUTO: 0.54 K/UL (ref 0–0.51)
EOSINOPHIL NFR BLD: 9 % (ref 0–6.9)
ERYTHROCYTE [DISTWIDTH] IN BLOOD BY AUTOMATED COUNT: 44.3 FL (ref 35.9–50)
GLUCOSE BLD-MCNC: 101 MG/DL (ref 65–99)
GLUCOSE BLD-MCNC: 105 MG/DL (ref 65–99)
GLUCOSE BLD-MCNC: 106 MG/DL (ref 65–99)
GLUCOSE BLD-MCNC: 181 MG/DL (ref 65–99)
GLUCOSE BLD-MCNC: 185 MG/DL (ref 65–99)
HCT VFR BLD AUTO: 22.9 % (ref 42–52)
HGB BLD-MCNC: 8.1 G/DL (ref 14–18)
IMM GRANULOCYTES # BLD AUTO: 0.05 K/UL (ref 0–0.11)
IMM GRANULOCYTES NFR BLD AUTO: 0.8 % (ref 0–0.9)
LYMPHOCYTES # BLD AUTO: 1.11 K/UL (ref 1–4.8)
LYMPHOCYTES NFR BLD: 18.5 % (ref 22–41)
MCH RBC QN AUTO: 31.2 PG (ref 27–33)
MCHC RBC AUTO-ENTMCNC: 35.4 G/DL (ref 33.7–35.3)
MCV RBC AUTO: 88.1 FL (ref 81.4–97.8)
MONOCYTES # BLD AUTO: 1.12 K/UL (ref 0–0.85)
MONOCYTES NFR BLD AUTO: 18.7 % (ref 0–13.4)
NEUTROPHILS # BLD AUTO: 3.16 K/UL (ref 1.82–7.42)
NEUTROPHILS NFR BLD: 52.7 % (ref 44–72)
NRBC # BLD AUTO: 0 K/UL
NRBC BLD-RTO: 0 /100 WBC
PLATELET # BLD AUTO: 134 K/UL (ref 164–446)
PMV BLD AUTO: 10.2 FL (ref 9–12.9)
RBC # BLD AUTO: 2.6 M/UL (ref 4.7–6.1)
WBC # BLD AUTO: 6 K/UL (ref 4.8–10.8)

## 2018-05-15 PROCEDURE — 85025 COMPLETE CBC W/AUTO DIFF WBC: CPT

## 2018-05-15 PROCEDURE — 97110 THERAPEUTIC EXERCISES: CPT

## 2018-05-15 PROCEDURE — 36415 COLL VENOUS BLD VENIPUNCTURE: CPT

## 2018-05-15 PROCEDURE — A9270 NON-COVERED ITEM OR SERVICE: HCPCS | Performed by: SURGERY

## 2018-05-15 PROCEDURE — 700102 HCHG RX REV CODE 250 W/ 637 OVERRIDE(OP): Performed by: SURGERY

## 2018-05-15 PROCEDURE — 700111 HCHG RX REV CODE 636 W/ 250 OVERRIDE (IP): Performed by: SURGERY

## 2018-05-15 PROCEDURE — 97116 GAIT TRAINING THERAPY: CPT

## 2018-05-15 PROCEDURE — 700112 HCHG RX REV CODE 229: Performed by: SURGERY

## 2018-05-15 PROCEDURE — 82962 GLUCOSE BLOOD TEST: CPT | Mod: 91

## 2018-05-15 PROCEDURE — 770006 HCHG ROOM/CARE - MED/SURG/GYN SEMI*

## 2018-05-15 PROCEDURE — 700102 HCHG RX REV CODE 250 W/ 637 OVERRIDE(OP): Performed by: NURSE PRACTITIONER

## 2018-05-15 PROCEDURE — 97530 THERAPEUTIC ACTIVITIES: CPT

## 2018-05-15 PROCEDURE — A9270 NON-COVERED ITEM OR SERVICE: HCPCS | Performed by: NURSE PRACTITIONER

## 2018-05-15 PROCEDURE — 700111 HCHG RX REV CODE 636 W/ 250 OVERRIDE (IP): Performed by: NURSE PRACTITIONER

## 2018-05-15 RX ADMIN — OLANZAPINE 20 MG: 5 TABLET, FILM COATED ORAL at 08:20

## 2018-05-15 RX ADMIN — STANDARDIZED SENNA CONCENTRATE AND DOCUSATE SODIUM 1 TABLET: 8.6; 5 TABLET, FILM COATED ORAL at 20:12

## 2018-05-15 RX ADMIN — INSULIN HUMAN 2 UNITS: 100 INJECTION, SOLUTION PARENTERAL at 21:49

## 2018-05-15 RX ADMIN — ENOXAPARIN SODIUM 30 MG: 100 INJECTION SUBCUTANEOUS at 20:10

## 2018-05-15 RX ADMIN — POLYETHYLENE GLYCOL 3350 1 PACKET: 17 POWDER, FOR SOLUTION ORAL at 20:09

## 2018-05-15 RX ADMIN — DOCUSATE SODIUM 100 MG: 100 CAPSULE ORAL at 08:19

## 2018-05-15 RX ADMIN — IRON SUCROSE 200 MG: 20 INJECTION, SOLUTION INTRAVENOUS at 08:20

## 2018-05-15 RX ADMIN — FAMOTIDINE 20 MG: 20 TABLET ORAL at 08:19

## 2018-05-15 RX ADMIN — OLANZAPINE 20 MG: 5 TABLET, FILM COATED ORAL at 20:13

## 2018-05-15 RX ADMIN — ACETAMINOPHEN 650 MG: 325 TABLET, FILM COATED ORAL at 16:40

## 2018-05-15 RX ADMIN — DIVALPROEX SODIUM 1000 MG: 500 TABLET, EXTENDED RELEASE ORAL at 20:11

## 2018-05-15 RX ADMIN — DOCUSATE SODIUM 100 MG: 100 CAPSULE ORAL at 20:11

## 2018-05-15 RX ADMIN — METFORMIN HYDROCHLORIDE 500 MG: 500 TABLET, FILM COATED ORAL at 08:19

## 2018-05-15 RX ADMIN — ACETAMINOPHEN 650 MG: 325 TABLET, FILM COATED ORAL at 11:25

## 2018-05-15 RX ADMIN — FAMOTIDINE 20 MG: 20 TABLET ORAL at 20:12

## 2018-05-15 RX ADMIN — ACETAMINOPHEN 650 MG: 325 TABLET, FILM COATED ORAL at 01:05

## 2018-05-15 RX ADMIN — BENZTROPINE MESYLATE 3 MG: 2 TABLET ORAL at 08:19

## 2018-05-15 RX ADMIN — TAMSULOSIN HYDROCHLORIDE 0.4 MG: 0.4 CAPSULE ORAL at 08:19

## 2018-05-15 RX ADMIN — DIVALPROEX SODIUM 1000 MG: 500 TABLET, EXTENDED RELEASE ORAL at 08:19

## 2018-05-15 RX ADMIN — BENZTROPINE MESYLATE 3 MG: 2 TABLET ORAL at 20:13

## 2018-05-15 RX ADMIN — ENOXAPARIN SODIUM 30 MG: 100 INJECTION SUBCUTANEOUS at 08:19

## 2018-05-15 RX ADMIN — INSULIN HUMAN 2 UNITS: 100 INJECTION, SOLUTION PARENTERAL at 16:40

## 2018-05-15 ASSESSMENT — PAIN SCALES - GENERAL
PAINLEVEL_OUTOF10: 0
PAINLEVEL_OUTOF10: 0

## 2018-05-15 ASSESSMENT — COGNITIVE AND FUNCTIONAL STATUS - GENERAL
WALKING IN HOSPITAL ROOM: A LOT
MOBILITY SCORE: 8
CLIMB 3 TO 5 STEPS WITH RAILING: TOTAL
MOVING TO AND FROM BED TO CHAIR: UNABLE
SUGGESTED CMS G CODE MODIFIER MOBILITY: CM
MOVING FROM LYING ON BACK TO SITTING ON SIDE OF FLAT BED: UNABLE
STANDING UP FROM CHAIR USING ARMS: A LOT
TURNING FROM BACK TO SIDE WHILE IN FLAT BAD: UNABLE

## 2018-05-15 ASSESSMENT — ENCOUNTER SYMPTOMS
ROS GI COMMENTS: BM 5/14
PSYCHIATRIC NEGATIVE: 1
MYALGIAS: 1
CONSTITUTIONAL NEGATIVE: 1
RESPIRATORY NEGATIVE: 1
NEUROLOGICAL NEGATIVE: 1

## 2018-05-15 ASSESSMENT — PATIENT HEALTH QUESTIONNAIRE - PHQ9
SUM OF ALL RESPONSES TO PHQ9 QUESTIONS 1 AND 2: 0
2. FEELING DOWN, DEPRESSED, IRRITABLE, OR HOPELESS: NOT AT ALL
1. LITTLE INTEREST OR PLEASURE IN DOING THINGS: NOT AT ALL

## 2018-05-15 ASSESSMENT — GAIT ASSESSMENTS
DEVIATION: STEP TO;BRADYKINETIC
ASSISTIVE DEVICE: FRONT WHEEL WALKER
DISTANCE (FEET): 10
GAIT LEVEL OF ASSIST: MODERATE ASSIST

## 2018-05-15 NOTE — PROGRESS NOTES
Care assumed from IRIS Hui. Pt sitting up in chair this morning. Assisted x2. IV intact, boyer in place and draining clear yellow urine. Will check with trauma today about potential for discontinuation of boyer. Call light and bedside table within reach, hourly rounding in place.

## 2018-05-15 NOTE — CARE PLAN
Problem: Pain Management  Goal: Pain level will decrease to patient's comfort goal    Intervention: Follow pain managment plan developed in collaboration with patient and Interdisciplinary Team  Minimal complaints of pain , scheduled tylenol has been effective.
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
## 2018-05-15 NOTE — DISCHARGE PLANNING
Thank you for the opportunity to assist with this patient as they transition to post acute services.  We are aware of the Rehab referral from CAR[facility] Rosen.  Dr. Valdes to consult this referral. Our Transitional Case Coordinator will follow. At this time patient is showing to have Medicare as their coverage.   Please do not hesitate to call us if you require additional assistance my phone number is [phone] Lisbet
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
## 2018-05-15 NOTE — DISCHARGE PLANNING
Called  Services , was told that Mery is coordinator. Spoke to Mery . She state that pt resides in group home, was totally independent. Is followed by [facility] and Coordinator there is Aris GARCÍA .

## 2018-05-15 NOTE — PROGRESS NOTES
Assumed Care at 1900  VSS  No complaints of pain at this time   Call light within reach.  Patient resting comfortably    AxO 4   Room air   Patient had a bowel movement today 05/14/2018

## 2018-05-15 NOTE — CARE PLAN
Problem: Mobility  Goal: Risk for activity intolerance will decrease    Intervention: Assess and monitor signs of activity intolerance  Goal to be up for meals in the AM/ max assist / will attempt to pivot to the chair for breakfast
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
## 2018-05-16 LAB
BASOPHILS # BLD AUTO: 0.5 % (ref 0–1.8)
BASOPHILS # BLD: 0.03 K/UL (ref 0–0.12)
EOSINOPHIL # BLD AUTO: 0.69 K/UL (ref 0–0.51)
EOSINOPHIL NFR BLD: 11.6 % (ref 0–6.9)
ERYTHROCYTE [DISTWIDTH] IN BLOOD BY AUTOMATED COUNT: 45.2 FL (ref 35.9–50)
GLUCOSE BLD-MCNC: 101 MG/DL (ref 65–99)
GLUCOSE BLD-MCNC: 104 MG/DL (ref 65–99)
GLUCOSE BLD-MCNC: 114 MG/DL (ref 65–99)
HCT VFR BLD AUTO: 24.8 % (ref 42–52)
HGB BLD-MCNC: 8.3 G/DL (ref 14–18)
IMM GRANULOCYTES # BLD AUTO: 0.06 K/UL (ref 0–0.11)
IMM GRANULOCYTES NFR BLD AUTO: 1 % (ref 0–0.9)
LYMPHOCYTES # BLD AUTO: 1.41 K/UL (ref 1–4.8)
LYMPHOCYTES NFR BLD: 23.8 % (ref 22–41)
MCH RBC QN AUTO: 30.1 PG (ref 27–33)
MCHC RBC AUTO-ENTMCNC: 33.5 G/DL (ref 33.7–35.3)
MCV RBC AUTO: 89.9 FL (ref 81.4–97.8)
MONOCYTES # BLD AUTO: 1.07 K/UL (ref 0–0.85)
MONOCYTES NFR BLD AUTO: 18 % (ref 0–13.4)
NEUTROPHILS # BLD AUTO: 2.67 K/UL (ref 1.82–7.42)
NEUTROPHILS NFR BLD: 45.1 % (ref 44–72)
NRBC # BLD AUTO: 0 K/UL
NRBC BLD-RTO: 0 /100 WBC
PLATELET # BLD AUTO: 171 K/UL (ref 164–446)
PMV BLD AUTO: 10.1 FL (ref 9–12.9)
RBC # BLD AUTO: 2.76 M/UL (ref 4.7–6.1)
WBC # BLD AUTO: 5.9 K/UL (ref 4.8–10.8)

## 2018-05-16 PROCEDURE — 97530 THERAPEUTIC ACTIVITIES: CPT

## 2018-05-16 PROCEDURE — 97116 GAIT TRAINING THERAPY: CPT

## 2018-05-16 PROCEDURE — 700102 HCHG RX REV CODE 250 W/ 637 OVERRIDE(OP): Performed by: NURSE PRACTITIONER

## 2018-05-16 PROCEDURE — A9270 NON-COVERED ITEM OR SERVICE: HCPCS | Performed by: NURSE PRACTITIONER

## 2018-05-16 PROCEDURE — 700102 HCHG RX REV CODE 250 W/ 637 OVERRIDE(OP): Performed by: SURGERY

## 2018-05-16 PROCEDURE — 97535 SELF CARE MNGMENT TRAINING: CPT

## 2018-05-16 PROCEDURE — A9270 NON-COVERED ITEM OR SERVICE: HCPCS | Performed by: SURGERY

## 2018-05-16 PROCEDURE — 700111 HCHG RX REV CODE 636 W/ 250 OVERRIDE (IP): Performed by: NURSE PRACTITIONER

## 2018-05-16 PROCEDURE — 700111 HCHG RX REV CODE 636 W/ 250 OVERRIDE (IP): Performed by: SURGERY

## 2018-05-16 PROCEDURE — 700112 HCHG RX REV CODE 229: Performed by: SURGERY

## 2018-05-16 PROCEDURE — 85025 COMPLETE CBC W/AUTO DIFF WBC: CPT

## 2018-05-16 PROCEDURE — 82962 GLUCOSE BLOOD TEST: CPT

## 2018-05-16 PROCEDURE — 770006 HCHG ROOM/CARE - MED/SURG/GYN SEMI*

## 2018-05-16 PROCEDURE — 36415 COLL VENOUS BLD VENIPUNCTURE: CPT

## 2018-05-16 RX ORDER — HYDROMORPHONE HYDROCHLORIDE 2 MG/1
1 TABLET ORAL EVERY 4 HOURS PRN
Status: DISCONTINUED | OUTPATIENT
Start: 2018-05-16 | End: 2018-05-17 | Stop reason: HOSPADM

## 2018-05-16 RX ADMIN — ENOXAPARIN SODIUM 30 MG: 100 INJECTION SUBCUTANEOUS at 08:39

## 2018-05-16 RX ADMIN — OLANZAPINE 20 MG: 5 TABLET, FILM COATED ORAL at 20:44

## 2018-05-16 RX ADMIN — ACETAMINOPHEN 650 MG: 325 TABLET, FILM COATED ORAL at 04:27

## 2018-05-16 RX ADMIN — BENZTROPINE MESYLATE 3 MG: 2 TABLET ORAL at 20:44

## 2018-05-16 RX ADMIN — BENZTROPINE MESYLATE 3 MG: 2 TABLET ORAL at 08:38

## 2018-05-16 RX ADMIN — FAMOTIDINE 20 MG: 20 TABLET ORAL at 20:44

## 2018-05-16 RX ADMIN — TAMSULOSIN HYDROCHLORIDE 0.4 MG: 0.4 CAPSULE ORAL at 08:40

## 2018-05-16 RX ADMIN — METFORMIN HYDROCHLORIDE 500 MG: 500 TABLET, FILM COATED ORAL at 08:04

## 2018-05-16 RX ADMIN — DOCUSATE SODIUM 100 MG: 100 CAPSULE ORAL at 20:44

## 2018-05-16 RX ADMIN — OLANZAPINE 20 MG: 5 TABLET, FILM COATED ORAL at 08:41

## 2018-05-16 RX ADMIN — ENOXAPARIN SODIUM 30 MG: 100 INJECTION SUBCUTANEOUS at 20:44

## 2018-05-16 RX ADMIN — ACETAMINOPHEN 650 MG: 325 TABLET, FILM COATED ORAL at 16:27

## 2018-05-16 RX ADMIN — DIVALPROEX SODIUM 1000 MG: 500 TABLET, EXTENDED RELEASE ORAL at 20:44

## 2018-05-16 RX ADMIN — FAMOTIDINE 20 MG: 20 TABLET ORAL at 08:39

## 2018-05-16 RX ADMIN — DIVALPROEX SODIUM 1000 MG: 500 TABLET, EXTENDED RELEASE ORAL at 08:38

## 2018-05-16 RX ADMIN — STANDARDIZED SENNA CONCENTRATE AND DOCUSATE SODIUM 1 TABLET: 8.6; 5 TABLET, FILM COATED ORAL at 20:44

## 2018-05-16 RX ADMIN — IRON SUCROSE 200 MG: 20 INJECTION, SOLUTION INTRAVENOUS at 08:40

## 2018-05-16 RX ADMIN — DOCUSATE SODIUM 100 MG: 100 CAPSULE ORAL at 08:39

## 2018-05-16 ASSESSMENT — PAIN SCALES - GENERAL
PAINLEVEL_OUTOF10: 5
PAINLEVEL_OUTOF10: 0
PAINLEVEL_OUTOF10: 0

## 2018-05-16 ASSESSMENT — ENCOUNTER SYMPTOMS
RESPIRATORY NEGATIVE: 1
ROS GI COMMENTS: BM 5/14
MYALGIAS: 1
CONSTITUTIONAL NEGATIVE: 1
NEUROLOGICAL NEGATIVE: 1
PSYCHIATRIC NEGATIVE: 1

## 2018-05-16 ASSESSMENT — GAIT ASSESSMENTS
DISTANCE (FEET): 40
DEVIATION: STEP TO;BRADYKINETIC
GAIT LEVEL OF ASSIST: MINIMAL ASSIST
ASSISTIVE DEVICE: FRONT WHEEL WALKER

## 2018-05-16 ASSESSMENT — COGNITIVE AND FUNCTIONAL STATUS - GENERAL
EATING MEALS: A LITTLE
SUGGESTED CMS G CODE MODIFIER DAILY ACTIVITY: CK
HELP NEEDED FOR BATHING: A LOT
SUGGESTED CMS G CODE MODIFIER MOBILITY: CL
DRESSING REGULAR LOWER BODY CLOTHING: A LOT
STANDING UP FROM CHAIR USING ARMS: A LITTLE
TOILETING: A LITTLE
DRESSING REGULAR UPPER BODY CLOTHING: A LITTLE
MOVING TO AND FROM BED TO CHAIR: A LOT
DAILY ACTIVITIY SCORE: 17
WALKING IN HOSPITAL ROOM: A LOT
MOBILITY SCORE: 11
CLIMB 3 TO 5 STEPS WITH RAILING: TOTAL
TURNING FROM BACK TO SIDE WHILE IN FLAT BAD: A LOT
MOVING FROM LYING ON BACK TO SITTING ON SIDE OF FLAT BED: UNABLE

## 2018-05-16 NOTE — DISCHARGE PLANNING
Received voicemail from [facility], referral declined for cost of medications

## 2018-05-16 NOTE — PROGRESS NOTES
Assumed Care at 1900  VSS  No complaints of pain at this time   Call light within reach.  Patient resting comfortably    Room Air   Hourly rounding in place .  AXO4

## 2018-05-16 NOTE — DISCHARGE PLANNING
Spoke with Salome at [facility], referral has been accepted pending ability to DC to Group Home after SNF.

## 2018-05-16 NOTE — DISCHARGE PLANNING
Received notification from [facility], referral declined care exceeds capacity due to max assist x2 and no psych services available.

## 2018-05-16 NOTE — DISCHARGE PLANNING
Received voicemail from Bisi at [facility], referral declined due to cost of medications.

## 2018-05-16 NOTE — DISCHARGE PLANNING
Was able to speak with group home coordinator Mery, Tuesday afternoon. Per Mery, pt's bedroom is on the second floor of the home, and he will need to negotiate 15-20 steps to reach room. Mery tells me they do not provide any physical assist to clients. They do provide meal, laundry and med surveillance services. Per Physiatry Dr. Valdes review, anticipate skilled nursing for post acute care to allow more prolonged course of rehab prior to return home.  update to d/c planner Pat [phone].

## 2018-05-16 NOTE — THERAPY
"Occupational Therapy Treatment completed with focus on ADLs, ADL transfers and patient education.  Functional Status:  Pt seen for OT tx. Min A sit > stand, pt demonstrated improved OOB activity tolerance from previous session. Pt required cues to maintain TTWB through R LE. Once cued pt was able to maintain, once fatigued pt unable to maintain precautions. Pt demonstrated ability to complete standing tolerance for ADLs w/ CGA-min A for balance and safety to maintain precautions. Pt demonstrated ability to complete sit >stand from toilet w/ min A. Pt encouraged to amb to BR as tolerated w/ nrsg. Pt pleasant and cooperative. Pt motivated to regain strength, endurance and independence in ADLs and ADL transfers.   Plan of Care: Will benefit from Occupational Therapy 4 times per week  Discharge Recommendations:  Equipment Will Continue to Assess for Equipment Needs.     See \"Rehab Therapy-Acute\" Patient Summary Report for complete documentation.   "

## 2018-05-16 NOTE — DISCHARGE PLANNING
TCN met with patient at bedside to discuss the care teams recommendation for SNF. Patient is agreeable to suggestion and requested choice be faxed to all local facilities so he can talk to facilities representatives prior to making final choice. Choice faxed to [facility]. RNCM aware of conversation.

## 2018-05-16 NOTE — THERAPY
"Physical Therapy Treatment completed.   Bed Mobility:  Supine to Sit:  (NT up in chair)  Transfers: Sit to Stand: Minimal Assist  Gait: Level Of Assist: Minimal Assist with Front-Wheel Walker       Plan of Care: Will benefit from Physical Therapy 4 times per week  Discharge Recommendations: Equipment: Will Continue to Assess for Equipment Needs.     See \"Rehab Therapy-Acute\" Patient Summary Report for complete documentation.     Pt still progressing well w/ therapy. Pt still requires cues for maintaining TTWB especially w/ standing and when fatigued during ambulation. Pt is able to manage his R LE w/ greater ease and shows improved strength. Pt was able to increase ambulation distances however requires therapist intervention to cease ambulation as pt unable to recognize fatigue. With fatigue pt needed cues every step to maintain TTWB. Pt continues to be very motivated and will benefit from post acute therapy.  "
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
## 2018-05-16 NOTE — PROGRESS NOTES
Kitchen catheter discontinued per order. Patient tolerated well. Urinal given, will monitor for void.

## 2018-05-17 VITALS
WEIGHT: 198.41 LBS | RESPIRATION RATE: 18 BRPM | HEIGHT: 72 IN | DIASTOLIC BLOOD PRESSURE: 84 MMHG | OXYGEN SATURATION: 95 % | TEMPERATURE: 99.2 F | BODY MASS INDEX: 26.87 KG/M2 | HEART RATE: 96 BPM | SYSTOLIC BLOOD PRESSURE: 121 MMHG

## 2018-05-17 LAB
GLUCOSE BLD-MCNC: 106 MG/DL (ref 65–99)
GLUCOSE BLD-MCNC: 80 MG/DL (ref 65–99)
GLUCOSE BLD-MCNC: 91 MG/DL (ref 65–99)
MAGNESIUM SERPL-MCNC: 1.8 MG/DL (ref 1.5–2.5)
PHOSPHATE SERPL-MCNC: 3.9 MG/DL (ref 2.5–4.5)

## 2018-05-17 PROCEDURE — 700102 HCHG RX REV CODE 250 W/ 637 OVERRIDE(OP): Performed by: SURGERY

## 2018-05-17 PROCEDURE — A9270 NON-COVERED ITEM OR SERVICE: HCPCS | Performed by: SURGERY

## 2018-05-17 PROCEDURE — 700111 HCHG RX REV CODE 636 W/ 250 OVERRIDE (IP): Performed by: NURSE PRACTITIONER

## 2018-05-17 PROCEDURE — 700111 HCHG RX REV CODE 636 W/ 250 OVERRIDE (IP): Performed by: SURGERY

## 2018-05-17 PROCEDURE — 84100 ASSAY OF PHOSPHORUS: CPT

## 2018-05-17 PROCEDURE — 700112 HCHG RX REV CODE 229: Performed by: SURGERY

## 2018-05-17 PROCEDURE — 83735 ASSAY OF MAGNESIUM: CPT

## 2018-05-17 PROCEDURE — A9270 NON-COVERED ITEM OR SERVICE: HCPCS | Performed by: NURSE PRACTITIONER

## 2018-05-17 PROCEDURE — 82962 GLUCOSE BLOOD TEST: CPT | Mod: 91

## 2018-05-17 PROCEDURE — 700102 HCHG RX REV CODE 250 W/ 637 OVERRIDE(OP): Performed by: NURSE PRACTITIONER

## 2018-05-17 PROCEDURE — 36415 COLL VENOUS BLD VENIPUNCTURE: CPT

## 2018-05-17 RX ORDER — TAMSULOSIN HYDROCHLORIDE 0.4 MG/1
0.4 CAPSULE ORAL
Qty: 30 CAP | Refills: 0
Start: 2018-05-18 | End: 2018-08-09 | Stop reason: SDUPTHER

## 2018-05-17 RX ADMIN — DOCUSATE SODIUM 100 MG: 100 CAPSULE ORAL at 09:22

## 2018-05-17 RX ADMIN — TAMSULOSIN HYDROCHLORIDE 0.4 MG: 0.4 CAPSULE ORAL at 08:18

## 2018-05-17 RX ADMIN — FAMOTIDINE 20 MG: 20 TABLET ORAL at 09:22

## 2018-05-17 RX ADMIN — ENOXAPARIN SODIUM 30 MG: 100 INJECTION SUBCUTANEOUS at 09:22

## 2018-05-17 RX ADMIN — BENZTROPINE MESYLATE 3 MG: 2 TABLET ORAL at 09:21

## 2018-05-17 RX ADMIN — METFORMIN HYDROCHLORIDE 500 MG: 500 TABLET, FILM COATED ORAL at 08:18

## 2018-05-17 RX ADMIN — DIVALPROEX SODIUM 1000 MG: 500 TABLET, EXTENDED RELEASE ORAL at 09:21

## 2018-05-17 RX ADMIN — IRON SUCROSE 200 MG: 20 INJECTION, SOLUTION INTRAVENOUS at 09:22

## 2018-05-17 RX ADMIN — OLANZAPINE 20 MG: 5 TABLET, FILM COATED ORAL at 09:23

## 2018-05-17 ASSESSMENT — ENCOUNTER SYMPTOMS
ROS GI COMMENTS: BM 5/16
MYALGIAS: 1
NEUROLOGICAL NEGATIVE: 1
CONSTITUTIONAL NEGATIVE: 1
PSYCHIATRIC NEGATIVE: 1
RESPIRATORY NEGATIVE: 1

## 2018-05-17 NOTE — PROGRESS NOTES
Bedside report received from IRIS Krishna. Pt A&O X 4.  On room air oxygen. VSS.    Voiding via urinal. CMS +.     Pt calls appropriately. Pt sitting up in bed.   Plan of care discussed including pain management, mobilization, safety.   Hourly rounding in place.    Call light and belongings at bedside and within reach.    Bed in lowest position and locked.

## 2018-05-17 NOTE — CARE PLAN
Problem: Safety  Goal: Will remain free from injury  Outcome: PROGRESSING AS EXPECTED  Call light within reach, pt calls for assistance appropriately. Bed in low position and locked, upper bedside rails up, proper mobility signs placed, personal possessions within reach, treaded socks on. Hourly rounding in place.          Problem: Pain Management  Goal: Pain level will decrease to patient's comfort goal  Outcome: PROGRESSING AS EXPECTED  Patient complaining of no pain currently, educated pt on importance of reporting increased pain

## 2018-05-17 NOTE — DISCHARGE PLANNING
"Anticipated Discharge Disposition: SNF    Action:PASRR submitted by SUSANA, in \"sharron review.\" Notified [facility], they are aware and will accept pt. Request that we send PASRR when complete    Barriers to Discharge: none    Plan: Transfer to SNF at 1500. Bedside RN and charge RN aware.  "
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
## 2018-05-17 NOTE — DISCHARGE PLANNING
Transportation pushed back to 1500 per voicemail from [facility]. RN CM, Pat notified.

## 2018-05-17 NOTE — DISCHARGE PLANNING
Spoke with Livier at [facility]. Transportation set up for 1400 via [facility]. RN SARAHY, Shara notified. DC summary faxed
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
## 2018-05-18 NOTE — PROGRESS NOTES
Patient's IV discontinued and discharge instructions given. Report called to [facility]. Patient left with [facility] transport.

## 2018-06-06 ENCOUNTER — TELEPHONE (OUTPATIENT)
Dept: MEDICAL GROUP | Facility: MEDICAL CENTER | Age: 53
End: 2018-06-06

## 2018-06-06 RX ORDER — ACETAMINOPHEN 500 MG
500-1000 TABLET ORAL EVERY 8 HOURS PRN
Qty: 90 TAB | Refills: 1 | Status: SHIPPED
Start: 2018-06-06 | End: 2018-06-08 | Stop reason: SDUPTHER
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
## 2018-06-08 ENCOUNTER — TELEPHONE (OUTPATIENT)
Dept: MEDICAL GROUP | Facility: MEDICAL CENTER | Age: 53
End: 2018-06-08

## 2018-06-08 RX ORDER — ACETAMINOPHEN 500 MG
500-1000 TABLET ORAL EVERY 8 HOURS PRN
Qty: 90 TAB | Refills: 1 | Status: SHIPPED | OUTPATIENT
Start: 2018-06-08 | End: 2018-06-12 | Stop reason: SDUPTHER

## 2018-06-08 NOTE — TELEPHONE ENCOUNTER
Mery from [facility] is requesting another pain med be sent in to pharmacy. Patient is allergic to tylenol

## 2018-06-08 NOTE — TELEPHONE ENCOUNTER
Spoke with [facility] she is going to contact damián and the patient to verify what he is allergic to and let them know theres no other options. She will call back after verifying to let us know

## 2018-06-12 ENCOUNTER — OFFICE VISIT (OUTPATIENT)
Dept: MEDICAL GROUP | Facility: MEDICAL CENTER | Age: 53
End: 2018-06-12
Payer: MEDICARE

## 2018-06-12 VITALS
BODY MASS INDEX: 25.6 KG/M2 | OXYGEN SATURATION: 96 % | DIASTOLIC BLOOD PRESSURE: 70 MMHG | RESPIRATION RATE: 16 BRPM | TEMPERATURE: 97.2 F | HEART RATE: 86 BPM | SYSTOLIC BLOOD PRESSURE: 110 MMHG | WEIGHT: 189 LBS | HEIGHT: 72 IN

## 2018-06-12 DIAGNOSIS — E11.9 CONTROLLED TYPE 2 DIABETES MELLITUS WITHOUT COMPLICATION, WITHOUT LONG-TERM CURRENT USE OF INSULIN (HCC): ICD-10-CM

## 2018-06-12 DIAGNOSIS — S32.599S CLOSED FRACTURE OF MULTIPLE PUBIC RAMI, UNSPECIFIED LATERALITY, SEQUELA: ICD-10-CM

## 2018-06-12 DIAGNOSIS — J98.4 LUNG CALCIFICATION: ICD-10-CM

## 2018-06-12 DIAGNOSIS — G40.909 SEIZURE DISORDER (HCC): ICD-10-CM

## 2018-06-12 DIAGNOSIS — D50.9 IRON DEFICIENCY ANEMIA, UNSPECIFIED IRON DEFICIENCY ANEMIA TYPE: ICD-10-CM

## 2018-06-12 DIAGNOSIS — Z86.59 HISTORY OF MENTAL DISORDER: ICD-10-CM

## 2018-06-12 DIAGNOSIS — Z78.9 NO CONTRAINDICATION TO DEEP VEIN THROMBOSIS (DVT) PROPHYLAXIS: ICD-10-CM

## 2018-06-12 DIAGNOSIS — I10 HTN (HYPERTENSION), BENIGN: ICD-10-CM

## 2018-06-12 DIAGNOSIS — E03.4 HYPOTHYROIDISM DUE TO ACQUIRED ATROPHY OF THYROID: ICD-10-CM

## 2018-06-12 DIAGNOSIS — F25.9 SCHIZO AFFECTIVE SCHIZOPHRENIA (HCC): ICD-10-CM

## 2018-06-12 PROCEDURE — 99214 OFFICE O/P EST MOD 30 MIN: CPT | Performed by: NURSE PRACTITIONER

## 2018-06-12 RX ORDER — FERROUS SULFATE 325(65) MG
325 TABLET ORAL 3 TIMES DAILY
Status: SHIPPED | DISCHARGE
Start: 2018-06-12 | End: 2018-08-08 | Stop reason: SDUPTHER

## 2018-06-12 RX ORDER — DIVALPROEX SODIUM 500 MG/1
1000 TABLET, EXTENDED RELEASE ORAL 2 TIMES DAILY
Qty: 30 TAB | Refills: 5 | Status: SHIPPED | DISCHARGE
Start: 2018-06-12 | End: 2024-02-21

## 2018-06-12 RX ORDER — LEVOTHYROXINE SODIUM 0.05 MG/1
50 TABLET ORAL
Qty: 30 TAB | Status: SHIPPED | DISCHARGE
Start: 2018-06-12 | End: 2018-06-12

## 2018-06-12 RX ORDER — BENZTROPINE MESYLATE 1 MG/1
3 TABLET ORAL 2 TIMES DAILY
Qty: 60 TAB | Status: SHIPPED | DISCHARGE
Start: 2018-06-12 | End: 2020-07-30

## 2018-06-12 RX ORDER — ACETAMINOPHEN 500 MG
500-1000 TABLET ORAL EVERY 8 HOURS PRN
Qty: 90 TAB | Refills: 1 | Status: SHIPPED | OUTPATIENT
Start: 2018-06-12 | End: 2018-06-19

## 2018-06-12 RX ORDER — FAMOTIDINE 20 MG/1
20 TABLET, FILM COATED ORAL 2 TIMES DAILY
Qty: 60 TAB | Status: SHIPPED | DISCHARGE
Start: 2018-06-12 | End: 2018-08-08 | Stop reason: SDUPTHER

## 2018-06-12 ASSESSMENT — ENCOUNTER SYMPTOMS: MEMORY LOSS: 1
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
## 2018-06-16 ENCOUNTER — HOSPITAL ENCOUNTER (EMERGENCY)
Facility: MEDICAL CENTER | Age: 53
End: 2018-06-16
Attending: EMERGENCY MEDICINE
Payer: MEDICARE

## 2018-06-16 VITALS
OXYGEN SATURATION: 97 % | RESPIRATION RATE: 16 BRPM | SYSTOLIC BLOOD PRESSURE: 120 MMHG | TEMPERATURE: 97.7 F | HEART RATE: 78 BPM | DIASTOLIC BLOOD PRESSURE: 86 MMHG

## 2018-06-16 DIAGNOSIS — M25.511 CHRONIC RIGHT SHOULDER PAIN: ICD-10-CM

## 2018-06-16 DIAGNOSIS — M25.551 PAIN OF RIGHT HIP JOINT: ICD-10-CM

## 2018-06-16 DIAGNOSIS — G89.29 CHRONIC RIGHT SHOULDER PAIN: ICD-10-CM

## 2018-06-16 PROCEDURE — 700111 HCHG RX REV CODE 636 W/ 250 OVERRIDE (IP): Performed by: EMERGENCY MEDICINE

## 2018-06-16 PROCEDURE — 700102 HCHG RX REV CODE 250 W/ 637 OVERRIDE(OP): Performed by: EMERGENCY MEDICINE

## 2018-06-16 PROCEDURE — 99284 EMERGENCY DEPT VISIT MOD MDM: CPT

## 2018-06-16 PROCEDURE — A9270 NON-COVERED ITEM OR SERVICE: HCPCS | Performed by: EMERGENCY MEDICINE

## 2018-06-16 RX ORDER — OXYCODONE HYDROCHLORIDE AND ACETAMINOPHEN 5; 325 MG/1; MG/1
1 TABLET ORAL ONCE
Status: COMPLETED | OUTPATIENT
Start: 2018-06-16 | End: 2018-06-16

## 2018-06-16 RX ORDER — ONDANSETRON 4 MG/1
4 TABLET, ORALLY DISINTEGRATING ORAL ONCE
Status: COMPLETED | OUTPATIENT
Start: 2018-06-16 | End: 2018-06-16

## 2018-06-16 RX ADMIN — ONDANSETRON 4 MG: 4 TABLET, ORALLY DISINTEGRATING ORAL at 19:49

## 2018-06-16 RX ADMIN — OXYCODONE HYDROCHLORIDE AND ACETAMINOPHEN 1 TABLET: 5; 325 TABLET ORAL at 19:49

## 2018-06-17 ENCOUNTER — HOSPITAL ENCOUNTER (EMERGENCY)
Facility: MEDICAL CENTER | Age: 53
End: 2018-06-17
Attending: EMERGENCY MEDICINE
Payer: MEDICARE

## 2018-06-17 ENCOUNTER — TELEPHONE (OUTPATIENT)
Dept: MEDICAL GROUP | Facility: LAB | Age: 53
End: 2018-06-17

## 2018-06-17 VITALS
RESPIRATION RATE: 15 BRPM | SYSTOLIC BLOOD PRESSURE: 138 MMHG | HEART RATE: 97 BPM | DIASTOLIC BLOOD PRESSURE: 97 MMHG | WEIGHT: 189 LBS | TEMPERATURE: 97.5 F | BODY MASS INDEX: 25.6 KG/M2 | OXYGEN SATURATION: 98 % | HEIGHT: 72 IN

## 2018-06-17 DIAGNOSIS — T14.8XXA PAIN DUE TO FRACTURE: ICD-10-CM

## 2018-06-17 PROCEDURE — A9270 NON-COVERED ITEM OR SERVICE: HCPCS | Performed by: EMERGENCY MEDICINE

## 2018-06-17 PROCEDURE — 700102 HCHG RX REV CODE 250 W/ 637 OVERRIDE(OP): Performed by: EMERGENCY MEDICINE

## 2018-06-17 PROCEDURE — 99284 EMERGENCY DEPT VISIT MOD MDM: CPT

## 2018-06-17 RX ORDER — TRAMADOL HYDROCHLORIDE 50 MG/1
50 TABLET ORAL ONCE
Status: COMPLETED | OUTPATIENT
Start: 2018-06-17 | End: 2018-06-17

## 2018-06-17 RX ORDER — TRAMADOL HYDROCHLORIDE 50 MG/1
50 TABLET ORAL EVERY 4 HOURS PRN
Qty: 6 TAB | Refills: 0 | Status: SHIPPED | OUTPATIENT
Start: 2018-06-17 | End: 2018-06-19 | Stop reason: SDUPTHER

## 2018-06-17 RX ADMIN — TRAMADOL HYDROCHLORIDE 50 MG: 50 TABLET, COATED ORAL at 12:29

## 2018-06-17 ASSESSMENT — PAIN SCALES - GENERAL: PAINLEVEL_OUTOF10: 4

## 2018-06-17 ASSESSMENT — LIFESTYLE VARIABLES: SUBSTANCE_ABUSE: 0

## 2018-06-17 ASSESSMENT — ENCOUNTER SYMPTOMS: MYALGIAS: 1

## 2018-06-17 NOTE — ED NOTES
Spoke to staff at  [facility] @ [phone]. They are aware pt is medically cleared and they are able to take him back.

## 2018-06-17 NOTE — DISCHARGE PLANNING
Medical Social Work     SW received a call from the RN requesting assistance with transportation back to his group home. SW checked and the pt does not have MTM services and does not have anyone who can pick him up. JOE provided a cab voucher for the pt.

## 2018-06-17 NOTE — DISCHARGE PLANNING
RN reported that the pt has been cleared to d/c back to his GH, who is aware of his d/c. Pt does not have MTM benefits and  will not provide transportation. JOE arranged an [facility] ride. RN escorted pt to [facility] where he was transported via [employer]. No further SS needs at this time.
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
## 2018-06-17 NOTE — DISCHARGE INSTRUCTIONS
Chronic Pain, Adult  Chronic pain is a type of pain that lasts or keeps coming back (recurs) for at least six months. You may have chronic headaches, abdominal pain, or body pain. Chronic pain may be related to an illness, such as fibromyalgia or complex regional pain syndrome. Sometimes the cause of chronic pain is not known.  Chronic pain can make it hard for you to do daily activities. If not treated, chronic pain can lead to other health problems, including anxiety and depression. Treatment depends on the cause and severity of your pain. You may need to work with a pain specialist to come up with a treatment plan. The plan may include medicine, counseling, and physical therapy. Many people benefit from a combination of two or more types of treatment to control their pain.  Follow these instructions at home:  Lifestyle  · Consider keeping a pain diary to share with your health care providers.  · Consider talking with a mental health care provider (psychologist) about how to cope with chronic pain.  · Consider joining a chronic pain support group.  · Try to control or lower your stress levels. Talk to your health care provider about strategies to do this.  General instructions  · Take over-the-counter and prescription medicines only as told by your health care provider.  · Follow your treatment plan as told by your health care provider. This may include:  ¨ Gentle, regular exercise.  ¨ Eating a healthy diet that includes foods such as vegetables, fruits, fish, and lean meats.  ¨ Cognitive or behavioral therapy.  ¨ Working with a physical therapist.  ¨ Meditation or yoga.  ¨ Acupuncture or massage therapy.  ¨ Aroma, color, light, or sound therapy.  ¨ Local electrical stimulation.  ¨ Shots (injections) of numbing or pain-relieving medicines into the spine or the area of pain.  · Check your pain level as told by your health care provider. Ask your health care provider if you should use a pain scale.  · Learn as much  as you can about how to manage your chronic pain. Ask your health care provider if an intensive pain rehabilitation program or a chronic pain specialist would be helpful.  · Keep all follow-up visits as told by your health care provider. This is important.  Contact a health care provider if:  · Your pain gets worse.  · You have new pain.  · You have trouble sleeping.  · You have trouble doing your normal activities.  · Your pain is not controlled with treatment.  · Your have side effects from pain medicine.  · You feel weak.  Get help right away if:  · You lose feeling or have numbness in your body.  · You lose control of bowel or bladder function.  · Your pain suddenly gets much worse.  · You develop shaking or chills.  · You develop confusion.  · You develop chest pain.  · You have trouble breathing or shortness of breath.  · You pass out.  · You have thoughts about hurting yourself or others.  This information is not intended to replace advice given to you by your health care provider. Make sure you discuss any questions you have with your health care provider.  Document Released: 09/09/2003 Document Revised: 08/17/2017 Document Reviewed: 06/06/2017  [employer] Interactive Patient Education © 2017 [employer]
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
## 2018-06-18 ENCOUNTER — PATIENT OUTREACH (OUTPATIENT)
Dept: HEALTH INFORMATION MANAGEMENT | Facility: OTHER | Age: 53
End: 2018-06-18

## 2018-06-19 ENCOUNTER — OFFICE VISIT (OUTPATIENT)
Dept: MEDICAL GROUP | Facility: MEDICAL CENTER | Age: 53
End: 2018-06-19
Payer: MEDICARE

## 2018-06-19 VITALS
HEIGHT: 72 IN | RESPIRATION RATE: 16 BRPM | OXYGEN SATURATION: 96 % | DIASTOLIC BLOOD PRESSURE: 78 MMHG | TEMPERATURE: 98.6 F | HEART RATE: 114 BPM | SYSTOLIC BLOOD PRESSURE: 124 MMHG | WEIGHT: 191 LBS | BODY MASS INDEX: 25.87 KG/M2

## 2018-06-19 DIAGNOSIS — S32.599S CLOSED FRACTURE OF MULTIPLE PUBIC RAMI, UNSPECIFIED LATERALITY, SEQUELA: ICD-10-CM

## 2018-06-19 DIAGNOSIS — S42.191D CLOSED FRACTURE OF OTHER PART OF RIGHT SCAPULA WITH ROUTINE HEALING, SUBSEQUENT ENCOUNTER: ICD-10-CM

## 2018-06-19 PROCEDURE — 99213 OFFICE O/P EST LOW 20 MIN: CPT | Performed by: FAMILY MEDICINE

## 2018-06-19 RX ORDER — TRAMADOL HYDROCHLORIDE 50 MG/1
50 TABLET ORAL 2 TIMES DAILY PRN
Qty: 14 TAB | Refills: 0 | Status: SHIPPED | OUTPATIENT
Start: 2018-06-19 | End: 2018-07-12 | Stop reason: SDUPTHER

## 2018-06-19 RX ORDER — LEVOTHYROXINE SODIUM 0.05 MG/1
50 TABLET ORAL
COMMUNITY
End: 2018-09-12 | Stop reason: SDUPTHER
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
## 2018-06-22 ENCOUNTER — TELEPHONE (OUTPATIENT)
Dept: MEDICAL GROUP | Facility: MEDICAL CENTER | Age: 53
End: 2018-06-22

## 2018-06-22 RX ORDER — DOCUSATE SODIUM 100 MG/1
100 CAPSULE, LIQUID FILLED ORAL 2 TIMES DAILY
Qty: 60 CAP | Refills: 1 | Status: SHIPPED | OUTPATIENT
Start: 2018-06-22 | End: 2018-07-26 | Stop reason: SDUPTHER

## 2018-06-22 NOTE — TELEPHONE ENCOUNTER
1. Caller Name: Mery care giver special needs             Call Back Number: [phone]    2. Message: Patient needs a refill on a stool softener that was given by a previews doctor that is not longer in service and now he is completely out of it... rx is called Dok 100mg and he takes one in the morning and 1 at night.    3. Patient approves office to leave a detailed voicemail/MyChart message: N\A

## 2018-07-12 ENCOUNTER — OFFICE VISIT (OUTPATIENT)
Dept: MEDICAL GROUP | Facility: MEDICAL CENTER | Age: 53
End: 2018-07-12
Payer: MEDICARE

## 2018-07-12 VITALS
RESPIRATION RATE: 16 BRPM | OXYGEN SATURATION: 96 % | SYSTOLIC BLOOD PRESSURE: 114 MMHG | HEIGHT: 72 IN | HEART RATE: 85 BPM | TEMPERATURE: 98.2 F | WEIGHT: 185 LBS | DIASTOLIC BLOOD PRESSURE: 72 MMHG | BODY MASS INDEX: 25.06 KG/M2

## 2018-07-12 DIAGNOSIS — S32.599S CLOSED FRACTURE OF MULTIPLE PUBIC RAMI, UNSPECIFIED LATERALITY, SEQUELA: ICD-10-CM

## 2018-07-12 DIAGNOSIS — S42.191D CLOSED FRACTURE OF OTHER PART OF RIGHT SCAPULA WITH ROUTINE HEALING, SUBSEQUENT ENCOUNTER: ICD-10-CM

## 2018-07-12 DIAGNOSIS — Z79.891 CHRONIC PRESCRIPTION OPIATE USE: ICD-10-CM

## 2018-07-12 PROCEDURE — 99214 OFFICE O/P EST MOD 30 MIN: CPT | Performed by: INTERNAL MEDICINE

## 2018-07-12 RX ORDER — TRAMADOL HYDROCHLORIDE 50 MG/1
50 TABLET ORAL 2 TIMES DAILY PRN
Qty: 60 TAB | Refills: 1 | Status: SHIPPED | OUTPATIENT
Start: 2018-07-26 | End: 2018-08-15
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
## 2018-07-26 RX ORDER — DOCUSATE SODIUM 100 MG/1
CAPSULE, LIQUID FILLED ORAL
Qty: 60 CAP | Refills: 3 | Status: SHIPPED | OUTPATIENT
Start: 2018-07-26 | End: 2018-11-15 | Stop reason: SDUPTHER

## 2018-08-01 ENCOUNTER — OFFICE VISIT (OUTPATIENT)
Dept: MEDICAL GROUP | Facility: MEDICAL CENTER | Age: 53
End: 2018-08-01
Payer: MEDICARE

## 2018-08-01 VITALS
HEART RATE: 96 BPM | BODY MASS INDEX: 25.06 KG/M2 | OXYGEN SATURATION: 95 % | SYSTOLIC BLOOD PRESSURE: 118 MMHG | RESPIRATION RATE: 16 BRPM | TEMPERATURE: 99.3 F | DIASTOLIC BLOOD PRESSURE: 62 MMHG | HEIGHT: 72 IN | WEIGHT: 185 LBS

## 2018-08-01 DIAGNOSIS — R73.02 IGT (IMPAIRED GLUCOSE TOLERANCE): ICD-10-CM

## 2018-08-01 DIAGNOSIS — Z12.11 SCREEN FOR COLON CANCER: ICD-10-CM

## 2018-08-01 DIAGNOSIS — F25.9 SCHIZO AFFECTIVE SCHIZOPHRENIA (HCC): ICD-10-CM

## 2018-08-01 DIAGNOSIS — Z00.00 MEDICARE ANNUAL WELLNESS VISIT, SUBSEQUENT: ICD-10-CM

## 2018-08-01 DIAGNOSIS — S32.599S CLOSED FRACTURE OF MULTIPLE PUBIC RAMI, UNSPECIFIED LATERALITY, SEQUELA: ICD-10-CM

## 2018-08-01 DIAGNOSIS — I10 HTN (HYPERTENSION), BENIGN: ICD-10-CM

## 2018-08-01 DIAGNOSIS — B19.20 HEPATITIS C VIRUS INFECTION WITHOUT HEPATIC COMA, UNSPECIFIED CHRONICITY: ICD-10-CM

## 2018-08-01 DIAGNOSIS — Z79.891 CHRONIC PRESCRIPTION OPIATE USE: ICD-10-CM

## 2018-08-01 PROBLEM — E11.9 CONTROLLED TYPE 2 DIABETES MELLITUS WITHOUT COMPLICATION, WITHOUT LONG-TERM CURRENT USE OF INSULIN (HCC): Status: RESOLVED | Noted: 2018-05-12 | Resolved: 2018-08-01

## 2018-08-01 PROBLEM — Z86.59 HISTORY OF MENTAL DISORDER: Status: RESOLVED | Noted: 2018-05-11 | Resolved: 2018-08-01

## 2018-08-01 PROBLEM — T14.90XA TRAUMA: Status: RESOLVED | Noted: 2018-05-11 | Resolved: 2018-08-01

## 2018-08-01 PROBLEM — G40.909 SEIZURE DISORDER (HCC): Status: RESOLVED | Noted: 2017-01-17 | Resolved: 2018-08-01

## 2018-08-01 PROBLEM — Z78.9 NO CONTRAINDICATION TO DEEP VEIN THROMBOSIS (DVT) PROPHYLAXIS: Status: RESOLVED | Noted: 2018-05-11 | Resolved: 2018-08-01

## 2018-08-01 PROCEDURE — 99212 OFFICE O/P EST SF 10 MIN: CPT | Mod: 25 | Performed by: INTERNAL MEDICINE

## 2018-08-01 PROCEDURE — G0439 PPPS, SUBSEQ VISIT: HCPCS | Performed by: INTERNAL MEDICINE

## 2018-08-01 RX ORDER — DIVALPROEX SODIUM 500 MG/1
TABLET, DELAYED RELEASE ORAL
Refills: 0 | COMMUNITY
Start: 2018-07-07 | End: 2018-08-15

## 2018-08-01 ASSESSMENT — ACTIVITIES OF DAILY LIVING (ADL): BATHING_REQUIRES_ASSISTANCE: 0

## 2018-08-01 ASSESSMENT — PATIENT HEALTH QUESTIONNAIRE - PHQ9: CLINICAL INTERPRETATION OF PHQ2 SCORE: 0

## 2018-08-01 ASSESSMENT — ENCOUNTER SYMPTOMS: GENERAL WELL-BEING: FAIR

## 2018-08-01 NOTE — PROGRESS NOTES
CC: Follow-up pain due for wellness examination.    HPI:   Haile presents today with the following.      1. Medicare annual wellness visit, subsequent  Screenings performed below all ADLs not taking care by patient or taking care by his group home.    2. Chronic prescription opiate use  Maintain on pain medications was given a prescription a week ago.  Reports that his pain has improved from initially being discharged from the nursing home.  Discussion about the mid administration in his facility is not giving him pain medications when needed.  He reports he is only try to take 1 per day has not gone over 2 as prescribed.  He is not yet discussed with the supervisor and I am not heard from the facility about him abusing medications.      Information for advance directives given to patient or instructed to bring in advance directives into to office to put in chart.      Depression Screening    Little interest or pleasure in doing things?  0 - not at all  Feeling down, depressed , or hopeless? 0 - not at all  Patient Health Questionnaire Score: 0     If depressive symptoms identified deferred to follow up visit unless specifically addressed in assessment and plan.    Interpretation of PHQ-9 Total Score   Score Severity   1-4 No Depression   5-9 Mild Depression   10-14 Moderate Depression   15-19 Moderately Severe Depression   20-27 Severe Depression    Screening for Cognitive Impairment    Three Minute Recall (leader, season, table) 2/3    Edwin clock face with all 12 numbers and set the hands to show 10 past 11.  No 1/5  Cognitive concerns identified deferred for follow up unless specifically addressed in assessment and plan.    Fall Risk Assessment    Has the patient had two or more falls in the last year or any fall with injury in the last year?  Yes    Safety Assessment    Throw rugs on floor.  No  Handrails on all stairs.  Yes  Good lighting in all hallways.  Yes  Difficulty hearing.  No  Patient counseled about  all safety risks that were identified.    Functional Assessment ADLs    Are there any barriers preventing you from cooking for yourself or meeting nutritional needs?  No.    Are there any barriers preventing you from driving safely or obtaining transportation?  Yes.    Are there any barriers preventing you from using a telephone or calling for help?  No.    Are there any barriers preventing you from shopping?  No.    Are there any barriers preventing you from taking care of your own finances?  No.    Are there any barriers preventing you from managing your medications?  Yes.    Are there any barriers preventing you from showering, bathing or dressing yourself?  No.    Are you currently engaging in any exercise or physical activity?  Yes.     What is your perception of your health?  Fair.      Health Maintenance Summary                IMM ZOSTER VACCINES Overdue 1/19/2013     URINE DRUG SCREEN Overdue 9/14/2017      Done 9/19/2016 URINE DRUG SCREEN     Patient has more history with this topic...    COLON CANCER SCREENING ANNUAL FIT Overdue 3/13/2018      Done 3/13/2017 OCCULT BLOOD FECES IMMUNOASSAY     Patient has more history with this topic...    Annual Wellness Visit Overdue 3/23/2018      Done 3/22/2017 Visit Dx: Medicare annual wellness visit, subsequent     Patient has more history with this topic...    IMM INFLUENZA Next Due 9/1/2018      Done 10/19/2017 Ext Imm: Influenza Quad Inj P     Patient has more history with this topic...    IMM DTaP/Tdap/Td Vaccine Next Due 5/24/2022      Done 5/24/2012 Imm Admin: Tdap Vaccine          Patient Care Team:  Librado Colin M.D. as PCP - General (Internal Medicine)  Librado Morfin, Ph.D. (Inactive) as Consulting Physician (Psychology)  Jack Temple P.A.-C. as Consulting Physician (Gastroenterology)            Health Care Screening: Age-appropriate preventive services that Medicare covers were discussed today and ordered as indicated and agreed upon by the patient,  and as recommended by USPTF and ACIP.     Patient Active Problem List    Diagnosis Date Noted   • Anemia 05/14/2018     Priority: High   • Closed fracture of multiple pubic rami (HCC) 05/11/2018     Priority: High   • Schizo affective schizophrenia (HCC) 04/22/2010     Priority: High   • Fracture of right scapula 05/11/2018     Priority: Medium   • Hepatitis C 05/24/2010     Priority: Medium   • HTN (hypertension), benign 04/22/2010     Priority: Medium   • Lung calcification 05/11/2018     Priority: Low   • GERD (gastroesophageal reflux disease) 07/14/2013     Priority: Low   • Chronic prescription opiate use 07/12/2018   • IGT (impaired glucose tolerance) 03/21/2017   • Encounter for long-term (current) use of other medications 01/29/2015       Current Outpatient Prescriptions   Medication Sig Dispense Refill   • divalproex (DEPAKOTE) 500 MG Tablet Delayed Response TAKE 2 TABLETS  1000MG  BY MOUTH TWICE A DAY  0   •  MG Cap TAKE 1 CAPSULE BY MOUTH TWICE A DAY 60 Cap 3   • tramadol (ULTRAM) 50 MG Tab Take 1 Tab by mouth 2 times a day as needed for Severe Pain for up to 30 days. 60 Tab 1   • levothyroxine (SYNTHROID) 50 MCG Tab Take 50 mcg by mouth Every morning on an empty stomach.     • Ascorbic Acid (VITAMIN C PO) Take  by mouth.     • benztropine (COGENTIN) 1 MG Tab Take 3 Tabs by mouth 2 times a day. 60 Tab    • divalproex ER (DEPAKOTE ER) 500 MG TABLET SR 24 HR Take 2 Tabs by mouth 2 Times a Day. 30 Tab 5   • famotidine (PEPCID) 20 MG Tab Take 1 Tab by mouth 2 times a day. 60 Tab    • ferrous sulfate 325 (65 Fe) MG tablet Take 1 Tab by mouth 3 times a day.     • tamsulosin (FLOMAX) 0.4 MG capsule Take 1 Cap by mouth ONE-HALF HOUR AFTER BREAKFAST. 30 Cap 0   • Paliperidone Palmitate (INVEGA SUSTENNA IM) 1 Dose by Intramuscular route every 28 days. Next dose due 5/15/18 @@ [facility]     • olanzapine (ZYPREXA) 20 MG tablet 2 Times a Day.  5   • metFORMIN (GLUCOPHAGE) 500 MG Tab TAKE 1 TABLET BY MOUTH ONCE  DAILY 30 Tab 9     No current facility-administered medications for this visit.        Family History   Problem Relation Age of Onset   • Genetic Neg Hx        Social History     Social History   • Marital status: Unknown     Spouse name: N/A   • Number of children: N/A   • Years of education: N/A     Occupational History   • Not on file.     Social History Main Topics   • Smoking status: Current Every Day Smoker     Packs/day: 0.25     Years: 30.00     Types: Cigarettes   • Smokeless tobacco: Never Used      Comment: 3/4 ppd x 30 years   • Alcohol use No   • Drug use: No   • Sexual activity: Yes     Partners: Female     Other Topics Concern   • Not on file     Social History Narrative    ** Merged History Encounter **            Past Surgical History:   Procedure Laterality Date   • GASTROSCOPY WITH BIOPSY  11/7/2010    Performed by MARY SAENZ at ENDOSCOPY [facility]   • GASTROSCOPY WITH BIOPSY  9/3/2010    Performed by DIONNE KELLEY at ENDOSCOPY [facility]   • OTHER ORTHOPEDIC SURGERY  2000    left ankle repair, total fusion.   • OTHER ABDOMINAL SURGERY  1982    hernia repair       Allergies as of 08/01/2018 - Reviewed 08/01/2018   Allergen Reaction Noted   • Hctz  05/24/2012   • Nsaids  05/11/2018   • Other drug Nausea 04/22/2010   • Vicodin [hydrocodone-acetaminophen] Vomiting 11/12/2007   • Vicodin [hydrocodone-acetaminophen]  05/11/2018   • Amoxicillin Nausea 04/22/2010        ROS: Denies Chest pain, SOB, LE edema.    /62   Pulse 96   Temp 37.4 °C (99.3 °F)   Resp 16   Ht 1.829 m (6')   Wt 83.9 kg (185 lb)   SpO2 95%   BMI 25.09 kg/m²     Physical Exam:  Gen:         Alert and oriented, No apparent distress.  Neck:        No Lymphadenopathy or Bruits.  Lungs:     Clear to auscultation bilaterally  CV:          Regular rate and rhythm. No murmurs, rubs or gallops.  Abd:         Soft non tender, non distended. Normal active bowel sounds.  No  Hepatosplenomegaly, No pulsatile  masses.                   Ext:          No clubbing, cyanosis, edema.      Assessment and Plan.   53 y.o. male with the following issues.    1. Medicare annual wellness visit, subsequent  Discussed healthy lifestyle habits as well as screening regimens.    2. Chronic prescription opiate use  Have instructed him to talk to the supervisor of the facility regarding his medications.  Certainly advised him to call me if there is any concerns about meds.  He certainly understandably still having pain and he is allowed to take them 2 times per day.    3. Closed fracture of multiple pubic rami, unspecified laterality, sequela  Again status post fractures improving both shoulder and hip continue current therapy    4. Schizo affective schizophrenia (HCC)  Follow along with psychiatry    5. HTN (hypertension), benign  Currently well controlled, Discuss diet, exercise and salt restriction.  No change to medication therapy.    6. Hepatitis C virus infection without hepatic coma, unspecified chronicity  Status post treatment no sequelae    7. IGT (impaired glucose tolerance)  Blood work pending before next visit    8. Screen for colon cancer    - OCCULT BLOOD FECES IMMUNOASSAY; Future        Referrals offered: Community-based lifestyle interventions to reduce health risks and promote self-management and wellness, fall prevention, nutrition, physical activity, tobacco-use cessation, weight loss, and mental health services as per orders if indicated.    Discussion today about general wellness and lifestyle habits:    · Prevent falls and reduce trip hazards; Cautioned about securing or removing rugs.  · Have a working fire alarm and carbon monoxide detector;   · Engage in regular physical activity and social activities

## 2018-08-08 RX ORDER — FERROUS SULFATE 325(65) MG
325 TABLET ORAL 3 TIMES DAILY
Qty: 30 TAB | Refills: 6 | Status: SHIPPED | OUTPATIENT
Start: 2018-08-08 | End: 2018-09-20 | Stop reason: SDUPTHER

## 2018-08-08 RX ORDER — FAMOTIDINE 20 MG/1
20 TABLET, FILM COATED ORAL 2 TIMES DAILY
Qty: 60 TAB | Refills: 6 | Status: SHIPPED | OUTPATIENT
Start: 2018-08-08 | End: 2019-02-05 | Stop reason: SDUPTHER

## 2018-08-08 RX ORDER — MULTIVIT-MIN/IRON/FOLIC ACID/K 18-600-40
CAPSULE ORAL
Qty: 60 CAP | OUTPATIENT
Start: 2018-08-08

## 2018-08-09 RX ORDER — TAMSULOSIN HYDROCHLORIDE 0.4 MG/1
0.4 CAPSULE ORAL
Qty: 90 CAP | Refills: 3 | Status: SHIPPED | OUTPATIENT
Start: 2018-08-09 | End: 2019-06-25 | Stop reason: SDUPTHER

## 2018-08-15 ENCOUNTER — OFFICE VISIT (OUTPATIENT)
Dept: NEUROLOGY | Facility: MEDICAL CENTER | Age: 53
End: 2018-08-15
Payer: MEDICARE

## 2018-08-15 VITALS
SYSTOLIC BLOOD PRESSURE: 116 MMHG | TEMPERATURE: 97 F | RESPIRATION RATE: 16 BRPM | OXYGEN SATURATION: 96 % | WEIGHT: 187.39 LBS | BODY MASS INDEX: 25.38 KG/M2 | HEART RATE: 79 BPM | HEIGHT: 72 IN | DIASTOLIC BLOOD PRESSURE: 74 MMHG

## 2018-08-15 DIAGNOSIS — G40.909 SEIZURE DISORDER (HCC): ICD-10-CM

## 2018-08-15 DIAGNOSIS — F25.9 SCHIZO AFFECTIVE SCHIZOPHRENIA (HCC): ICD-10-CM

## 2018-08-15 PROCEDURE — 99213 OFFICE O/P EST LOW 20 MIN: CPT | Performed by: NURSE PRACTITIONER

## 2018-08-15 ASSESSMENT — ENCOUNTER SYMPTOMS
DIARRHEA: 0
NAUSEA: 0
DOUBLE VISION: 0
ABDOMINAL PAIN: 0
NERVOUS/ANXIOUS: 0
COUGH: 0
SORE THROAT: 0
DEPRESSION: 1
HEADACHES: 0
MUSCULOSKELETAL NEGATIVE: 1
SEIZURES: 0
MEMORY LOSS: 1
VOMITING: 0
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
## 2018-08-17 ENCOUNTER — HOSPITAL ENCOUNTER (OUTPATIENT)
Dept: LAB | Facility: MEDICAL CENTER | Age: 53
End: 2018-08-17
Attending: NURSE PRACTITIONER
Payer: MEDICARE

## 2018-08-17 DIAGNOSIS — D50.9 IRON DEFICIENCY ANEMIA, UNSPECIFIED IRON DEFICIENCY ANEMIA TYPE: ICD-10-CM

## 2018-08-17 DIAGNOSIS — E03.4 HYPOTHYROIDISM DUE TO ACQUIRED ATROPHY OF THYROID: ICD-10-CM

## 2018-08-17 DIAGNOSIS — E11.9 CONTROLLED TYPE 2 DIABETES MELLITUS WITHOUT COMPLICATION, WITHOUT LONG-TERM CURRENT USE OF INSULIN (HCC): ICD-10-CM

## 2018-08-17 LAB
ALBUMIN SERPL BCP-MCNC: 3.8 G/DL (ref 3.2–4.9)
ALBUMIN/GLOB SERPL: 1.1 G/DL
ALP SERPL-CCNC: 84 U/L (ref 30–99)
ALT SERPL-CCNC: 6 U/L (ref 2–50)
ANION GAP SERPL CALC-SCNC: 7 MMOL/L (ref 0–11.9)
AST SERPL-CCNC: 16 U/L (ref 12–45)
BASOPHILS # BLD AUTO: 0.9 % (ref 0–1.8)
BASOPHILS # BLD: 0.04 K/UL (ref 0–0.12)
BILIRUB SERPL-MCNC: 0.4 MG/DL (ref 0.1–1.5)
BUN SERPL-MCNC: 6 MG/DL (ref 8–22)
CALCIUM SERPL-MCNC: 9.1 MG/DL (ref 8.5–10.5)
CHLORIDE SERPL-SCNC: 97 MMOL/L (ref 96–112)
CO2 SERPL-SCNC: 24 MMOL/L (ref 20–33)
CREAT SERPL-MCNC: 0.71 MG/DL (ref 0.5–1.4)
CREAT UR-MCNC: 75.7 MG/DL
EOSINOPHIL # BLD AUTO: 0.45 K/UL (ref 0–0.51)
EOSINOPHIL NFR BLD: 9.8 % (ref 0–6.9)
ERYTHROCYTE [DISTWIDTH] IN BLOOD BY AUTOMATED COUNT: 47.8 FL (ref 35.9–50)
EST. AVERAGE GLUCOSE BLD GHB EST-MCNC: 126 MG/DL
GLOBULIN SER CALC-MCNC: 3.5 G/DL (ref 1.9–3.5)
GLUCOSE SERPL-MCNC: 77 MG/DL (ref 65–99)
HBA1C MFR BLD: 6 % (ref 0–5.6)
HCT VFR BLD AUTO: 41.2 % (ref 42–52)
HGB BLD-MCNC: 14.2 G/DL (ref 14–18)
IMM GRANULOCYTES # BLD AUTO: 0.01 K/UL (ref 0–0.11)
IMM GRANULOCYTES NFR BLD AUTO: 0.2 % (ref 0–0.9)
LYMPHOCYTES # BLD AUTO: 1.66 K/UL (ref 1–4.8)
LYMPHOCYTES NFR BLD: 36.3 % (ref 22–41)
MCH RBC QN AUTO: 30.1 PG (ref 27–33)
MCHC RBC AUTO-ENTMCNC: 34.5 G/DL (ref 33.7–35.3)
MCV RBC AUTO: 87.5 FL (ref 81.4–97.8)
MICROALBUMIN UR-MCNC: 1.3 MG/DL
MICROALBUMIN/CREAT UR: 17 MG/G (ref 0–30)
MONOCYTES # BLD AUTO: 0.53 K/UL (ref 0–0.85)
MONOCYTES NFR BLD AUTO: 11.6 % (ref 0–13.4)
NEUTROPHILS # BLD AUTO: 1.88 K/UL (ref 1.82–7.42)
NEUTROPHILS NFR BLD: 41.2 % (ref 44–72)
NRBC # BLD AUTO: 0 K/UL
NRBC BLD-RTO: 0 /100 WBC
PLATELET # BLD AUTO: 232 K/UL (ref 164–446)
PMV BLD AUTO: 10.2 FL (ref 9–12.9)
POTASSIUM SERPL-SCNC: 4.3 MMOL/L (ref 3.6–5.5)
PROT SERPL-MCNC: 7.3 G/DL (ref 6–8.2)
RBC # BLD AUTO: 4.71 M/UL (ref 4.7–6.1)
SODIUM SERPL-SCNC: 128 MMOL/L (ref 135–145)
TSH SERPL DL<=0.005 MIU/L-ACNC: 1.3 UIU/ML (ref 0.38–5.33)
WBC # BLD AUTO: 4.6 K/UL (ref 4.8–10.8)

## 2018-08-17 PROCEDURE — 85025 COMPLETE CBC W/AUTO DIFF WBC: CPT

## 2018-08-17 PROCEDURE — 80053 COMPREHEN METABOLIC PANEL: CPT

## 2018-08-17 PROCEDURE — 83036 HEMOGLOBIN GLYCOSYLATED A1C: CPT | Mod: GA

## 2018-08-17 PROCEDURE — 82570 ASSAY OF URINE CREATININE: CPT

## 2018-08-17 PROCEDURE — 82043 UR ALBUMIN QUANTITATIVE: CPT

## 2018-08-17 PROCEDURE — 36415 COLL VENOUS BLD VENIPUNCTURE: CPT

## 2018-08-17 PROCEDURE — 84443 ASSAY THYROID STIM HORMONE: CPT

## 2018-08-20 DIAGNOSIS — R73.02 IGT (IMPAIRED GLUCOSE TOLERANCE): ICD-10-CM

## 2018-09-01 ENCOUNTER — HOSPITAL ENCOUNTER (EMERGENCY)
Dept: HOSPITAL 8 - ED | Age: 53
Discharge: HOME | End: 2018-09-01
Payer: MEDICARE

## 2018-09-01 VITALS — HEIGHT: 72 IN | WEIGHT: 183.42 LBS | BODY MASS INDEX: 24.84 KG/M2

## 2018-09-01 VITALS — DIASTOLIC BLOOD PRESSURE: 86 MMHG | SYSTOLIC BLOOD PRESSURE: 123 MMHG

## 2018-09-01 DIAGNOSIS — M54.9: Primary | ICD-10-CM

## 2018-09-01 DIAGNOSIS — E11.9: ICD-10-CM

## 2018-09-01 DIAGNOSIS — F25.9: ICD-10-CM

## 2018-09-01 DIAGNOSIS — Z86.73: ICD-10-CM

## 2018-09-01 DIAGNOSIS — F31.9: ICD-10-CM

## 2018-09-01 DIAGNOSIS — I10: ICD-10-CM

## 2018-09-01 PROCEDURE — 99282 EMERGENCY DEPT VISIT SF MDM: CPT

## 2018-09-06 ENCOUNTER — OFFICE VISIT (OUTPATIENT)
Dept: MEDICAL GROUP | Facility: MEDICAL CENTER | Age: 53
End: 2018-09-06
Payer: MEDICARE

## 2018-09-06 VITALS
HEART RATE: 90 BPM | HEIGHT: 72 IN | TEMPERATURE: 97.4 F | RESPIRATION RATE: 16 BRPM | DIASTOLIC BLOOD PRESSURE: 62 MMHG | OXYGEN SATURATION: 96 % | SYSTOLIC BLOOD PRESSURE: 126 MMHG | BODY MASS INDEX: 25.6 KG/M2 | WEIGHT: 189 LBS

## 2018-09-06 DIAGNOSIS — G89.29 CHRONIC RIGHT SHOULDER PAIN: ICD-10-CM

## 2018-09-06 DIAGNOSIS — Z23 NEED FOR VACCINATION: ICD-10-CM

## 2018-09-06 DIAGNOSIS — G89.29 CHRONIC BILATERAL LOW BACK PAIN WITHOUT SCIATICA: ICD-10-CM

## 2018-09-06 DIAGNOSIS — M54.50 CHRONIC BILATERAL LOW BACK PAIN WITHOUT SCIATICA: ICD-10-CM

## 2018-09-06 DIAGNOSIS — M25.511 CHRONIC RIGHT SHOULDER PAIN: ICD-10-CM

## 2018-09-06 PROBLEM — Z79.891 CHRONIC PRESCRIPTION OPIATE USE: Status: RESOLVED | Noted: 2018-07-12 | Resolved: 2018-09-06

## 2018-09-06 PROBLEM — S42.101A FRACTURE OF RIGHT SCAPULA: Status: RESOLVED | Noted: 2018-05-11 | Resolved: 2018-09-06

## 2018-09-06 PROCEDURE — 90686 IIV4 VACC NO PRSV 0.5 ML IM: CPT | Performed by: INTERNAL MEDICINE

## 2018-09-06 PROCEDURE — 99214 OFFICE O/P EST MOD 30 MIN: CPT | Mod: 25 | Performed by: INTERNAL MEDICINE

## 2018-09-06 PROCEDURE — G0008 ADMIN INFLUENZA VIRUS VAC: HCPCS | Performed by: INTERNAL MEDICINE

## 2018-09-06 RX ORDER — BENZTROPINE MESYLATE 2 MG/1
2 TABLET ORAL 2 TIMES DAILY
COMMUNITY
End: 2019-07-29

## 2018-09-06 RX ORDER — CELECOXIB 200 MG/1
200 CAPSULE ORAL DAILY
Qty: 30 CAP | Refills: 6 | Status: SHIPPED | OUTPATIENT
Start: 2018-09-06 | End: 2018-09-06 | Stop reason: SDUPTHER

## 2018-09-06 RX ORDER — CELECOXIB 200 MG/1
200 CAPSULE ORAL DAILY
Qty: 30 CAP | Refills: 6 | Status: SHIPPED | OUTPATIENT
Start: 2018-09-06 | End: 2018-12-18 | Stop reason: SDUPTHER
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
## 2018-09-08 ENCOUNTER — HOSPITAL ENCOUNTER (OUTPATIENT)
Facility: MEDICAL CENTER | Age: 53
End: 2018-09-08
Attending: INTERNAL MEDICINE
Payer: MEDICARE

## 2018-09-08 DIAGNOSIS — Z12.11 SCREEN FOR COLON CANCER: ICD-10-CM

## 2018-09-08 PROCEDURE — 82274 ASSAY TEST FOR BLOOD FECAL: CPT

## 2018-09-09 LAB — HEMOCCULT STL QL IA: NEGATIVE

## 2018-09-12 RX ORDER — LEVOTHYROXINE SODIUM 0.05 MG/1
50 TABLET ORAL
Qty: 90 TAB | Refills: 3 | Status: SHIPPED | OUTPATIENT
Start: 2018-09-12 | End: 2019-08-19 | Stop reason: SDUPTHER

## 2018-09-21 RX ORDER — FERROUS SULFATE 325(65) MG
TABLET ORAL
Qty: 30 TAB | Refills: 3 | Status: SHIPPED | OUTPATIENT
Start: 2018-09-21 | End: 2018-10-18 | Stop reason: SDUPTHER

## 2018-10-07 ENCOUNTER — HOSPITAL ENCOUNTER (OUTPATIENT)
Facility: MEDICAL CENTER | Age: 53
End: 2018-10-07
Attending: INTERNAL MEDICINE
Payer: MEDICARE

## 2018-10-07 PROCEDURE — 82274 ASSAY TEST FOR BLOOD FECAL: CPT

## 2018-10-09 LAB — HEMOCCULT STL QL IA: NEGATIVE

## 2018-10-18 RX ORDER — FERROUS SULFATE 325(65) MG
TABLET ORAL
Qty: 30 TAB | Refills: 10 | Status: SHIPPED | OUTPATIENT
Start: 2018-10-18 | End: 2019-01-08 | Stop reason: SDUPTHER

## 2018-11-06 ENCOUNTER — TELEPHONE (OUTPATIENT)
Dept: MEDICAL GROUP | Facility: MEDICAL CENTER | Age: 53
End: 2018-11-06

## 2018-11-06 DIAGNOSIS — M79.671 PAIN IN BOTH FEET: ICD-10-CM

## 2018-11-06 DIAGNOSIS — M79.672 PAIN IN BOTH FEET: ICD-10-CM

## 2018-11-16 RX ORDER — DOCUSATE SODIUM 100 MG/1
CAPSULE, LIQUID FILLED ORAL
Qty: 60 CAP | Refills: 10 | Status: SHIPPED | OUTPATIENT
Start: 2018-11-16 | End: 2019-09-17 | Stop reason: SDUPTHER

## 2018-12-17 RX ORDER — BACLOFEN 5 MG/1
TABLET ORAL
Refills: 1 | COMMUNITY
Start: 2018-10-27 | End: 2019-12-05 | Stop reason: SDUPTHER

## 2018-12-18 ENCOUNTER — OFFICE VISIT (OUTPATIENT)
Dept: MEDICAL GROUP | Facility: MEDICAL CENTER | Age: 53
End: 2018-12-18
Payer: MEDICARE

## 2018-12-18 VITALS
WEIGHT: 190 LBS | BODY MASS INDEX: 25.73 KG/M2 | HEART RATE: 83 BPM | DIASTOLIC BLOOD PRESSURE: 80 MMHG | SYSTOLIC BLOOD PRESSURE: 136 MMHG | OXYGEN SATURATION: 96 % | TEMPERATURE: 97.7 F | HEIGHT: 72 IN

## 2018-12-18 DIAGNOSIS — M54.2 NECK PAIN: ICD-10-CM

## 2018-12-18 DIAGNOSIS — M25.511 CHRONIC RIGHT SHOULDER PAIN: ICD-10-CM

## 2018-12-18 DIAGNOSIS — G89.29 CHRONIC RIGHT SHOULDER PAIN: ICD-10-CM

## 2018-12-18 DIAGNOSIS — M25.562 CHRONIC PAIN OF LEFT KNEE: ICD-10-CM

## 2018-12-18 DIAGNOSIS — G89.29 CHRONIC PAIN OF LEFT KNEE: ICD-10-CM

## 2018-12-18 PROBLEM — S32.599A CLOSED FRACTURE OF MULTIPLE PUBIC RAMI (HCC): Status: RESOLVED | Noted: 2018-05-11 | Resolved: 2018-12-18

## 2018-12-18 PROCEDURE — 99214 OFFICE O/P EST MOD 30 MIN: CPT | Performed by: INTERNAL MEDICINE

## 2018-12-18 RX ORDER — CELECOXIB 200 MG/1
200 CAPSULE ORAL 2 TIMES DAILY
Qty: 60 CAP | Refills: 6 | Status: SHIPPED | OUTPATIENT
Start: 2018-12-18 | End: 2019-05-28
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
## 2019-01-08 RX ORDER — FERROUS SULFATE 325(65) MG
TABLET ORAL
Qty: 30 TAB | Refills: 10 | Status: SHIPPED | OUTPATIENT
Start: 2019-01-08 | End: 2019-04-02 | Stop reason: SDUPTHER

## 2019-02-05 RX ORDER — FAMOTIDINE 20 MG/1
TABLET, FILM COATED ORAL
Qty: 180 TAB | Refills: 3 | Status: SHIPPED | OUTPATIENT
Start: 2019-02-05 | End: 2020-01-06

## 2019-02-27 ENCOUNTER — OFFICE VISIT (OUTPATIENT)
Dept: MEDICAL GROUP | Facility: MEDICAL CENTER | Age: 54
End: 2019-02-27
Payer: MEDICARE

## 2019-02-27 VITALS
HEIGHT: 72 IN | WEIGHT: 196 LBS | DIASTOLIC BLOOD PRESSURE: 82 MMHG | OXYGEN SATURATION: 94 % | HEART RATE: 82 BPM | SYSTOLIC BLOOD PRESSURE: 126 MMHG | BODY MASS INDEX: 26.55 KG/M2 | TEMPERATURE: 97.4 F

## 2019-02-27 DIAGNOSIS — R73.02 IGT (IMPAIRED GLUCOSE TOLERANCE): ICD-10-CM

## 2019-02-27 DIAGNOSIS — I10 HTN (HYPERTENSION), BENIGN: ICD-10-CM

## 2019-02-27 DIAGNOSIS — G89.29 CHRONIC RIGHT SHOULDER PAIN: ICD-10-CM

## 2019-02-27 DIAGNOSIS — M25.552 PAIN OF LEFT HIP JOINT: ICD-10-CM

## 2019-02-27 DIAGNOSIS — M25.511 CHRONIC RIGHT SHOULDER PAIN: ICD-10-CM

## 2019-02-27 DIAGNOSIS — Z13.6 SCREENING FOR CARDIOVASCULAR CONDITION: ICD-10-CM

## 2019-02-27 PROBLEM — D64.9 ANEMIA: Status: RESOLVED | Noted: 2018-05-14 | Resolved: 2019-02-27

## 2019-02-27 PROCEDURE — 99214 OFFICE O/P EST MOD 30 MIN: CPT | Performed by: INTERNAL MEDICINE
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
## 2019-03-28 ENCOUNTER — HOSPITAL ENCOUNTER (OUTPATIENT)
Dept: LAB | Facility: MEDICAL CENTER | Age: 54
End: 2019-03-28
Attending: INTERNAL MEDICINE
Payer: MEDICARE

## 2019-03-28 DIAGNOSIS — Z13.6 SCREENING FOR CARDIOVASCULAR CONDITION: ICD-10-CM

## 2019-03-28 DIAGNOSIS — R73.02 IGT (IMPAIRED GLUCOSE TOLERANCE): ICD-10-CM

## 2019-03-28 LAB
ALBUMIN SERPL BCP-MCNC: 4.3 G/DL (ref 3.2–4.9)
ALBUMIN/GLOB SERPL: 1.3 G/DL
ALP SERPL-CCNC: 71 U/L (ref 30–99)
ALT SERPL-CCNC: 11 U/L (ref 2–50)
ANION GAP SERPL CALC-SCNC: 4 MMOL/L (ref 0–11.9)
AST SERPL-CCNC: 21 U/L (ref 12–45)
BILIRUB SERPL-MCNC: 0.4 MG/DL (ref 0.1–1.5)
BUN SERPL-MCNC: 9 MG/DL (ref 8–22)
CALCIUM SERPL-MCNC: 9.8 MG/DL (ref 8.5–10.5)
CHLORIDE SERPL-SCNC: 101 MMOL/L (ref 96–112)
CHOLEST SERPL-MCNC: 125 MG/DL (ref 100–199)
CO2 SERPL-SCNC: 26 MMOL/L (ref 20–33)
CREAT SERPL-MCNC: 0.79 MG/DL (ref 0.5–1.4)
CREAT UR-MCNC: 68 MG/DL
FASTING STATUS PATIENT QL REPORTED: NORMAL
GLOBULIN SER CALC-MCNC: 3.3 G/DL (ref 1.9–3.5)
GLUCOSE SERPL-MCNC: 81 MG/DL (ref 65–99)
HDLC SERPL-MCNC: 40 MG/DL
LDLC SERPL CALC-MCNC: 74 MG/DL
MICROALBUMIN UR-MCNC: 1.4 MG/DL
MICROALBUMIN/CREAT UR: 21 MG/G (ref 0–30)
POTASSIUM SERPL-SCNC: 4.6 MMOL/L (ref 3.6–5.5)
PROT SERPL-MCNC: 7.6 G/DL (ref 6–8.2)
SODIUM SERPL-SCNC: 131 MMOL/L (ref 135–145)
TRIGL SERPL-MCNC: 53 MG/DL (ref 0–149)

## 2019-03-28 PROCEDURE — 36415 COLL VENOUS BLD VENIPUNCTURE: CPT

## 2019-03-28 PROCEDURE — 82570 ASSAY OF URINE CREATININE: CPT

## 2019-03-28 PROCEDURE — 80053 COMPREHEN METABOLIC PANEL: CPT

## 2019-03-28 PROCEDURE — 82043 UR ALBUMIN QUANTITATIVE: CPT

## 2019-03-28 PROCEDURE — 80061 LIPID PANEL: CPT

## 2019-03-28 PROCEDURE — 83036 HEMOGLOBIN GLYCOSYLATED A1C: CPT | Mod: GA

## 2019-03-29 LAB
EST. AVERAGE GLUCOSE BLD GHB EST-MCNC: 123 MG/DL
HBA1C MFR BLD: 5.9 % (ref 0–5.6)

## 2019-04-02 RX ORDER — FERROUS SULFATE 325(65) MG
TABLET ORAL
Qty: 90 TAB | Refills: 3 | Status: SHIPPED | OUTPATIENT
Start: 2019-04-02 | End: 2019-04-03 | Stop reason: SDUPTHER

## 2019-04-03 RX ORDER — FERROUS SULFATE 325(65) MG
325 TABLET ORAL DAILY
Qty: 90 TAB | Refills: 3 | Status: SHIPPED | OUTPATIENT
Start: 2019-04-03 | End: 2020-04-28 | Stop reason: SDUPTHER

## 2019-04-30 DIAGNOSIS — R73.02 IGT (IMPAIRED GLUCOSE TOLERANCE): ICD-10-CM

## 2019-05-05 ENCOUNTER — HOSPITAL ENCOUNTER (EMERGENCY)
Dept: HOSPITAL 8 - ED | Age: 54
Discharge: HOME | End: 2019-05-05
Payer: MEDICARE

## 2019-05-05 VITALS — SYSTOLIC BLOOD PRESSURE: 169 MMHG | DIASTOLIC BLOOD PRESSURE: 93 MMHG

## 2019-05-05 VITALS — BODY MASS INDEX: 25.95 KG/M2 | HEIGHT: 72 IN | WEIGHT: 191.58 LBS

## 2019-05-05 DIAGNOSIS — Z86.11: ICD-10-CM

## 2019-05-05 DIAGNOSIS — F25.9: ICD-10-CM

## 2019-05-05 DIAGNOSIS — F43.10: ICD-10-CM

## 2019-05-05 DIAGNOSIS — R51: ICD-10-CM

## 2019-05-05 DIAGNOSIS — Z86.19: ICD-10-CM

## 2019-05-05 DIAGNOSIS — M54.5: ICD-10-CM

## 2019-05-05 DIAGNOSIS — Z86.73: ICD-10-CM

## 2019-05-05 DIAGNOSIS — E11.9: ICD-10-CM

## 2019-05-05 DIAGNOSIS — R53.1: Primary | ICD-10-CM

## 2019-05-05 DIAGNOSIS — F31.9: ICD-10-CM

## 2019-05-05 DIAGNOSIS — I10: ICD-10-CM

## 2019-05-05 DIAGNOSIS — F29: ICD-10-CM

## 2019-05-05 LAB
ALBUMIN SERPL-MCNC: 3.7 G/DL (ref 3.4–5)
ANION GAP SERPL CALC-SCNC: 11 MMOL/L (ref 5–15)
BASOPHILS # BLD AUTO: 0.01 X10^3/UL (ref 0–0.1)
BASOPHILS NFR BLD AUTO: 0 % (ref 0–1)
CALCIUM SERPL-MCNC: 8.5 MG/DL (ref 8.5–10.1)
CHLORIDE SERPL-SCNC: 104 MMOL/L (ref 98–107)
CREAT SERPL-MCNC: 0.8 MG/DL (ref 0.7–1.3)
EOSINOPHIL # BLD AUTO: 0.16 X10^3/UL (ref 0–0.4)
EOSINOPHIL NFR BLD AUTO: 3 % (ref 1–7)
ERYTHROCYTE [DISTWIDTH] IN BLOOD BY AUTOMATED COUNT: 14.9 % (ref 9.4–14.8)
LYMPHOCYTES # BLD AUTO: 1.2 X10^3/UL (ref 1–3.4)
LYMPHOCYTES NFR BLD AUTO: 22 % (ref 22–44)
MCH RBC QN AUTO: 31.9 PG (ref 27.5–34.5)
MCHC RBC AUTO-ENTMCNC: 34.2 G/DL (ref 33.2–36.2)
MCV RBC AUTO: 93.3 FL (ref 81–97)
MD: NO
MONOCYTES # BLD AUTO: 0.43 X10^3/UL (ref 0.2–0.8)
MONOCYTES NFR BLD AUTO: 8 % (ref 2–9)
NEUTROPHILS # BLD AUTO: 3.66 X10^3/UL (ref 1.8–6.8)
NEUTROPHILS NFR BLD AUTO: 67 % (ref 42–75)
PLATELET # BLD AUTO: 178 X10^3/UL (ref 130–400)
PMV BLD AUTO: 8.2 FL (ref 7.4–10.4)
RBC # BLD AUTO: 4.26 X10^6/UL (ref 4.38–5.82)

## 2019-05-05 PROCEDURE — 85025 COMPLETE CBC W/AUTO DIFF WBC: CPT

## 2019-05-05 PROCEDURE — 82040 ASSAY OF SERUM ALBUMIN: CPT

## 2019-05-05 PROCEDURE — 99284 EMERGENCY DEPT VISIT MOD MDM: CPT

## 2019-05-05 PROCEDURE — 36415 COLL VENOUS BLD VENIPUNCTURE: CPT

## 2019-05-05 PROCEDURE — 93005 ELECTROCARDIOGRAM TRACING: CPT

## 2019-05-05 PROCEDURE — 80048 BASIC METABOLIC PNL TOTAL CA: CPT

## 2019-05-28 ENCOUNTER — OFFICE VISIT (OUTPATIENT)
Dept: MEDICAL GROUP | Facility: MEDICAL CENTER | Age: 54
End: 2019-05-28
Payer: MEDICARE

## 2019-05-28 VITALS
HEIGHT: 72 IN | TEMPERATURE: 97 F | OXYGEN SATURATION: 98 % | SYSTOLIC BLOOD PRESSURE: 124 MMHG | HEART RATE: 72 BPM | BODY MASS INDEX: 26.55 KG/M2 | DIASTOLIC BLOOD PRESSURE: 78 MMHG | WEIGHT: 196 LBS

## 2019-05-28 DIAGNOSIS — M25.511 CHRONIC RIGHT SHOULDER PAIN: ICD-10-CM

## 2019-05-28 DIAGNOSIS — G89.29 CHRONIC RIGHT SHOULDER PAIN: ICD-10-CM

## 2019-05-28 DIAGNOSIS — R73.02 IGT (IMPAIRED GLUCOSE TOLERANCE): ICD-10-CM

## 2019-05-28 DIAGNOSIS — Z13.220 SCREENING, LIPID: ICD-10-CM

## 2019-05-28 DIAGNOSIS — E03.9 ACQUIRED HYPOTHYROIDISM: ICD-10-CM

## 2019-05-28 PROCEDURE — 99214 OFFICE O/P EST MOD 30 MIN: CPT | Performed by: INTERNAL MEDICINE

## 2019-05-28 RX ORDER — NAPROXEN 375 MG/1
375 TABLET ORAL 2 TIMES DAILY PRN
Qty: 60 TAB | Refills: 6 | Status: SHIPPED | OUTPATIENT
Start: 2019-05-28 | End: 2019-12-05

## 2019-05-28 NOTE — PROGRESS NOTES
CC:Follow-up blood sugars, thyroid, chronic shoulder pain.                                                                                                                             HPI:   Haile presents today with the following.    1. IGT (impaired glucose tolerance)  Blood sugars intermittently elevated in the past has been doing much better with his exercise and A1c was 5.9 at last check.    2. Acquired hypothyroidism  Been on thyroid medications for approximately 1 year he is coming due for recheck of thyroid.  Well replaced as of last check    3. Chronic right shoulder pain  Longer taking Celebrex now on naproxen he was seen by pain specialist changing this regimen.  He reports his pain is under much better control and not needing medications on a daily basis.        Patient Active Problem List    Diagnosis Date Noted   • Schizo affective schizophrenia (HCC) 04/22/2010     Priority: High   • Hepatitis C 05/24/2010     Priority: Medium   • HTN (hypertension), benign 04/22/2010     Priority: Medium   • Lung calcification 05/11/2018     Priority: Low   • GERD (gastroesophageal reflux disease) 07/14/2013     Priority: Low   • Acquired hypothyroidism 05/28/2019   • IGT (impaired glucose tolerance) 03/21/2017   • Encounter for long-term (current) use of other medications 01/29/2015       Current Outpatient Prescriptions   Medication Sig Dispense Refill   • naproxen (NAPROSYN) 375 MG Tab Take 1 Tab by mouth 2 times a day as needed. 60 Tab 6   • metFORMIN (GLUCOPHAGE) 500 MG Tab TAKE 1 TABLET BY MOUTH EVERY MORNING 90 Tab 3   • ferrous sulfate 325 (65 Fe) MG tablet Take 1 Tab by mouth every day. 90 Tab 3   • famotidine (PEPCID) 20 MG Tab TAKE 1 TABLET BY MOUTH TWICE A  Tab 3   • Baclofen 5 MG Tab TAKE 1 TABLET BY MOUTH TWICE A DAY  1   •  MG Cap TAKE 1 CAPSULE BY MOUTH TWICE A DAY 60 Cap 10   • levothyroxine (SYNTHROID) 50 MCG Tab Take 1 Tab by mouth Every morning on an empty stomach. 90 Tab 3    • tamsulosin (FLOMAX) 0.4 MG capsule Take 1 Cap by mouth ONE-HALF HOUR AFTER BREAKFAST. 90 Cap 3   • benztropine (COGENTIN) 1 MG Tab Take 3 Tabs by mouth 2 times a day. 60 Tab    • divalproex ER (DEPAKOTE ER) 500 MG TABLET SR 24 HR Take 2 Tabs by mouth 2 Times a Day. 30 Tab 5   • olanzapine (ZYPREXA) 20 MG tablet 2 Times a Day.  5   • benztropine (COGENTIN) 2 MG Tab Take 2 mg by mouth 2 times a day.       No current facility-administered medications for this visit.          Allergies as of 05/28/2019 - Reviewed 05/28/2019   Allergen Reaction Noted   • Hctz  05/24/2012   • Nsaids  05/11/2018   • Other drug Nausea 04/22/2010   • Vicodin [hydrocodone-acetaminophen] Vomiting 11/12/2007   • Vicodin [hydrocodone-acetaminophen]  05/11/2018   • Amoxicillin Nausea 04/22/2010        ROS: Denies Chest pain, SOB, LE edema.    /78 (BP Location: Right arm, Patient Position: Sitting)   Pulse 72   Temp 36.1 °C (97 °F)   Ht 1.829 m (6')   Wt 88.9 kg (196 lb)   SpO2 98%   BMI 26.58 kg/m²     Physical Exam:  Gen:         Alert and oriented, No apparent distress.  Neck:        No Lymphadenopathy or Bruits.  Lungs:     Clear to auscultation bilaterally  CV:          Regular rate and rhythm. No murmurs, rubs or gallops.               Ext:          No clubbing, cyanosis, edema.      Assessment and Plan.   54 y.o. male with the following issues.    1. IGT (impaired glucose tolerance)  Cautioned about diet exercise and hydration recheck 6 months    2. Acquired hypothyroidism  Well-controlled as of last check sending to the lab for recheck  - TSH; Future    3. Chronic right shoulder pain  Refilled medication cautioned about side effects will go to as needed.  - naproxen (NAPROSYN) 375 MG Tab; Take 1 Tab by mouth 2 times a day as needed.  Dispense: 60 Tab; Refill: 6    4. Screening, lipid    - Comp Metabolic Panel; Future  - Lipid Profile; Future

## 2019-06-25 RX ORDER — TAMSULOSIN HYDROCHLORIDE 0.4 MG/1
CAPSULE ORAL
Qty: 90 CAP | Refills: 3 | Status: SHIPPED | OUTPATIENT
Start: 2019-06-25 | End: 2020-06-22

## 2019-07-29 ENCOUNTER — OFFICE VISIT (OUTPATIENT)
Dept: MEDICAL GROUP | Facility: MEDICAL CENTER | Age: 54
End: 2019-07-29
Payer: MEDICARE

## 2019-07-29 VITALS
SYSTOLIC BLOOD PRESSURE: 122 MMHG | DIASTOLIC BLOOD PRESSURE: 68 MMHG | HEART RATE: 91 BPM | TEMPERATURE: 97.6 F | HEIGHT: 72 IN | OXYGEN SATURATION: 96 % | WEIGHT: 190 LBS | BODY MASS INDEX: 25.73 KG/M2

## 2019-07-29 DIAGNOSIS — K11.7 DROOLING: ICD-10-CM

## 2019-07-29 DIAGNOSIS — I10 HTN (HYPERTENSION), BENIGN: ICD-10-CM

## 2019-07-29 DIAGNOSIS — R73.02 IGT (IMPAIRED GLUCOSE TOLERANCE): ICD-10-CM

## 2019-07-29 PROCEDURE — 99214 OFFICE O/P EST MOD 30 MIN: CPT | Performed by: INTERNAL MEDICINE

## 2019-07-29 NOTE — PROGRESS NOTES
CC: Follow-up hypertension and blood sugars complaining of drooling.                                                                                                                                HPI:   Haile presents today with the following.    1. HTN (hypertension), benign  History of hypertension weight is actually down he is exercising regularly which typically controls his blood sugar without need for medications.  He has been on ACE inhibitor in the past but frequently is out in the heat walking and has get dizzy easily.  At his center he is having regular blood pressure checks.    2. IGT (impaired glucose tolerance)  Blood sugars will be coming due he is maintained on metformin denying any bowel changes.    3. Drooling  Ports he is frequently drooling questioning whether any of his medications can cause this as a side effect.  He is on multiple psychiatric medications does have Cogentin as well.  Denies any urinary changes again no other complaints surrounding the drooling      Patient Active Problem List    Diagnosis Date Noted   • Schizo affective schizophrenia (HCC) 04/22/2010     Priority: High   • Hepatitis C 05/24/2010     Priority: Medium   • HTN (hypertension), benign 04/22/2010     Priority: Medium   • Lung calcification 05/11/2018     Priority: Low   • GERD (gastroesophageal reflux disease) 07/14/2013     Priority: Low   • Acquired hypothyroidism 05/28/2019   • IGT (impaired glucose tolerance) 03/21/2017   • Encounter for long-term (current) use of other medications 01/29/2015       Current Outpatient Prescriptions   Medication Sig Dispense Refill   • tamsulosin (FLOMAX) 0.4 MG capsule TAKE 1 CAPSULE BY MOUTH ONE-HALF HOUR AFTER BREAKFAST 90 Cap 3   • naproxen (NAPROSYN) 375 MG Tab Take 1 Tab by mouth 2 times a day as needed. 60 Tab 6   • metFORMIN (GLUCOPHAGE) 500 MG Tab TAKE 1 TABLET BY MOUTH EVERY MORNING 90 Tab 3   • ferrous sulfate 325 (65 Fe) MG tablet Take 1 Tab by mouth every day.  90 Tab 3   • famotidine (PEPCID) 20 MG Tab TAKE 1 TABLET BY MOUTH TWICE A  Tab 3   •  MG Cap TAKE 1 CAPSULE BY MOUTH TWICE A DAY 60 Cap 10   • levothyroxine (SYNTHROID) 50 MCG Tab Take 1 Tab by mouth Every morning on an empty stomach. 90 Tab 3   • benztropine (COGENTIN) 1 MG Tab Take 3 Tabs by mouth 2 times a day. 60 Tab    • divalproex ER (DEPAKOTE ER) 500 MG TABLET SR 24 HR Take 2 Tabs by mouth 2 Times a Day. 30 Tab 5   • olanzapine (ZYPREXA) 20 MG tablet 2 Times a Day.  5   • Baclofen 5 MG Tab TAKE 1 TABLET BY MOUTH TWICE A DAY  1     No current facility-administered medications for this visit.          Allergies as of 07/29/2019 - Reviewed 07/29/2019   Allergen Reaction Noted   • Hctz  05/24/2012   • Nsaids  05/11/2018   • Other drug Nausea 04/22/2010   • Vicodin [hydrocodone-acetaminophen] Vomiting 11/12/2007   • Vicodin [hydrocodone-acetaminophen]  05/11/2018   • Amoxicillin Nausea 04/22/2010        ROS: Denies Chest pain, SOB, LE edema.    /68 (BP Location: Right arm, Patient Position: Sitting)   Pulse 91   Temp 36.4 °C (97.6 °F)   Ht 1.829 m (6')   Wt 86.2 kg (190 lb)   SpO2 96%   BMI 25.77 kg/m²     Physical Exam:  Gen:         Alert and oriented, No apparent distress.  Neck:        No Lymphadenopathy or Bruits.  Lungs:     Clear to auscultation bilaterally  CV:          Regular rate and rhythm. No murmurs, rubs or gallops.               Ext:          No clubbing, cyanosis, edema.      Assessment and Plan.   54 y.o. male with the following issues.    1. HTN (hypertension), benign  Currently well controlled, Discuss diet, exercise and salt restriction.  No change to medication therapy.    2. IGT (impaired glucose tolerance)  Recheck 1 month  - HEMOGLOBIN A1C; Future    3. Drooling  We will discuss with psychiatry to see if it is possibility med relationship however his current medications do not seem to be doing so that I write for.  He will follow-up

## 2019-08-19 ENCOUNTER — HOSPITAL ENCOUNTER (OUTPATIENT)
Dept: LAB | Facility: MEDICAL CENTER | Age: 54
End: 2019-08-19
Attending: INTERNAL MEDICINE
Payer: MEDICARE

## 2019-08-19 DIAGNOSIS — R73.02 IGT (IMPAIRED GLUCOSE TOLERANCE): ICD-10-CM

## 2019-08-19 DIAGNOSIS — Z13.220 SCREENING, LIPID: ICD-10-CM

## 2019-08-19 DIAGNOSIS — E03.9 ACQUIRED HYPOTHYROIDISM: ICD-10-CM

## 2019-08-19 LAB
ALBUMIN SERPL BCP-MCNC: 4.6 G/DL (ref 3.2–4.9)
ALBUMIN/GLOB SERPL: 1.3 G/DL
ALP SERPL-CCNC: 71 U/L (ref 30–99)
ALT SERPL-CCNC: 12 U/L (ref 2–50)
ANION GAP SERPL CALC-SCNC: 8 MMOL/L (ref 0–11.9)
AST SERPL-CCNC: 20 U/L (ref 12–45)
BILIRUB SERPL-MCNC: 0.5 MG/DL (ref 0.1–1.5)
BUN SERPL-MCNC: 6 MG/DL (ref 8–22)
CALCIUM SERPL-MCNC: 10.3 MG/DL (ref 8.5–10.5)
CHLORIDE SERPL-SCNC: 101 MMOL/L (ref 96–112)
CHOLEST SERPL-MCNC: 146 MG/DL (ref 100–199)
CO2 SERPL-SCNC: 25 MMOL/L (ref 20–33)
CREAT SERPL-MCNC: 0.85 MG/DL (ref 0.5–1.4)
GLOBULIN SER CALC-MCNC: 3.6 G/DL (ref 1.9–3.5)
GLUCOSE SERPL-MCNC: 98 MG/DL (ref 65–99)
HDLC SERPL-MCNC: 46 MG/DL
LDLC SERPL CALC-MCNC: 85 MG/DL
POTASSIUM SERPL-SCNC: 4.1 MMOL/L (ref 3.6–5.5)
PROT SERPL-MCNC: 8.2 G/DL (ref 6–8.2)
SODIUM SERPL-SCNC: 134 MMOL/L (ref 135–145)
TRIGL SERPL-MCNC: 73 MG/DL (ref 0–149)
TSH SERPL DL<=0.005 MIU/L-ACNC: 1.62 UIU/ML (ref 0.38–5.33)

## 2019-08-19 PROCEDURE — 80053 COMPREHEN METABOLIC PANEL: CPT

## 2019-08-19 PROCEDURE — 36415 COLL VENOUS BLD VENIPUNCTURE: CPT | Mod: GA

## 2019-08-19 PROCEDURE — 83036 HEMOGLOBIN GLYCOSYLATED A1C: CPT | Mod: GA

## 2019-08-19 PROCEDURE — 80061 LIPID PANEL: CPT

## 2019-08-19 PROCEDURE — 84443 ASSAY THYROID STIM HORMONE: CPT

## 2019-08-19 RX ORDER — LEVOTHYROXINE SODIUM 0.05 MG/1
TABLET ORAL
Qty: 90 TAB | Refills: 3 | Status: SHIPPED | OUTPATIENT
Start: 2019-08-19 | End: 2020-07-21

## 2019-08-20 LAB
EST. AVERAGE GLUCOSE BLD GHB EST-MCNC: 123 MG/DL
HBA1C MFR BLD: 5.9 % (ref 0–5.6)

## 2019-09-17 RX ORDER — DOCUSATE SODIUM 100 MG
CAPSULE ORAL
Qty: 60 CAP | Refills: 10 | Status: SHIPPED
Start: 2019-09-17 | End: 2020-07-30

## 2019-11-07 RX ORDER — TRIHEXYPHENIDYL HYDROCHLORIDE 2 MG/1
2 TABLET ORAL DAILY
Refills: 1 | COMMUNITY
Start: 2019-08-21 | End: 2023-07-16

## 2019-11-08 ENCOUNTER — OFFICE VISIT (OUTPATIENT)
Dept: MEDICAL GROUP | Facility: MEDICAL CENTER | Age: 54
End: 2019-11-08
Payer: MEDICARE

## 2019-11-08 VITALS
SYSTOLIC BLOOD PRESSURE: 136 MMHG | WEIGHT: 200 LBS | TEMPERATURE: 97.3 F | DIASTOLIC BLOOD PRESSURE: 82 MMHG | HEIGHT: 72 IN | OXYGEN SATURATION: 96 % | BODY MASS INDEX: 27.09 KG/M2 | HEART RATE: 81 BPM

## 2019-11-08 DIAGNOSIS — R73.02 IGT (IMPAIRED GLUCOSE TOLERANCE): ICD-10-CM

## 2019-11-08 DIAGNOSIS — Z13.6 SCREENING FOR CARDIOVASCULAR CONDITION: ICD-10-CM

## 2019-11-08 DIAGNOSIS — Z12.11 SCREEN FOR COLON CANCER: ICD-10-CM

## 2019-11-08 DIAGNOSIS — Z23 NEED FOR VACCINATION: ICD-10-CM

## 2019-11-08 DIAGNOSIS — E03.9 ACQUIRED HYPOTHYROIDISM: ICD-10-CM

## 2019-11-08 DIAGNOSIS — I10 HTN (HYPERTENSION), BENIGN: ICD-10-CM

## 2019-11-08 PROCEDURE — G0008 ADMIN INFLUENZA VIRUS VAC: HCPCS | Performed by: INTERNAL MEDICINE

## 2019-11-08 PROCEDURE — 90686 IIV4 VACC NO PRSV 0.5 ML IM: CPT | Performed by: INTERNAL MEDICINE

## 2019-11-08 PROCEDURE — 99214 OFFICE O/P EST MOD 30 MIN: CPT | Mod: 25 | Performed by: INTERNAL MEDICINE

## 2019-11-08 NOTE — PROGRESS NOTES
CC: Follow-up hypertension, blood sugars, hypothyroidism.                                                                                                                                      HPI:   Haile presents today with the following.    1. HTN (hypertension), benign  Blood pressure slightly elevated today significantly lower in the past.  Currently not on medicationsBecause of previous because of previous dizziness.    2. IGT (impaired glucose tolerance)  Last A1c checked 2 months ago in a good range.  Denying any symptoms of hypoglycemia.    3. Acquired hypothyroidism  Maintain on thyroid medications again euthyroid at last check.      Patient Active Problem List    Diagnosis Date Noted   • Schizo affective schizophrenia (HCC) 04/22/2010     Priority: High   • Hepatitis C 05/24/2010     Priority: Medium   • HTN (hypertension), benign 04/22/2010     Priority: Medium   • Lung calcification 05/11/2018     Priority: Low   • GERD (gastroesophageal reflux disease) 07/14/2013     Priority: Low   • Acquired hypothyroidism 05/28/2019   • IGT (impaired glucose tolerance) 03/21/2017   • Encounter for long-term (current) use of other medications 01/29/2015       Current Outpatient Medications   Medication Sig Dispense Refill   • trihexyphenidyl (ARTANE) 2 MG Tab TAKE 1 TABLET BY MOUTH TWICE A DAY FOR DROOLING  1   • STOOL SOFTENER 100 MG Cap TAKE 1 CAPSULE BY MOUTH TWICE A DAY 60 Cap 10   • levothyroxine (SYNTHROID) 50 MCG Tab TAKE 1 TABLET BY MOUTH EVERY MORNING ON AN EMPTY STOMACH 90 Tab 3   • tamsulosin (FLOMAX) 0.4 MG capsule TAKE 1 CAPSULE BY MOUTH ONE-HALF HOUR AFTER BREAKFAST 90 Cap 3   • naproxen (NAPROSYN) 375 MG Tab Take 1 Tab by mouth 2 times a day as needed. 60 Tab 6   • metFORMIN (GLUCOPHAGE) 500 MG Tab TAKE 1 TABLET BY MOUTH EVERY MORNING 90 Tab 3   • ferrous sulfate 325 (65 Fe) MG tablet Take 1 Tab by mouth every day. 90 Tab 3   • famotidine (PEPCID) 20 MG Tab TAKE 1 TABLET BY MOUTH TWICE A   Tab 3   • Baclofen 5 MG Tab TAKE 1 TABLET BY MOUTH TWICE A DAY  1   • benztropine (COGENTIN) 1 MG Tab Take 3 Tabs by mouth 2 times a day. 60 Tab    • divalproex ER (DEPAKOTE ER) 500 MG TABLET SR 24 HR Take 2 Tabs by mouth 2 Times a Day. 30 Tab 5   • olanzapine (ZYPREXA) 20 MG tablet 2 Times a Day.  5     No current facility-administered medications for this visit.          Allergies as of 11/08/2019 - Reviewed 11/08/2019   Allergen Reaction Noted   • Hctz  05/24/2012   • Nsaids  05/11/2018   • Other drug Nausea 04/22/2010   • Vicodin [hydrocodone-acetaminophen] Vomiting 11/12/2007   • Vicodin [hydrocodone-acetaminophen]  05/11/2018   • Amoxicillin Nausea 04/22/2010        ROS: Denies Chest pain, SOB, LE edema.    /82 (BP Location: Right arm, Patient Position: Sitting)   Pulse 81   Temp 36.3 °C (97.3 °F)   Ht 1.829 m (6')   Wt 90.7 kg (200 lb)   SpO2 96%   BMI 27.12 kg/m²      Physical Exam:  Gen:         Alert and oriented, No apparent distress.  Neck:        No Lymphadenopathy or Bruits.  Lungs:     Clear to auscultation bilaterally  CV:          Regular rate and rhythm. No murmurs, rubs or gallops.               Ext:          No clubbing, cyanosis, edema.      Assessment and Plan.   54 y.o. male with the following issues.    1. HTN (hypertension), benign  Discussed salt restriction continue to monitor cut back on naproxen if possible.    2. IGT (impaired glucose tolerance)  Rechecking blood work.  - HEMOGLOBIN A1C; Future    3. Acquired hypothyroidism  Recheck thyroid.  - TSH; Future    4. Need for vaccination    - Influenza Vaccine Quad Injection (PF)    5. Screen for colon cancer  - COLOGUARD COLON CANCER SCREENING    6. Screening for cardiovascular condition    - Comp Metabolic Panel; Future  - Lipid Profile; Future

## 2019-12-05 ENCOUNTER — OFFICE VISIT (OUTPATIENT)
Dept: MEDICAL GROUP | Facility: MEDICAL CENTER | Age: 54
End: 2019-12-05
Payer: MEDICARE

## 2019-12-05 VITALS
TEMPERATURE: 97.1 F | WEIGHT: 204 LBS | OXYGEN SATURATION: 98 % | SYSTOLIC BLOOD PRESSURE: 130 MMHG | HEART RATE: 98 BPM | HEIGHT: 72 IN | BODY MASS INDEX: 27.63 KG/M2 | DIASTOLIC BLOOD PRESSURE: 80 MMHG

## 2019-12-05 DIAGNOSIS — F25.9 SCHIZO AFFECTIVE SCHIZOPHRENIA (HCC): ICD-10-CM

## 2019-12-05 DIAGNOSIS — B19.20 HEPATITIS C VIRUS INFECTION WITHOUT HEPATIC COMA, UNSPECIFIED CHRONICITY: ICD-10-CM

## 2019-12-05 DIAGNOSIS — E03.9 ACQUIRED HYPOTHYROIDISM: ICD-10-CM

## 2019-12-05 DIAGNOSIS — I10 HTN (HYPERTENSION), BENIGN: ICD-10-CM

## 2019-12-05 DIAGNOSIS — K21.9 GASTROESOPHAGEAL REFLUX DISEASE WITHOUT ESOPHAGITIS: ICD-10-CM

## 2019-12-05 DIAGNOSIS — R73.02 IGT (IMPAIRED GLUCOSE TOLERANCE): ICD-10-CM

## 2019-12-05 DIAGNOSIS — M54.2 NECK PAIN: ICD-10-CM

## 2019-12-05 DIAGNOSIS — Z00.00 MEDICARE ANNUAL WELLNESS VISIT, SUBSEQUENT: ICD-10-CM

## 2019-12-05 PROCEDURE — 99213 OFFICE O/P EST LOW 20 MIN: CPT | Mod: 25 | Performed by: INTERNAL MEDICINE

## 2019-12-05 PROCEDURE — G0439 PPPS, SUBSEQ VISIT: HCPCS | Performed by: INTERNAL MEDICINE

## 2019-12-05 RX ORDER — CELECOXIB 200 MG/1
200 CAPSULE ORAL 2 TIMES DAILY
Qty: 60 CAP | Refills: 11 | Status: SHIPPED | OUTPATIENT
Start: 2019-12-05 | End: 2019-12-05 | Stop reason: SDUPTHER

## 2019-12-05 RX ORDER — BACLOFEN 5 MG/1
5 TABLET ORAL 2 TIMES DAILY
Qty: 60 TAB | Refills: 11 | Status: SHIPPED | OUTPATIENT
Start: 2019-12-05 | End: 2019-12-09 | Stop reason: SDUPTHER

## 2019-12-05 RX ORDER — CELECOXIB 200 MG/1
200 CAPSULE ORAL 2 TIMES DAILY
Qty: 60 CAP | Refills: 11 | Status: SHIPPED | OUTPATIENT
Start: 2019-12-05 | End: 2019-12-09 | Stop reason: SDUPTHER

## 2019-12-05 ASSESSMENT — ACTIVITIES OF DAILY LIVING (ADL): BATHING_REQUIRES_ASSISTANCE: 0

## 2019-12-05 ASSESSMENT — PATIENT HEALTH QUESTIONNAIRE - PHQ9
CLINICAL INTERPRETATION OF PHQ2 SCORE: 1
SUM OF ALL RESPONSES TO PHQ QUESTIONS 1-9: 4
5. POOR APPETITE OR OVEREATING: 0 - NOT AT ALL

## 2019-12-05 ASSESSMENT — ENCOUNTER SYMPTOMS: GENERAL WELL-BEING: EXCELLENT

## 2019-12-05 NOTE — PROGRESS NOTES
CC: Neck pain due for wellness exam.     HPI:   Haile presents today with the following.      1. Medicare annual wellness visit, subsequent  Screenings performed below information given on advanced directives    2. Neck pain  Presents reporting that he cannot get into the pain specialist or for that he is willing to try alternative agents for his neck and other arthralgias.  He is been on Celebrex in the past he does have stomach issues with other NSAIDs.  He is also not currently taking his baclofen.      Information for advance directives given to patient or instructed to bring in advance directives into to office to put in chart.      Depression Screening    Little interest or pleasure in doing things?  0 - not at all  Feeling down, depressed , or hopeless? 1 - several days  Trouble falling or staying asleep, or sleeping too much?  0 - not at all  Feeling tired or having little energy?  0 - not at all  Poor appetite or overeating?  0 - not at all  Feeling bad about yourself - or that you are a failure or have let yourself or your family down? 0 - not at all  Trouble concentrating on things, such as reading the newspaper or watching television? 3 - nearly every day  Moving or speaking so slowly that other people could have noticed.  Or the opposite - being so fidgety or restless that you have been moving around a lot more than usual?  0 - not at all  Thoughts that you would be better off dead, or of hurting yourself?  0 - not at all  Patient Health Questionnaire Score: 4    If depressive symptoms identified deferred to follow up visit unless specifically addressed in assessment and plan.    Interpretation of PHQ-9 Total Score   Score Severity   1-4 No Depression   5-9 Mild Depression   10-14 Moderate Depression   15-19 Moderately Severe Depression   20-27 Severe Depression      Screening for Cognitive Impairment    Three Minute Recall (village, kitchen, baby) 3/3    Edwin clock face with all 12 numbers and set  the hands to show 10 past 10.  No    Cognitive concerns identified deferred for follow up unless specifically addressed in assessment and plan.    Fall Risk Assessment    Has the patient had two or more falls in the last year or any fall with injury in the last year?  No    Safety Assessment    Throw rugs on floor.  No  Handrails on all stairs.  Yes  Good lighting in all hallways.  Yes  Difficulty hearing.  No  Patient counseled about all safety risks that were identified.    Functional Assessment ADLs    Are there any barriers preventing you from cooking for yourself or meeting nutritional needs?  No.    Are there any barriers preventing you from driving safely or obtaining transportation?  Yes.    Are there any barriers preventing you from using a telephone or calling for help?  No.    Are there any barriers preventing you from shopping?  No.    Are there any barriers preventing you from taking care of your own finances?  No.    Are there any barriers preventing you from managing your medications?  No.    Are there any barriers preventing you from showering, bathing or dressing yourself? No.    Are you currently engaging in any exercise or physical activity?  Yes.     What is your perception of your health?  Excellent.      Health Maintenance Summary                COLOGUARD STOOL DNA Overdue 1/19/2013     IMM ZOSTER VACCINES Postponed 9/12/2030 Originally 1/19/2013. Insurance/Financial    Annual Wellness Visit Next Due 12/5/2020      Done 12/5/2019 Visit Dx: Medicare annual wellness visit, subsequent     Patient has more history with this topic...    IMM DTaP/Tdap/Td Vaccine Next Due 5/24/2022      Done 5/24/2012 Imm Admin: Tdap Vaccine          Patient Care Team:  Librado Colin M.D. as PCP - General (Internal Medicine)  Librado Morfin, Ph.D. (Inactive) as Consulting Physician (Psychology)  Jack Temple P.A.-C. as Consulting Physician (Gastroenterology)          Health Care Screening: Age-appropriate  preventive services that Medicare covers were discussed today and ordered as indicated and agreed upon by the patient, and as recommended by USPTF and ACIP.     Patient Active Problem List    Diagnosis Date Noted   • Schizo affective schizophrenia (HCC) 04/22/2010     Priority: High   • Hepatitis C 05/24/2010     Priority: Medium   • HTN (hypertension), benign 04/22/2010     Priority: Medium   • Lung calcification 05/11/2018     Priority: Low   • GERD (gastroesophageal reflux disease) 07/14/2013     Priority: Low   • Acquired hypothyroidism 05/28/2019   • IGT (impaired glucose tolerance) 03/21/2017   • Encounter for long-term (current) use of other medications 01/29/2015       Current Outpatient Medications   Medication Sig Dispense Refill   • Baclofen 5 MG Tab Take 5 mg by mouth 2 Times a Day. TAKE 1 TABLET BY MOUTH TWICE A DAY 60 Tab 11   • celecoxib (CELEBREX) 200 MG Cap Take 1 Cap by mouth 2 times a day. 60 Cap 11   • trihexyphenidyl (ARTANE) 2 MG Tab TAKE 1 TABLET BY MOUTH TWICE A DAY FOR DROOLING  1   • STOOL SOFTENER 100 MG Cap TAKE 1 CAPSULE BY MOUTH TWICE A DAY 60 Cap 10   • levothyroxine (SYNTHROID) 50 MCG Tab TAKE 1 TABLET BY MOUTH EVERY MORNING ON AN EMPTY STOMACH 90 Tab 3   • tamsulosin (FLOMAX) 0.4 MG capsule TAKE 1 CAPSULE BY MOUTH ONE-HALF HOUR AFTER BREAKFAST 90 Cap 3   • metFORMIN (GLUCOPHAGE) 500 MG Tab TAKE 1 TABLET BY MOUTH EVERY MORNING 90 Tab 3   • ferrous sulfate 325 (65 Fe) MG tablet Take 1 Tab by mouth every day. 90 Tab 3   • famotidine (PEPCID) 20 MG Tab TAKE 1 TABLET BY MOUTH TWICE A  Tab 3   • benztropine (COGENTIN) 1 MG Tab Take 3 Tabs by mouth 2 times a day. 60 Tab    • divalproex ER (DEPAKOTE ER) 500 MG TABLET SR 24 HR Take 2 Tabs by mouth 2 Times a Day. 30 Tab 5   • olanzapine (ZYPREXA) 20 MG tablet 2 Times a Day.  5     No current facility-administered medications for this visit.        Family History   Problem Relation Age of Onset   • Genetic Disorder Neg Hx        Social  History     Socioeconomic History   • Marital status: Unknown     Spouse name: Not on file   • Number of children: Not on file   • Years of education: Not on file   • Highest education level: Not on file   Occupational History   • Not on file   Social Needs   • Financial resource strain: Not on file   • Food insecurity:     Worry: Not on file     Inability: Not on file   • Transportation needs:     Medical: Not on file     Non-medical: Not on file   Tobacco Use   • Smoking status: Current Every Day Smoker     Packs/day: 0.25     Years: 30.00     Pack years: 7.50     Types: Cigars   • Smokeless tobacco: Never Used   • Tobacco comment: 3/4 ppd x 30 years   Substance and Sexual Activity   • Alcohol use: No   • Drug use: No   • Sexual activity: Yes     Partners: Female   Lifestyle   • Physical activity:     Days per week: Not on file     Minutes per session: Not on file   • Stress: Not on file   Relationships   • Social connections:     Talks on phone: Not on file     Gets together: Not on file     Attends [employer] service: Not on file     Active member of club or organization: Not on file     Attends meetings of clubs or organizations: Not on file     Relationship status: Not on file   • Intimate partner violence:     Fear of current or ex partner: Not on file     Emotionally abused: Not on file     Physically abused: Not on file     Forced sexual activity: Not on file   Other Topics Concern   • Not on file   Social History Narrative    ** Merged History Encounter **            Past Surgical History:   Procedure Laterality Date   • GASTROSCOPY WITH BIOPSY  11/7/2010    Performed by MARY SAENZ at ENDOSCOPY [facility]   • GASTROSCOPY WITH BIOPSY  9/3/2010    Performed by DIONNE KELLEY at ENDOSCOPY [facility]   • OTHER ORTHOPEDIC SURGERY  2000    left ankle repair, total fusion.   • OTHER ABDOMINAL SURGERY  1982    hernia repair       Allergies as of 12/05/2019 - Reviewed 12/05/2019   Allergen Reaction  Noted   • Hctz  05/24/2012   • Nsaids  05/11/2018   • Other drug Nausea 04/22/2010   • Vicodin [hydrocodone-acetaminophen] Vomiting 11/12/2007   • Vicodin [hydrocodone-acetaminophen]  05/11/2018   • Amoxicillin Nausea 04/22/2010        ROS: Denies Chest pain, SOB, LE edema.    /80 (BP Location: Right arm, Patient Position: Sitting)   Pulse 98   Temp 36.2 °C (97.1 °F)   Ht 1.829 m (6')   Wt 92.5 kg (204 lb)   SpO2 98%   BMI 27.67 kg/m²     Physical Exam:  Gen:         Alert and oriented, No apparent distress.  Neck:        No Lymphadenopathy or Bruits.  Lungs:     Clear to auscultation bilaterally  CV:          Regular rate and rhythm. No murmurs, rubs or gallops.  Abd:         Soft non tender, non distended. Normal active bowel sounds.  No  Hepatosplenomegaly, No pulsatile masses.                   Ext:          No clubbing, cyanosis, edema.      Assessment and Plan.   54 y.o. male with the following issues.    1. Medicare annual wellness visit, subsequent  Discussed healthy lifestyle habits as well as screening regimens.    2. Neck pain  Placing back on Celebrex and baclofen cautioned about side effects.    3. Schizo affective schizophrenia (HCC)  Follow along with psychiatry    4. IGT (impaired glucose tolerance)  Repeating test in coming months    5. HTN (hypertension), benign  Currently well controlled, Discuss diet, exercise and salt restriction.  No change to medication therapy.    6. Gastroesophageal reflux disease without esophagitis  Stable    7. Acquired hypothyroidism  Continue to follow    8. Hepatitis C virus infection without hepatic coma, unspecified chronicity  That is post treatment        Referrals offered: Community-based lifestyle interventions to reduce health risks and promote self-management and wellness, fall prevention, nutrition, physical activity, tobacco-use cessation, weight loss, and mental health services as per orders if indicated.    Discussion today about general  wellness and lifestyle habits:    · Prevent falls and reduce trip hazards; Cautioned about securing or removing rugs.  · Have a working fire alarm and carbon monoxide detector;   · Engage in regular physical activity and social activities

## 2019-12-09 RX ORDER — CELECOXIB 200 MG/1
200 CAPSULE ORAL 2 TIMES DAILY
Qty: 60 CAP | Refills: 11 | Status: SHIPPED
Start: 2019-12-09 | End: 2020-07-31

## 2019-12-09 RX ORDER — BACLOFEN 5 MG/1
5 TABLET ORAL 2 TIMES DAILY
Qty: 60 TAB | Refills: 11 | Status: SHIPPED | OUTPATIENT
Start: 2019-12-09 | End: 2020-05-05

## 2019-12-10 ENCOUNTER — HOSPITAL ENCOUNTER (OUTPATIENT)
Dept: LAB | Facility: MEDICAL CENTER | Age: 54
End: 2019-12-10
Attending: INTERNAL MEDICINE
Payer: MEDICARE

## 2019-12-10 DIAGNOSIS — R73.02 IGT (IMPAIRED GLUCOSE TOLERANCE): ICD-10-CM

## 2019-12-10 DIAGNOSIS — Z13.6 SCREENING FOR CARDIOVASCULAR CONDITION: ICD-10-CM

## 2019-12-10 DIAGNOSIS — E03.9 ACQUIRED HYPOTHYROIDISM: ICD-10-CM

## 2019-12-10 LAB
ALBUMIN SERPL BCP-MCNC: 4.6 G/DL (ref 3.2–4.9)
ALBUMIN/GLOB SERPL: 1.6 G/DL
ALP SERPL-CCNC: 62 U/L (ref 30–99)
ALT SERPL-CCNC: 12 U/L (ref 2–50)
ANION GAP SERPL CALC-SCNC: 10 MMOL/L (ref 0–11.9)
AST SERPL-CCNC: 25 U/L (ref 12–45)
BILIRUB SERPL-MCNC: 0.5 MG/DL (ref 0.1–1.5)
BUN SERPL-MCNC: 6 MG/DL (ref 8–22)
CALCIUM SERPL-MCNC: 9.4 MG/DL (ref 8.5–10.5)
CHLORIDE SERPL-SCNC: 97 MMOL/L (ref 96–112)
CHOLEST SERPL-MCNC: 125 MG/DL (ref 100–199)
CO2 SERPL-SCNC: 23 MMOL/L (ref 20–33)
CREAT SERPL-MCNC: 0.88 MG/DL (ref 0.5–1.4)
GLOBULIN SER CALC-MCNC: 2.9 G/DL (ref 1.9–3.5)
GLUCOSE SERPL-MCNC: 74 MG/DL (ref 65–99)
HDLC SERPL-MCNC: 43 MG/DL
LDLC SERPL CALC-MCNC: 67 MG/DL
POTASSIUM SERPL-SCNC: 4.2 MMOL/L (ref 3.6–5.5)
PROT SERPL-MCNC: 7.5 G/DL (ref 6–8.2)
SODIUM SERPL-SCNC: 130 MMOL/L (ref 135–145)
TRIGL SERPL-MCNC: 74 MG/DL (ref 0–149)
TSH SERPL DL<=0.005 MIU/L-ACNC: 1.03 UIU/ML (ref 0.38–5.33)

## 2019-12-10 PROCEDURE — 36415 COLL VENOUS BLD VENIPUNCTURE: CPT

## 2019-12-10 PROCEDURE — 80053 COMPREHEN METABOLIC PANEL: CPT

## 2019-12-10 PROCEDURE — 83036 HEMOGLOBIN GLYCOSYLATED A1C: CPT | Mod: GA

## 2019-12-10 PROCEDURE — 80061 LIPID PANEL: CPT

## 2019-12-10 PROCEDURE — 84443 ASSAY THYROID STIM HORMONE: CPT

## 2019-12-11 LAB
EST. AVERAGE GLUCOSE BLD GHB EST-MCNC: 120 MG/DL
HBA1C MFR BLD: 5.8 % (ref 0–5.6)

## 2020-01-06 RX ORDER — FAMOTIDINE 20 MG/1
TABLET, FILM COATED ORAL
Qty: 180 TAB | Refills: 10 | Status: SHIPPED
Start: 2020-01-06 | End: 2020-07-31

## 2020-03-11 ENCOUNTER — OFFICE VISIT (OUTPATIENT)
Dept: MEDICAL GROUP | Facility: MEDICAL CENTER | Age: 55
End: 2020-03-11
Payer: MEDICARE

## 2020-03-11 VITALS
OXYGEN SATURATION: 97 % | HEIGHT: 72 IN | HEART RATE: 96 BPM | TEMPERATURE: 97.1 F | BODY MASS INDEX: 26.95 KG/M2 | SYSTOLIC BLOOD PRESSURE: 116 MMHG | DIASTOLIC BLOOD PRESSURE: 68 MMHG | WEIGHT: 199 LBS

## 2020-03-11 DIAGNOSIS — R73.02 IGT (IMPAIRED GLUCOSE TOLERANCE): ICD-10-CM

## 2020-03-11 DIAGNOSIS — G89.29 CHRONIC RIGHT SHOULDER PAIN: ICD-10-CM

## 2020-03-11 DIAGNOSIS — M25.511 CHRONIC RIGHT SHOULDER PAIN: ICD-10-CM

## 2020-03-11 DIAGNOSIS — J02.9 SORE THROAT: ICD-10-CM

## 2020-03-11 DIAGNOSIS — E03.9 ACQUIRED HYPOTHYROIDISM: ICD-10-CM

## 2020-03-11 DIAGNOSIS — Z13.6 SCREENING FOR CARDIOVASCULAR CONDITION: ICD-10-CM

## 2020-03-11 LAB
INT CON NEG: NEGATIVE
INT CON POS: POSITIVE
S PYO AG THROAT QL: NEGATIVE

## 2020-03-11 PROCEDURE — 99214 OFFICE O/P EST MOD 30 MIN: CPT | Performed by: INTERNAL MEDICINE

## 2020-03-11 PROCEDURE — 87880 STREP A ASSAY W/OPTIC: CPT | Performed by: INTERNAL MEDICINE

## 2020-03-11 ASSESSMENT — FIBROSIS 4 INDEX: FIB4 SCORE: 1.77

## 2020-03-11 NOTE — PROGRESS NOTES
CC: Follow-up chronic pain, sore throat, blood sugars and thyroid.                                                                                                                                      HPI:   Haile presents today with the following.    1. Chronic right shoulder pain  Has chronic neck and back pain with multiple other arthralgias maintained on Celebrex not completely well controlled.  Difficulty with transportation and coordination of care he does have his  with him today and they suggest that she becomes his contact.  He is wanting to seeing pain specialist.    2. Sore throat  Complaining of 1 day of sore throat no fevers or chills difficulty with swallowing no other upper respiratory symptoms.    3. IGT (impaired glucose tolerance)  Sugars well controlled last check will be due in 3 months.    4. Acquired hypothyroidism  Contain on thyroid medications well replaced at last check    5. Screening for cardiovascular condition        Patient Active Problem List    Diagnosis Date Noted   • Schizo affective schizophrenia (HCC) 04/22/2010     Priority: High   • Hepatitis C 05/24/2010     Priority: Medium   • HTN (hypertension), benign 04/22/2010     Priority: Medium   • Lung calcification 05/11/2018     Priority: Low   • GERD (gastroesophageal reflux disease) 07/14/2013     Priority: Low   • Acquired hypothyroidism 05/28/2019   • IGT (impaired glucose tolerance) 03/21/2017   • Encounter for long-term (current) use of other medications 01/29/2015       Current Outpatient Medications   Medication Sig Dispense Refill   • famotidine (PEPCID) 20 MG Tab TAKE 1 TABLET BY MOUTH TWICE A  Tab 10   • Baclofen 5 MG Tab Take 5 mg by mouth 2 Times a Day. TAKE 1 TABLET BY MOUTH TWICE A DAY 60 Tab 11   • celecoxib (CELEBREX) 200 MG Cap Take 1 Cap by mouth 2 times a day. 60 Cap 11   • trihexyphenidyl (ARTANE) 2 MG Tab TAKE 1 TABLET BY MOUTH TWICE A DAY FOR DROOLING  1   • STOOL SOFTENER 100 MG Cap  TAKE 1 CAPSULE BY MOUTH TWICE A DAY 60 Cap 10   • levothyroxine (SYNTHROID) 50 MCG Tab TAKE 1 TABLET BY MOUTH EVERY MORNING ON AN EMPTY STOMACH 90 Tab 3   • tamsulosin (FLOMAX) 0.4 MG capsule TAKE 1 CAPSULE BY MOUTH ONE-HALF HOUR AFTER BREAKFAST 90 Cap 3   • metFORMIN (GLUCOPHAGE) 500 MG Tab TAKE 1 TABLET BY MOUTH EVERY MORNING 90 Tab 3   • ferrous sulfate 325 (65 Fe) MG tablet Take 1 Tab by mouth every day. 90 Tab 3   • benztropine (COGENTIN) 1 MG Tab Take 3 Tabs by mouth 2 times a day. 60 Tab    • divalproex ER (DEPAKOTE ER) 500 MG TABLET SR 24 HR Take 2 Tabs by mouth 2 Times a Day. 30 Tab 5   • olanzapine (ZYPREXA) 20 MG tablet 2 Times a Day.  5     No current facility-administered medications for this visit.          Allergies as of 03/11/2020 - Reviewed 03/11/2020   Allergen Reaction Noted   • Hctz  05/24/2012   • Nsaids  05/11/2018   • Other drug Nausea 04/22/2010   • Vicodin [hydrocodone-acetaminophen] Vomiting 11/12/2007   • Vicodin [hydrocodone-acetaminophen]  05/11/2018   • Amoxicillin Nausea 04/22/2010        ROS: Denies Chest pain, SOB, LE edema.    /68 (BP Location: Right arm, Patient Position: Sitting)   Pulse 96   Temp 36.2 °C (97.1 °F)   Ht 1.829 m (6')   Wt 90.3 kg (199 lb)   SpO2 97%   BMI 26.99 kg/m²     Physical Exam:  Gen:         Alert and oriented, No apparent distress.  Neck:        No Lymphadenopathy or Bruits.  Lungs:     Clear to auscultation bilaterally  CV:          Regular rate and rhythm. No murmurs, rubs or gallops.               Ext:          No clubbing, cyanosis, edema.      Assessment and Plan.   55 y.o. male with the following issues.    1. Chronic right shoulder pain  Have referred to pain specialist  - REFERRAL TO PAIN CLINIC    2. Sore throat  Likely viral in etiology is rapid strep is negative.  Recommend supportive care  - POCT Rapid Strep A    3. IGT (impaired glucose tolerance)  Recheck in June.  - HEMOGLOBIN A1C; Future    4. Acquired hypothyroidism  Given  recheck in June  - TSH; Future    5. Screening for cardiovascular condition    - Comp Metabolic Panel; Future  - Lipid Profile; Future

## 2020-04-13 ENCOUNTER — HOSPITAL ENCOUNTER (OUTPATIENT)
Dept: RADIOLOGY | Facility: MEDICAL CENTER | Age: 55
End: 2020-04-13
Attending: PHYSICAL MEDICINE & REHABILITATION
Payer: MEDICARE

## 2020-04-13 DIAGNOSIS — M25.511 PAIN AND SWELLING OF RIGHT SHOULDER: ICD-10-CM

## 2020-04-13 DIAGNOSIS — M54.40 BILATERAL LOW BACK PAIN WITH SCIATICA, SCIATICA LATERALITY UNSPECIFIED, UNSPECIFIED CHRONICITY: ICD-10-CM

## 2020-04-13 DIAGNOSIS — M25.411 PAIN AND SWELLING OF RIGHT SHOULDER: ICD-10-CM

## 2020-04-13 PROCEDURE — 72110 X-RAY EXAM L-2 SPINE 4/>VWS: CPT

## 2020-04-13 PROCEDURE — 73030 X-RAY EXAM OF SHOULDER: CPT | Mod: RT

## 2020-04-28 RX ORDER — FERROUS SULFATE 325(65) MG
325 TABLET ORAL DAILY
Qty: 90 TAB | Refills: 3 | Status: SHIPPED | OUTPATIENT
Start: 2020-04-28 | End: 2020-05-05

## 2020-04-29 DIAGNOSIS — R73.02 IGT (IMPAIRED GLUCOSE TOLERANCE): ICD-10-CM

## 2020-05-05 DIAGNOSIS — R73.02 IGT (IMPAIRED GLUCOSE TOLERANCE): ICD-10-CM

## 2020-05-05 RX ORDER — NAPROXEN 375 MG/1
TABLET ORAL
Qty: 60 TAB | Refills: 0 | Status: SHIPPED | OUTPATIENT
Start: 2020-05-05 | End: 2020-06-22

## 2020-05-05 RX ORDER — BACLOFEN 5 MG/1
TABLET ORAL
Qty: 56 TAB | Refills: 10 | Status: SHIPPED | OUTPATIENT
Start: 2020-05-05 | End: 2020-10-05 | Stop reason: SDUPTHER

## 2020-05-05 RX ORDER — FERROUS SULFATE 325(65) MG
TABLET ORAL
Qty: 28 TAB | Refills: 10 | Status: SHIPPED
Start: 2020-05-05 | End: 2020-07-31

## 2020-06-22 RX ORDER — NAPROXEN 375 MG/1
375 TABLET ORAL 2 TIMES DAILY WITH MEALS
Qty: 14 TAB | Refills: 0 | Status: SHIPPED | OUTPATIENT
Start: 2020-06-22 | End: 2020-06-22 | Stop reason: SDUPTHER

## 2020-06-22 RX ORDER — NAPROXEN 375 MG/1
375 TABLET ORAL 2 TIMES DAILY WITH MEALS
Qty: 14 TAB | Refills: 6 | Status: SHIPPED
Start: 2020-06-22 | End: 2020-07-31

## 2020-06-22 RX ORDER — TAMSULOSIN HYDROCHLORIDE 0.4 MG/1
0.4 CAPSULE ORAL DAILY
Qty: 28 CAP | Refills: 0 | Status: SHIPPED | OUTPATIENT
Start: 2020-06-22 | End: 2020-07-21

## 2020-07-07 ENCOUNTER — APPOINTMENT (OUTPATIENT)
Dept: RADIOLOGY | Facility: MEDICAL CENTER | Age: 55
End: 2020-07-07
Attending: EMERGENCY MEDICINE
Payer: MEDICARE

## 2020-07-07 ENCOUNTER — HOSPITAL ENCOUNTER (EMERGENCY)
Facility: MEDICAL CENTER | Age: 55
End: 2020-07-07
Attending: EMERGENCY MEDICINE
Payer: MEDICARE

## 2020-07-07 VITALS
HEART RATE: 73 BPM | SYSTOLIC BLOOD PRESSURE: 136 MMHG | WEIGHT: 195 LBS | TEMPERATURE: 99.4 F | BODY MASS INDEX: 25.84 KG/M2 | DIASTOLIC BLOOD PRESSURE: 84 MMHG | HEIGHT: 73 IN | OXYGEN SATURATION: 97 % | RESPIRATION RATE: 19 BRPM

## 2020-07-07 DIAGNOSIS — R07.9 ACUTE CHEST PAIN: ICD-10-CM

## 2020-07-07 DIAGNOSIS — K21.9 GASTROESOPHAGEAL REFLUX DISEASE, ESOPHAGITIS PRESENCE NOT SPECIFIED: ICD-10-CM

## 2020-07-07 LAB
ALBUMIN SERPL BCP-MCNC: 4.2 G/DL (ref 3.2–4.9)
ALBUMIN/GLOB SERPL: 1.5 G/DL
ALP SERPL-CCNC: 63 U/L (ref 30–99)
ALT SERPL-CCNC: 12 U/L (ref 2–50)
ANION GAP SERPL CALC-SCNC: 17 MMOL/L (ref 7–16)
AST SERPL-CCNC: 26 U/L (ref 12–45)
BASOPHILS # BLD AUTO: 0.5 % (ref 0–1.8)
BASOPHILS # BLD: 0.02 K/UL (ref 0–0.12)
BILIRUB SERPL-MCNC: 0.3 MG/DL (ref 0.1–1.5)
BUN SERPL-MCNC: 5 MG/DL (ref 8–22)
CALCIUM SERPL-MCNC: 9.4 MG/DL (ref 8.5–10.5)
CHLORIDE SERPL-SCNC: 96 MMOL/L (ref 96–112)
CO2 SERPL-SCNC: 19 MMOL/L (ref 20–33)
CREAT SERPL-MCNC: 0.94 MG/DL (ref 0.5–1.4)
EKG IMPRESSION: NORMAL
EOSINOPHIL # BLD AUTO: 0.06 K/UL (ref 0–0.51)
EOSINOPHIL NFR BLD: 1.6 % (ref 0–6.9)
ERYTHROCYTE [DISTWIDTH] IN BLOOD BY AUTOMATED COUNT: 45.5 FL (ref 35.9–50)
GLOBULIN SER CALC-MCNC: 2.8 G/DL (ref 1.9–3.5)
GLUCOSE SERPL-MCNC: 94 MG/DL (ref 65–99)
HCT VFR BLD AUTO: 39.5 % (ref 42–52)
HGB BLD-MCNC: 13.5 G/DL (ref 14–18)
IMM GRANULOCYTES # BLD AUTO: 0 K/UL (ref 0–0.11)
IMM GRANULOCYTES NFR BLD AUTO: 0 % (ref 0–0.9)
LIPASE SERPL-CCNC: 24 U/L (ref 11–82)
LYMPHOCYTES # BLD AUTO: 1.58 K/UL (ref 1–4.8)
LYMPHOCYTES NFR BLD: 42.9 % (ref 22–41)
MCH RBC QN AUTO: 31 PG (ref 27–33)
MCHC RBC AUTO-ENTMCNC: 34.2 G/DL (ref 33.7–35.3)
MCV RBC AUTO: 90.8 FL (ref 81.4–97.8)
MONOCYTES # BLD AUTO: 0.38 K/UL (ref 0–0.85)
MONOCYTES NFR BLD AUTO: 10.3 % (ref 0–13.4)
NEUTROPHILS # BLD AUTO: 1.64 K/UL (ref 1.82–7.42)
NEUTROPHILS NFR BLD: 44.7 % (ref 44–72)
NRBC # BLD AUTO: 0 K/UL
NRBC BLD-RTO: 0 /100 WBC
PLATELET # BLD AUTO: 170 K/UL (ref 164–446)
PMV BLD AUTO: 9.5 FL (ref 9–12.9)
POTASSIUM SERPL-SCNC: 4.2 MMOL/L (ref 3.6–5.5)
PROT SERPL-MCNC: 7 G/DL (ref 6–8.2)
RBC # BLD AUTO: 4.35 M/UL (ref 4.7–6.1)
SODIUM SERPL-SCNC: 132 MMOL/L (ref 135–145)
TROPONIN T SERPL-MCNC: 8 NG/L (ref 6–19)
WBC # BLD AUTO: 3.7 K/UL (ref 4.8–10.8)

## 2020-07-07 PROCEDURE — 93005 ELECTROCARDIOGRAM TRACING: CPT | Performed by: EMERGENCY MEDICINE

## 2020-07-07 PROCEDURE — 71045 X-RAY EXAM CHEST 1 VIEW: CPT

## 2020-07-07 PROCEDURE — 85025 COMPLETE CBC W/AUTO DIFF WBC: CPT

## 2020-07-07 PROCEDURE — 83690 ASSAY OF LIPASE: CPT

## 2020-07-07 PROCEDURE — 80053 COMPREHEN METABOLIC PANEL: CPT

## 2020-07-07 PROCEDURE — 99284 EMERGENCY DEPT VISIT MOD MDM: CPT

## 2020-07-07 PROCEDURE — 36415 COLL VENOUS BLD VENIPUNCTURE: CPT

## 2020-07-07 PROCEDURE — 84484 ASSAY OF TROPONIN QUANT: CPT

## 2020-07-07 ASSESSMENT — FIBROSIS 4 INDEX: FIB4 SCORE: 1.77

## 2020-07-07 NOTE — ED NOTES
"Pt DAVID MORILLO from street. Pt reports having CP this afternoon after walking around 7 miles. Pt left group home earlier, went to grocery store, then drank 1 [employer]. Pt reports he was walking home when he started having chest pain and generalized weakness. Pt reports [employer] employees called for help. Pt reports chest pain, 7/10, currently. Pt denies cough, fever, chills, N/V/D. Pt reports hx MI \"about 7-10 years ago\".   "

## 2020-07-08 NOTE — ED PROVIDER NOTES
"ED Provider Note    CHIEF COMPLAINT(1/4)  Chief Complaint   Patient presents with   • Chest Pain       HPI  Haile Chavez is a 55 y.o. male with schizoaffective disorder, HTN, who presents with acute onset chest pain while he was walking today. He had walked 7 miles, ate lunch and drank 1 beer and was walking home when he had sudden onset of of epigastric pain, squeezing, non radiating, 6/10 that was so severe at the time he needed to bend over and walked to nearby grocery store as he knew he could not walk the 5 miles to his group home. He denies BRIGHT, pain not aggrevated by exertion, had nausea w/o vomiting, without increased diaphoresis.   He felt he was dehydrated due to walking in the heat, states he is unable to bring water with him due to his \"chronic UTI\", if he drinks water he has to urinate almost immediately. He had difficulty breathing at the time which is not new and not worse. Pain had resolved once taking a nap in the ambulance ~30 minutes in duration.     He states he has had chest pain before, 4 days prior while in the backyard of his group home, similar quality, \"vice in his chest\" the voices in his head aggravated the pain, resolved spontaneously after <10 min. He states he had an MI in 1985 and does not see a cardiologist.     The pain is now completely resolved and is only complaining of headache, mild, he attributes to dehydration.         REVIEW OF SYSTEMS(1/10)  Pertinent positives include: diaphoresis, nausea  Pertinent negatives include: BRIGHT, numbness, weakness   All other systems are negative.     PAST MEDICAL HISTORY(PFS1,2)  Past Medical History:   Diagnosis Date   • Angina    • Diabetes (HCC)    • Hepatitis C 5/24/1996   • HTN (hypertension), benign 4/22/2008   • Psychiatric disorder     Schizoaffective   • PTSD (post-traumatic stress disorder)    • Schizo affective schizophrenia (HCC) 4/22/2010   • Schizophrenia, schizo-affective type (HCC)    • Tuberculosis 4/22/1992       FAMILY " HISTORY  Family History   Problem Relation Age of Onset   • Genetic Disorder Neg Hx        SOCIAL HISTORY  Social History     Tobacco Use   • Smoking status: Current Every Day Smoker     Packs/day: 0.25     Years: 30.00     Pack years: 7.50     Types: Cigars   • Smokeless tobacco: Never Used   • Tobacco comment: 3/4 ppd x 30 years   Substance Use Topics   • Alcohol use: No   • Drug use: No     Social History     Substance and Sexual Activity   Drug Use No       SURGICAL HISTORY  Past Surgical History:   Procedure Laterality Date   • GASTROSCOPY WITH BIOPSY  11/7/2010    Performed by MARY SAENZ at ENDOSCOPY [facility]S   • GASTROSCOPY WITH BIOPSY  9/3/2010    Performed by DIONNE KELLEY at ENDOSCOPY [facility] ORS   • OTHER ORTHOPEDIC SURGERY  2000    left ankle repair, total fusion.   • OTHER ABDOMINAL SURGERY  1982    hernia repair       CURRENT MEDICATIONS  Home Medications     Reviewed by Windy Colin R.N. (Registered Nurse) on 07/07/20 at 1639  Med List Status: Unable to Obtain   Medication Last Dose Status   Baclofen 5 MG Tab  Active   benztropine (COGENTIN) 1 MG Tab  Active   celecoxib (CELEBREX) 200 MG Cap  Active   divalproex ER (DEPAKOTE ER) 500 MG TABLET SR 24 HR  Active   famotidine (PEPCID) 20 MG Tab  Active   ferrous sulfate 325 (65 Fe) MG tablet  Active   levothyroxine (SYNTHROID) 50 MCG Tab  Active   metFORMIN (GLUCOPHAGE) 500 MG Tab  Active   naproxen (NAPROSYN) 375 MG Tab  Active   olanzapine (ZYPREXA) 20 MG tablet  Active   STOOL SOFTENER 100 MG Cap  Active   tamsulosin (FLOMAX) 0.4 MG capsule  Active   trihexyphenidyl (ARTANE) 2 MG Tab  Active                ALLERGIES  Allergies   Allergen Reactions   • Hctz      Hyponatremia     • Nsaids    • Other Drug Nausea     All anti-inflammitories,  Headache and body aches.   • Vicodin [Hydrocodone-Acetaminophen] Vomiting     Headaches   • Vicodin [Hydrocodone-Acetaminophen]    • Amoxicillin Nausea     Headache and body aches  "      PHYSICAL EXAM  VITAL SIGNS: /93   Pulse 85   Temp 37.4 °C (99.4 °F) (Temporal)   Resp 19   Ht 1.854 m (6' 1\")   Wt 88.5 kg (195 lb)   SpO2 95%   BMI 25.73 kg/m²  Reviewed and noted  Constitutional: Well developed, Well nourished,in no acute distress, initially difficult to arouse, then appropriately awake.  HENT: Normocephalic, atraumatic, bilateral external ears normal, oropharynx moist, No exudates or erythema. Poor dentition  Eyes: PERRLA, conjunctiva pink, no scleral icterus.   Cardiovascular: s1/s2 RRR no MGR, no thrills  Respiratory: CTABL, no adventitious noises.  Gastrointestinal: soft, ND, NT to deep palpation, negative ruiz's, no pain at mc burneys.  Skin: No erythema, no rash.   Genitourinary:  No costovertebral angle tenderness.   Neurologic: Alert & oriented x 3, cranial nerves 2-12 intact by passive exam.  No focal deficit noted, shuffled gait  Psychiatric: Affect flat, Judgment normal, Mood normal, describes auditory hallucinations ridiculing him and \"his achievements\" denies HI/SI.     DIFFERENTIAL DIAGNOSIS:  ACS, pancreatitis, aortic dissection, GERD, acute cholecystits    EKG  Per my read, nt acute ST changes, t wave abnormalities or q waves, isolated V2 j point, no changes from previous  EKG on file       RADIOLOGY/PROCEDURES  DX-CHEST-PORTABLE (1 VIEW)   Final Result      Ill-defined airspace opacities in the right lower lobe, likely secondary to calcified pleural plaques.   No definite new airspace opacity seen.            LABORATORY: Reviewed as below.  Admission on 2020   Component Date Value Ref Range Status   • Report 2020    Final                    Value:[facility] Emergency Dept.    Test Date:  2020  Pt Name:    JAYMIE SINGH                Department: ER  MRN:        [id number]                      Room:       BL 21  Gender:     Male                         Technician: [id number]  :        1965                   Requested By:ER " TRIAGE PROTOCOL  Order #:    [id number]                    Reading MD: HALLIE BRAXTON MD    Measurements  Intervals                                Axis  Rate:       77                           P:          36  MD:         180                          QRS:        49  QRSD:       84                           T:          23  QT:         352  QTc:        399    Interpretive Statements  12 Lead EKG interpreted by me to show: -- Rate 77 -- Rhythm: Normal sinus  rhythm  -- Axis: Normal -- MD and QRS Intervals: Normal -- T waves: No acute changes  --  ST segments: No acute changes -- Ectopy: None. My impression of this EKG:  Does  not indicate acute ischemia at this time.  No significant c                          hange compared to  5/12/2018  Electronically Signed On 7-7-2020 18:38:00 PDT by HALLIE BRAXTON MD     • WBC 07/07/2020 3.7* 4.8 - 10.8 K/uL Final   • RBC 07/07/2020 4.35* 4.70 - 6.10 M/uL Final   • Hemoglobin 07/07/2020 13.5* 14.0 - 18.0 g/dL Final   • Hematocrit 07/07/2020 39.5* 42.0 - 52.0 % Final   • MCV 07/07/2020 90.8  81.4 - 97.8 fL Final   • MCH 07/07/2020 31.0  27.0 - 33.0 pg Final   • MCHC 07/07/2020 34.2  33.7 - 35.3 g/dL Final   • RDW 07/07/2020 45.5  35.9 - 50.0 fL Final   • Platelet Count 07/07/2020 170  164 - 446 K/uL Final   • MPV 07/07/2020 9.5  9.0 - 12.9 fL Final   • Neutrophils-Polys 07/07/2020 44.70  44.00 - 72.00 % Final   • Lymphocytes 07/07/2020 42.90* 22.00 - 41.00 % Final   • Monocytes 07/07/2020 10.30  0.00 - 13.40 % Final   • Eosinophils 07/07/2020 1.60  0.00 - 6.90 % Final   • Basophils 07/07/2020 0.50  0.00 - 1.80 % Final   • Immature Granulocytes 07/07/2020 0.00  0.00 - 0.90 % Final   • Nucleated RBC 07/07/2020 0.00  /100 WBC Final   • Neutrophils (Absolute) 07/07/2020 1.64* 1.82 - 7.42 K/uL Final    Includes immature neutrophils, if present.   • Lymphs (Absolute) 07/07/2020 1.58  1.00 - 4.80 K/uL Final   • Monos (Absolute) 07/07/2020 0.38  0.00 - 0.85 K/uL Final   • Eos  (Absolute) 07/07/2020 0.06  0.00 - 0.51 K/uL Final   • Baso (Absolute) 07/07/2020 0.02  0.00 - 0.12 K/uL Final   • Immature Granulocytes (abs) 07/07/2020 0.00  0.00 - 0.11 K/uL Final   • NRBC (Absolute) 07/07/2020 0.00  K/uL Final   • Sodium 07/07/2020 132* 135 - 145 mmol/L Final   • Potassium 07/07/2020 4.2  3.6 - 5.5 mmol/L Final   • Chloride 07/07/2020 96  96 - 112 mmol/L Final   • Co2 07/07/2020 19* 20 - 33 mmol/L Final   • Anion Gap 07/07/2020 17.0* 7.0 - 16.0 Final   • Glucose 07/07/2020 94  65 - 99 mg/dL Final   • Bun 07/07/2020 5* 8 - 22 mg/dL Final   • Creatinine 07/07/2020 0.94  0.50 - 1.40 mg/dL Final   • Calcium 07/07/2020 9.4  8.5 - 10.5 mg/dL Final   • AST(SGOT) 07/07/2020 26  12 - 45 U/L Final   • ALT(SGPT) 07/07/2020 12  2 - 50 U/L Final   • Alkaline Phosphatase 07/07/2020 63  30 - 99 U/L Final   • Total Bilirubin 07/07/2020 0.3  0.1 - 1.5 mg/dL Final   • Albumin 07/07/2020 4.2  3.2 - 4.9 g/dL Final   • Total Protein 07/07/2020 7.0  6.0 - 8.2 g/dL Final   • Globulin 07/07/2020 2.8  1.9 - 3.5 g/dL Final   • A-G Ratio 07/07/2020 1.5  g/dL Final   • Lipase 07/07/2020 24  11 - 82 U/L Final   • Troponin T 07/07/2020 8  6 - 19 ng/L Final    Comment: As of July 9th 2019 at 0900, the Troponin I test has been replaced with the  Ultra High Sensitivity (Gen 5) Troponin T test.  Please note new analyte,  methodology, and reference range.  The Ultra High Sensitivity Troponin T test has a reference range for  positive troponins that follows the recommendation of the American College  of Cardiology (ACC) committee in conjunction with the 99th percentile  reference population. Normal range: 6-19 ng/L     • GFR If  07/07/2020 >60  >60 mL/min/1.73 m 2 Final   • GFR If Non  07/07/2020 >60  >60 mL/min/1.73 m 2 Final         INTERVENTIONS:  None    COURSE & MEDICAL DECISION MAKING  Discussed with Dr. Skelton.    Patient with ~30 min of squeezing epigastric pain after meal, likely GERD as  post-prandial and after alcoholic beverage and + h/o. Consideration for ACS however EKG without significantly abnormalities, similar to previous EKG, troponin negative, HEART score 3 (age and comorbities) 0.9-1.7% of MACE. Not an aortic dissection, as pain has resolved, no widening of mediastinum on XRAY. Pancreatitis considered due to location, lipase wnl. Abdomen is soft, benign, very low suspicion for acute intraabdominal process such as appendicitis or cholecystitis.     Review nursing notes and vital signs a final time 7:23 PM    PLAN:  Discharge to group home, continue omeprazole, follow up with primary care physician.   Patient advised to return immediately to the Emergency Department if you experience continuing or worsening discomfort in your chest, any difficulty breathing, back pain, abdominal pain, or any other new or worsening symptoms.      CONDITION: Stable    FINAL IMPRESSION  Acute chest pain  GERD    Electronically signed by: Edie Allen M.D., 7/7/2020 6:06 PM

## 2020-07-08 NOTE — ED NOTES
Pt ambulates to restroom with steady, upright gait without assistance, instructed on need to provide urine specimen.

## 2020-07-14 DIAGNOSIS — R73.02 IGT (IMPAIRED GLUCOSE TOLERANCE): ICD-10-CM

## 2020-07-14 DIAGNOSIS — R10.32 LEFT INGUINAL PAIN: ICD-10-CM

## 2020-07-17 ENCOUNTER — TELEPHONE (OUTPATIENT)
Dept: MEDICAL GROUP | Facility: MEDICAL CENTER | Age: 55
End: 2020-07-17

## 2020-07-17 DIAGNOSIS — R73.02 IGT (IMPAIRED GLUCOSE TOLERANCE): ICD-10-CM

## 2020-07-17 NOTE — TELEPHONE ENCOUNTER
1. Caller Name: Shirlene                        Call Back Number: [phone]      How would the patient prefer to be contacted with a response: Phone call do NOT leave a detailed message    Caregiver called and patient's previous rx for Metformin was for once daily. The new rx is for twice daily. They are wondering if that is correct and if so, why there was a change in medication. Please advise.

## 2020-07-21 RX ORDER — TAMSULOSIN HYDROCHLORIDE 0.4 MG/1
CAPSULE ORAL
Qty: 30 CAP | Refills: 10 | Status: SHIPPED | OUTPATIENT
Start: 2020-07-21 | End: 2020-07-22 | Stop reason: SDUPTHER

## 2020-07-21 RX ORDER — LEVOTHYROXINE SODIUM 0.05 MG/1
TABLET ORAL
Qty: 28 TAB | Refills: 10 | Status: SHIPPED
Start: 2020-07-21 | End: 2020-07-31

## 2020-07-22 RX ORDER — TAMSULOSIN HYDROCHLORIDE 0.4 MG/1
0.4 CAPSULE ORAL DAILY
Qty: 30 CAP | Refills: 10 | Status: SHIPPED
Start: 2020-07-22 | End: 2020-07-30

## 2020-07-29 ENCOUNTER — HOSPITAL ENCOUNTER (EMERGENCY)
Facility: MEDICAL CENTER | Age: 55
End: 2020-07-29
Attending: EMERGENCY MEDICINE
Payer: MEDICARE

## 2020-07-29 ENCOUNTER — APPOINTMENT (OUTPATIENT)
Dept: RADIOLOGY | Facility: MEDICAL CENTER | Age: 55
End: 2020-07-29
Attending: EMERGENCY MEDICINE
Payer: MEDICARE

## 2020-07-29 VITALS
BODY MASS INDEX: 28.44 KG/M2 | RESPIRATION RATE: 20 BRPM | HEART RATE: 74 BPM | DIASTOLIC BLOOD PRESSURE: 104 MMHG | SYSTOLIC BLOOD PRESSURE: 156 MMHG | WEIGHT: 210 LBS | OXYGEN SATURATION: 98 % | HEIGHT: 72 IN | TEMPERATURE: 98.2 F

## 2020-07-29 DIAGNOSIS — R07.9 CHEST PAIN, UNSPECIFIED TYPE: ICD-10-CM

## 2020-07-29 LAB
ALBUMIN SERPL BCP-MCNC: 4.3 G/DL (ref 3.2–4.9)
ALBUMIN/GLOB SERPL: 1.7 G/DL
ALP SERPL-CCNC: 64 U/L (ref 30–99)
ALT SERPL-CCNC: 13 U/L (ref 2–50)
ANION GAP SERPL CALC-SCNC: 15 MMOL/L (ref 7–16)
AST SERPL-CCNC: 23 U/L (ref 12–45)
BASOPHILS # BLD AUTO: 0.6 % (ref 0–1.8)
BASOPHILS # BLD: 0.03 K/UL (ref 0–0.12)
BILIRUB SERPL-MCNC: 0.3 MG/DL (ref 0.1–1.5)
BUN SERPL-MCNC: 5 MG/DL (ref 8–22)
CALCIUM SERPL-MCNC: 8.8 MG/DL (ref 8.5–10.5)
CHLORIDE SERPL-SCNC: 87 MMOL/L (ref 96–112)
CO2 SERPL-SCNC: 20 MMOL/L (ref 20–33)
COVID ORDER STATUS COVID19: NORMAL
CREAT SERPL-MCNC: 0.77 MG/DL (ref 0.5–1.4)
EKG IMPRESSION: NORMAL
EOSINOPHIL # BLD AUTO: 0.07 K/UL (ref 0–0.51)
EOSINOPHIL NFR BLD: 1.5 % (ref 0–6.9)
ERYTHROCYTE [DISTWIDTH] IN BLOOD BY AUTOMATED COUNT: 40.3 FL (ref 35.9–50)
GLOBULIN SER CALC-MCNC: 2.5 G/DL (ref 1.9–3.5)
GLUCOSE SERPL-MCNC: 87 MG/DL (ref 65–99)
HCT VFR BLD AUTO: 37.2 % (ref 42–52)
HGB BLD-MCNC: 13.3 G/DL (ref 14–18)
IMM GRANULOCYTES # BLD AUTO: 0.01 K/UL (ref 0–0.11)
IMM GRANULOCYTES NFR BLD AUTO: 0.2 % (ref 0–0.9)
LYMPHOCYTES # BLD AUTO: 1.91 K/UL (ref 1–4.8)
LYMPHOCYTES NFR BLD: 40.9 % (ref 22–41)
MCH RBC QN AUTO: 31.1 PG (ref 27–33)
MCHC RBC AUTO-ENTMCNC: 35.8 G/DL (ref 33.7–35.3)
MCV RBC AUTO: 87.1 FL (ref 81.4–97.8)
MONOCYTES # BLD AUTO: 0.44 K/UL (ref 0–0.85)
MONOCYTES NFR BLD AUTO: 9.4 % (ref 0–13.4)
NEUTROPHILS # BLD AUTO: 2.21 K/UL (ref 1.82–7.42)
NEUTROPHILS NFR BLD: 47.4 % (ref 44–72)
NRBC # BLD AUTO: 0 K/UL
NRBC BLD-RTO: 0 /100 WBC
PLATELET # BLD AUTO: 153 K/UL (ref 164–446)
PMV BLD AUTO: 9.7 FL (ref 9–12.9)
POTASSIUM SERPL-SCNC: 4.5 MMOL/L (ref 3.6–5.5)
PROT SERPL-MCNC: 6.8 G/DL (ref 6–8.2)
RBC # BLD AUTO: 4.27 M/UL (ref 4.7–6.1)
SODIUM SERPL-SCNC: 122 MMOL/L (ref 135–145)
TROPONIN T SERPL-MCNC: 8 NG/L (ref 6–19)
WBC # BLD AUTO: 4.7 K/UL (ref 4.8–10.8)

## 2020-07-29 PROCEDURE — U0003 INFECTIOUS AGENT DETECTION BY NUCLEIC ACID (DNA OR RNA); SEVERE ACUTE RESPIRATORY SYNDROME CORONAVIRUS 2 (SARS-COV-2) (CORONAVIRUS DISEASE [COVID-19]), AMPLIFIED PROBE TECHNIQUE, MAKING USE OF HIGH THROUGHPUT TECHNOLOGIES AS DESCRIBED BY CMS-2020-01-R: HCPCS

## 2020-07-29 PROCEDURE — 93005 ELECTROCARDIOGRAM TRACING: CPT

## 2020-07-29 PROCEDURE — 99284 EMERGENCY DEPT VISIT MOD MDM: CPT

## 2020-07-29 PROCEDURE — 93005 ELECTROCARDIOGRAM TRACING: CPT | Performed by: EMERGENCY MEDICINE

## 2020-07-29 PROCEDURE — 85025 COMPLETE CBC W/AUTO DIFF WBC: CPT

## 2020-07-29 PROCEDURE — C9803 HOPD COVID-19 SPEC COLLECT: HCPCS | Performed by: EMERGENCY MEDICINE

## 2020-07-29 PROCEDURE — 84484 ASSAY OF TROPONIN QUANT: CPT

## 2020-07-29 PROCEDURE — 36415 COLL VENOUS BLD VENIPUNCTURE: CPT

## 2020-07-29 PROCEDURE — 80053 COMPREHEN METABOLIC PANEL: CPT

## 2020-07-29 PROCEDURE — 71045 X-RAY EXAM CHEST 1 VIEW: CPT

## 2020-07-29 ASSESSMENT — FIBROSIS 4 INDEX: FIB4 SCORE: 2.52

## 2020-07-30 ENCOUNTER — HOSPITAL ENCOUNTER (EMERGENCY)
Facility: MEDICAL CENTER | Age: 55
End: 2020-07-30
Attending: EMERGENCY MEDICINE | Admitting: EMERGENCY MEDICINE
Payer: MEDICARE

## 2020-07-30 ENCOUNTER — APPOINTMENT (OUTPATIENT)
Dept: RADIOLOGY | Facility: MEDICAL CENTER | Age: 55
End: 2020-07-30
Attending: EMERGENCY MEDICINE
Payer: MEDICARE

## 2020-07-30 VITALS
WEIGHT: 210 LBS | SYSTOLIC BLOOD PRESSURE: 140 MMHG | RESPIRATION RATE: 18 BRPM | DIASTOLIC BLOOD PRESSURE: 93 MMHG | HEART RATE: 70 BPM | HEIGHT: 72 IN | TEMPERATURE: 97.8 F | OXYGEN SATURATION: 98 % | BODY MASS INDEX: 28.44 KG/M2

## 2020-07-30 DIAGNOSIS — E87.1 HYPONATREMIA: ICD-10-CM

## 2020-07-30 DIAGNOSIS — R07.9 CHEST PAIN, UNSPECIFIED TYPE: ICD-10-CM

## 2020-07-30 DIAGNOSIS — K21.9 GASTROESOPHAGEAL REFLUX DISEASE, ESOPHAGITIS PRESENCE NOT SPECIFIED: ICD-10-CM

## 2020-07-30 LAB
ALBUMIN SERPL BCP-MCNC: 4.6 G/DL (ref 3.2–4.9)
ALBUMIN/GLOB SERPL: 1.8 G/DL
ALP SERPL-CCNC: 67 U/L (ref 30–99)
ALT SERPL-CCNC: 12 U/L (ref 2–50)
ANION GAP SERPL CALC-SCNC: 11 MMOL/L (ref 7–16)
AST SERPL-CCNC: 20 U/L (ref 12–45)
BASOPHILS # BLD AUTO: 0.4 % (ref 0–1.8)
BASOPHILS # BLD: 0.02 K/UL (ref 0–0.12)
BILIRUB SERPL-MCNC: 0.4 MG/DL (ref 0.1–1.5)
BUN SERPL-MCNC: 6 MG/DL (ref 8–22)
CALCIUM SERPL-MCNC: 9.2 MG/DL (ref 8.5–10.5)
CHLORIDE SERPL-SCNC: 89 MMOL/L (ref 96–112)
CO2 SERPL-SCNC: 21 MMOL/L (ref 20–33)
CREAT SERPL-MCNC: 0.81 MG/DL (ref 0.5–1.4)
EKG IMPRESSION: NORMAL
EOSINOPHIL # BLD AUTO: 0.05 K/UL (ref 0–0.51)
EOSINOPHIL NFR BLD: 1.1 % (ref 0–6.9)
ERYTHROCYTE [DISTWIDTH] IN BLOOD BY AUTOMATED COUNT: 40.4 FL (ref 35.9–50)
GLOBULIN SER CALC-MCNC: 2.5 G/DL (ref 1.9–3.5)
GLUCOSE SERPL-MCNC: 94 MG/DL (ref 65–99)
HCT VFR BLD AUTO: 39.2 % (ref 42–52)
HGB BLD-MCNC: 13.8 G/DL (ref 14–18)
IMM GRANULOCYTES # BLD AUTO: 0.02 K/UL (ref 0–0.11)
IMM GRANULOCYTES NFR BLD AUTO: 0.4 % (ref 0–0.9)
LIPASE SERPL-CCNC: 25 U/L (ref 11–82)
LYMPHOCYTES # BLD AUTO: 1.96 K/UL (ref 1–4.8)
LYMPHOCYTES NFR BLD: 42.9 % (ref 22–41)
MCH RBC QN AUTO: 31 PG (ref 27–33)
MCHC RBC AUTO-ENTMCNC: 35.2 G/DL (ref 33.7–35.3)
MCV RBC AUTO: 88.1 FL (ref 81.4–97.8)
MONOCYTES # BLD AUTO: 0.57 K/UL (ref 0–0.85)
MONOCYTES NFR BLD AUTO: 12.5 % (ref 0–13.4)
NEUTROPHILS # BLD AUTO: 1.95 K/UL (ref 1.82–7.42)
NEUTROPHILS NFR BLD: 42.7 % (ref 44–72)
NRBC # BLD AUTO: 0 K/UL
NRBC BLD-RTO: 0 /100 WBC
PLATELET # BLD AUTO: 196 K/UL (ref 164–446)
PMV BLD AUTO: 9.9 FL (ref 9–12.9)
POTASSIUM SERPL-SCNC: 4.3 MMOL/L (ref 3.6–5.5)
PROT SERPL-MCNC: 7.1 G/DL (ref 6–8.2)
RBC # BLD AUTO: 4.45 M/UL (ref 4.7–6.1)
SARS-COV-2 RNA RESP QL NAA+PROBE: NOTDETECTED
SODIUM SERPL-SCNC: 121 MMOL/L (ref 135–145)
SPECIMEN SOURCE: NORMAL
TROPONIN T SERPL-MCNC: 9 NG/L (ref 6–19)
WBC # BLD AUTO: 4.6 K/UL (ref 4.8–10.8)

## 2020-07-30 PROCEDURE — 84484 ASSAY OF TROPONIN QUANT: CPT

## 2020-07-30 PROCEDURE — 700102 HCHG RX REV CODE 250 W/ 637 OVERRIDE(OP): Performed by: EMERGENCY MEDICINE

## 2020-07-30 PROCEDURE — 80053 COMPREHEN METABOLIC PANEL: CPT

## 2020-07-30 PROCEDURE — 93005 ELECTROCARDIOGRAM TRACING: CPT | Performed by: EMERGENCY MEDICINE

## 2020-07-30 PROCEDURE — A9270 NON-COVERED ITEM OR SERVICE: HCPCS | Performed by: EMERGENCY MEDICINE

## 2020-07-30 PROCEDURE — 93005 ELECTROCARDIOGRAM TRACING: CPT

## 2020-07-30 PROCEDURE — 83690 ASSAY OF LIPASE: CPT

## 2020-07-30 PROCEDURE — 99284 EMERGENCY DEPT VISIT MOD MDM: CPT

## 2020-07-30 PROCEDURE — 85025 COMPLETE CBC W/AUTO DIFF WBC: CPT

## 2020-07-30 PROCEDURE — 71045 X-RAY EXAM CHEST 1 VIEW: CPT

## 2020-07-30 RX ORDER — BENZTROPINE MESYLATE 1 MG/1
1 TABLET ORAL 2 TIMES DAILY
Status: SHIPPED | COMMUNITY
End: 2023-07-16

## 2020-07-30 RX ORDER — BENZTROPINE MESYLATE 2 MG/1
2 TABLET ORAL 2 TIMES DAILY
Status: SHIPPED | COMMUNITY
End: 2023-07-16

## 2020-07-30 RX ADMIN — LIDOCAINE HYDROCHLORIDE 30 ML: 20 SOLUTION OROPHARYNGEAL at 18:38

## 2020-07-30 ASSESSMENT — ENCOUNTER SYMPTOMS
COUGH: 1
CHILLS: 0
SHORTNESS OF BREATH: 1
FEVER: 0

## 2020-07-30 ASSESSMENT — FIBROSIS 4 INDEX: FIB4 SCORE: 2.38

## 2020-07-30 NOTE — ED NOTES
COVID sent, ERP evaluation revealed the patient no longer has chest pain and the patient stated it went away an hour ago, which was not consistent with this RN's previous assessment. During Nasal swab, the patient denied CP.

## 2020-07-30 NOTE — ED NOTES
Assumed care of patient. Pt assessed, AAO x 4 . Patient's concerns addressed.  Fall precautions in place.  Pt repositioned and comfortable.  Call light within reach. Will continue to monitor.  Patient is currently reporting chest pain, 9/10 in the upper epigastric/lower chest that feels like a pressure. The patient reports a history of chest pain before with no stated prior diagnosis.  ERP at the bedside.

## 2020-07-30 NOTE — ED NOTES
Patient has mild dysarthria with a slight garbled word pronouncement. Patient states this is a chronic problem after being treated with past psychiatric medications.

## 2020-07-30 NOTE — ED TRIAGE NOTES
Patient reports gradual onset CP since noon today.  Patient resting on gurney, respirations even and unlabored.

## 2020-07-30 NOTE — ED TRIAGE NOTES
Pt BIB EMS from group home  Chief Complaint   Patient presents with   • Chest Pain     intermittent x 1 week   Substernal.  deneis SOB, N/V.  FSBS 114.  Poor historian due to psych hx.    Allergy to ASA so did not receive PTA.

## 2020-07-30 NOTE — ED PROVIDER NOTES
ED Provider Note    ED Provider Note    Primary care provider: Librado Colin M.D.  Means of arrival: EMS  History obtained from: Patient    CHIEF COMPLAINT  Chief Complaint   Patient presents with   • Chest Pain     intermittent x 1 week     Seen at 7:10 PM.   HPI  Haile Chavez is a 55 y.o. male who presents to the Emergency Department with chest pain.  He states the chest pain began at around noon today when he was at rest.  He describes it as substernal, nonradiating and squeezing.  The pain sometimes is mild, sometimes severe without any modifying factors.  The patient was seen for this in the past and he was told if he ever had any more pain he should return to the emergency department.  He has some associated shortness of breath and some nausea at times.  The last time he experienced the pain was prior to arrival in the emergency department, approximately 1 hour prior to my evaluation of the patient.    He does live in a group home, he is concerned as his roommate has a deep cough and does not wear a mask, therefore he has some concerns about Covid-19.    He denies any numbness, weakness, bleeding diathesis or impaired immunity.  He does not smoke.    REVIEW OF SYSTEMS  See HPI,   Remainder of ROS negative.     PAST MEDICAL HISTORY   has a past medical history of Angina, Diabetes (HCC), Hepatitis C (5/24/1996), HTN (hypertension), benign (4/22/2008), Psychiatric disorder, PTSD (post-traumatic stress disorder), Schizo affective schizophrenia (HCC) (4/22/2010), Schizophrenia, schizo-affective type (HCC), and Tuberculosis (4/22/1992).    SURGICAL HISTORY   has a past surgical history that includes gastroscopy with biopsy (9/3/2010); other abdominal surgery (1982); gastroscopy with biopsy (11/7/2010); and other orthopedic surgery (2000).    SOCIAL HISTORY  Social History     Tobacco Use   • Smoking status: Current Every Day Smoker     Packs/day: 0.50     Years: 30.00     Pack years: 15.00     Types: Cigars    • Smokeless tobacco: Never Used   • Tobacco comment: 3/4 ppd x 30 years   Substance Use Topics   • Alcohol use: Not Currently   • Drug use: No      Social History     Substance and Sexual Activity   Drug Use No       FAMILY HISTORY  Family History   Problem Relation Age of Onset   • Genetic Disorder Neg Hx        CURRENT MEDICATIONS  Reviewed.  See Encounter Summary.     ALLERGIES  Allergies   Allergen Reactions   • Hctz      Hyponatremia     • Nsaids    • Other Drug Nausea     All anti-inflammitories,  Headache and body aches.   • Vicodin [Hydrocodone-Acetaminophen] Vomiting     Headaches   • Vicodin [Hydrocodone-Acetaminophen]    • Amoxicillin Nausea     Headache and body aches       PHYSICAL EXAM  VITAL SIGNS: /104   Pulse 74   Temp 36.8 °C (98.2 °F) (Temporal)   Resp 20   Ht 1.829 m (6')   Wt 95.3 kg (210 lb)   SpO2 98%   BMI 28.48 kg/m²   Constitutional: Awake, alert in no apparent distress.  HENT: Normocephalic, Bilateral external ears normal. Nose normal.   Eyes: Conjunctiva normal, non-icteric, EOMI.    Thorax & Lungs: Easy unlabored respirations, Clear to ascultation bilaterally.  Cardiovascular: Regular rate, Regular rhythm, No murmurs, rubs or gallops. Bilateral pulses symmetrical.   Abdomen:  Soft, nontender, nondistended, normal active bowel sounds.   :    Skin: Visualized skin is  Dry, No erythema, No rash.   Musculoskeletal:   No cyanosis, clubbing or edema. No leg asymmetry.   Neurologic: Alert, Grossly non-focal.   Psychiatric: Normal affect, Normal mood  Lymphatic:  No cervical LAD    EKG   12 lead Interpreted by me  Rhythm:  Normal sinus rhythm   Rate: 74  Axis: normal  Ectopy: none  Conduction: normal  ST Segments: no acute change  T Waves: no acute change  Clinical Impression: Normal EKG without acute changes     RADIOLOGY  DX-CHEST-PORTABLE (1 VIEW)   Final Result      Ill-defined airspace opacity at the right lung base is unchanged and is most likely related to calcified  pleural plaque seen on prior CT.               COURSE & MEDICAL DECISION MAKING  Pertinent Labs & Imaging studies reviewed. (See chart for details)    Differential diagnoses include but are not limited to: Atypical chest pain    7:10 PM - Medical record reviewed, patient with extensive psychiatric history, he was last in the emergency department 7/7 for chest pain.  Cardiac work-up was unremarkable and the patient was subsequently discharged.      Decision Making:  This is a 55 y.o. year old male who presents with atypical chest pain.  The pain occurs at rest, last anywhere from a few minutes to a few hours without any significant modifying factors.  The patient has been seen for this a few weeks ago as well.  EKG today she does not show any acute ischemic changes, high since her troponin is negative.  The patient's heart score is low to moderate, he does have some significant cardiac risks.  He appeared mostly concerned about Covid-19 as his roommate does cough quite a bit.  I did send a swab for this, the patient will be contacted with any positive results.  I also left a message the cardiac  to arrange outpatient follow-up for chest pain.    Discharge Medications:  Discharge Medication List as of 7/29/2020  8:22 PM          The patient was discharged home (see d/c instructions) was told to return immediately for any signs or symptoms listed, or any worsening at all.  The patient verbally agreed to the discharge precautions and follow-up plan which is documented in EPIC.    The patient's blood pressure is elevated today. >120/80. I have referred them to primary care for follow up.       FINAL IMPRESSION  1. Chest pain, unspecified type

## 2020-07-30 NOTE — ED NOTES
Discharge teaching and paperwork provided regarding chest pain and all questions/concerns answered. VSS, chest assessment stable and PIV removed. Given information regarding home care and follow up. Patient discharged to the care of himself and ambulated out of the ED.

## 2020-07-31 ENCOUNTER — OFFICE VISIT (OUTPATIENT)
Dept: MEDICAL GROUP | Facility: MEDICAL CENTER | Age: 55
End: 2020-07-31
Payer: MEDICARE

## 2020-07-31 ENCOUNTER — OFFICE VISIT (OUTPATIENT)
Dept: CARDIOLOGY | Facility: MEDICAL CENTER | Age: 55
End: 2020-07-31
Payer: MEDICARE

## 2020-07-31 VITALS
WEIGHT: 207 LBS | HEART RATE: 78 BPM | DIASTOLIC BLOOD PRESSURE: 72 MMHG | OXYGEN SATURATION: 97 % | HEIGHT: 72 IN | BODY MASS INDEX: 28.04 KG/M2 | SYSTOLIC BLOOD PRESSURE: 106 MMHG

## 2020-07-31 VITALS
OXYGEN SATURATION: 97 % | BODY MASS INDEX: 26.55 KG/M2 | HEART RATE: 78 BPM | HEIGHT: 72 IN | SYSTOLIC BLOOD PRESSURE: 112 MMHG | DIASTOLIC BLOOD PRESSURE: 68 MMHG | WEIGHT: 196 LBS | TEMPERATURE: 97 F

## 2020-07-31 DIAGNOSIS — F99 PSYCHIATRIC DISORDER: ICD-10-CM

## 2020-07-31 DIAGNOSIS — R07.89 OTHER CHEST PAIN: ICD-10-CM

## 2020-07-31 DIAGNOSIS — R07.9 CHEST PAIN, UNSPECIFIED TYPE: ICD-10-CM

## 2020-07-31 PROCEDURE — 99204 OFFICE O/P NEW MOD 45 MIN: CPT | Performed by: INTERNAL MEDICINE

## 2020-07-31 PROCEDURE — 99213 OFFICE O/P EST LOW 20 MIN: CPT | Performed by: INTERNAL MEDICINE

## 2020-07-31 RX ORDER — ACETAMINOPHEN 500 MG
1000 TABLET ORAL EVERY 8 HOURS PRN
Qty: 90 TAB | Refills: 3 | Status: SHIPPED | OUTPATIENT
Start: 2020-07-31 | End: 2022-05-11

## 2020-07-31 RX ORDER — DOCUSATE SODIUM 100 MG/1
100 CAPSULE, LIQUID FILLED ORAL 2 TIMES DAILY
COMMUNITY
End: 2020-08-18

## 2020-07-31 RX ORDER — PANTOPRAZOLE SODIUM 40 MG/1
40 TABLET, DELAYED RELEASE ORAL 2 TIMES DAILY
Qty: 180 TAB | Refills: 3 | Status: SHIPPED | OUTPATIENT
Start: 2020-07-31 | End: 2023-07-16

## 2020-07-31 ASSESSMENT — ENCOUNTER SYMPTOMS
ORTHOPNEA: 0
SPEECH CHANGE: 0
CLAUDICATION: 0
SHORTNESS OF BREATH: 0
MYALGIAS: 0
FALLS: 0
PND: 0
SENSORY CHANGE: 0
BRUISES/BLEEDS EASILY: 0
DEPRESSION: 0
WEIGHT LOSS: 0
HALLUCINATIONS: 0
LOSS OF CONSCIOUSNESS: 0
VOMITING: 0
EYE PAIN: 0
CHILLS: 0
ABDOMINAL PAIN: 0
FEVER: 0
HEADACHES: 0
DOUBLE VISION: 0
EYE DISCHARGE: 0
BLOOD IN STOOL: 0
DIZZINESS: 0
COUGH: 0
NAUSEA: 0
BLURRED VISION: 0
PALPITATIONS: 0

## 2020-07-31 ASSESSMENT — FIBROSIS 4 INDEX
FIB4 SCORE: 1.68
FIB4 SCORE: 1.68

## 2020-07-31 NOTE — ED PROVIDER NOTES
"ED Provider Note    Scribed for Edmond Carranza M.D. by Mary Teixeira. 7/30/2020, 6:35 PM.    Primary care provider: Librado Colin M.D.  Means of arrival: Ambulance  History obtained from: patient  History limited by: none    CHIEF COMPLAINT  Chief Complaint   Patient presents with   • Chest Pain     BIBA from home for Chest pain 9/10. Feels like \"a boulcer in my chest.\" Seen at this facility yesterday for same       HPI  Haile Chavez is a 55 y.o. male who presents to the Emergency Department for chest pain onset around lunch time. He was seen for the same symptoms yesterday and was discharged home after an unremarkable cardiac workup. Patient states he has been unable to eat the past 2 1/2 days secondary to pain. He has associated shortness of breath, mild cough, and has been clammy, but denies chills or fever.     REVIEW OF SYSTEMS  Review of Systems   Constitutional: Negative for chills and fever.   Respiratory: Positive for cough and shortness of breath.    Cardiovascular: Positive for chest pain.   All other systems reviewed and are negative.      PAST MEDICAL HISTORY   has a past medical history of Angina, Diabetes (HCC), Hepatitis C (5/24/1996), HTN (hypertension), benign (4/22/2008), Psychiatric disorder, PTSD (post-traumatic stress disorder), Schizo affective schizophrenia (HCC) (4/22/2010), Schizophrenia, schizo-affective type (HCC), and Tuberculosis (4/22/1992).    SURGICAL HISTORY   has a past surgical history that includes gastroscopy with biopsy (9/3/2010); other abdominal surgery (1982); gastroscopy with biopsy (11/7/2010); and other orthopedic surgery (2000).    SOCIAL HISTORY  Social History     Tobacco Use   • Smoking status: Current Every Day Smoker     Packs/day: 0.50     Years: 30.00     Pack years: 15.00     Types: Cigars   • Smokeless tobacco: Never Used   • Tobacco comment: 3/4 ppd x 30 years   Substance Use Topics   • Alcohol use: Not Currently   • Drug use: No      Social History "     Substance and Sexual Activity   Drug Use No       FAMILY HISTORY  Family History   Problem Relation Age of Onset   • Genetic Disorder Neg Hx        CURRENT MEDICATIONS  Home Medications     Reviewed by Carly Brooks (Pharmacy Tech) on 07/30/20 at 1932  Med List Status: Complete   Medication Last Dose Status   Baclofen 5 MG Tab 7/30/2020 Active   benztropine (COGENTIN) 1 MG Tab 7/30/2020 Active   benztropine (COGENTIN) 2 MG Tab 7/30/2020 Active   celecoxib (CELEBREX) 200 MG Cap 7/30/2020 Active   divalproex ER (DEPAKOTE ER) 500 MG TABLET SR 24 HR 7/30/2020 Active   famotidine (PEPCID) 20 MG Tab 7/30/2020 Active   ferrous sulfate 325 (65 Fe) MG tablet 7/30/2020 Active   INVEGA SUSTENNA 234 MG/1.5ML Suspension Prefilled Syringe 7/13/2020 Active   levothyroxine (SYNTHROID) 50 MCG Tab 7/30/2020 Active   metFORMIN (GLUCOPHAGE) 500 MG Tab 7/30/2020 Active   naproxen (NAPROSYN) 375 MG Tab 7/30/2020 Active   olanzapine (ZYPREXA) 20 MG tablet 7/29/2020 Active   trihexyphenidyl (ARTANE) 2 MG Tab 7/30/2020 Active                ALLERGIES  Allergies   Allergen Reactions   • Hctz      Hyponatremia     • Nsaids    • Other Drug Nausea     All anti-inflammitories,  Headache and body aches.   • Vicodin [Hydrocodone-Acetaminophen] Vomiting     Headaches   • Vicodin [Hydrocodone-Acetaminophen]    • Amoxicillin Nausea     Headache and body aches       PHYSICAL EXAM  VITAL SIGNS: /89   Pulse 76   Temp 36.8 °C (98.2 °F) (Temporal)   Resp 19   Ht 1.829 m (6')   Wt 95.3 kg (210 lb)   SpO2 97%   BMI 28.48 kg/m²     Constitutional:  No acute distress  HENT: Moist mucous membranes, poor dentition.  Eyes: No conjunctivitis or icterus  Neck: trachea is midline, no palpable thyroid  Lymphatic: No cervical lymphadenopathy  Cardiovascular: Regular rate and rhythm, no murmurs  Thorax & Lungs: Normal breath sounds, no rhonchi  Abdomen: Soft, Non-tender  Skin:. no rash  Back: Non-tender, no CVA tenderness  Extremities:  no  "edema  Vascular: symmetric radial pulse  Neurologic: Normal gross motor    LABS  Labs Reviewed   CBC WITH DIFFERENTIAL - Abnormal; Notable for the following components:       Result Value    WBC 4.6 (*)     RBC 4.45 (*)     Hemoglobin 13.8 (*)     Hematocrit 39.2 (*)     Neutrophils-Polys 42.70 (*)     Lymphocytes 42.90 (*)     All other components within normal limits   COMP METABOLIC PANEL - Abnormal; Notable for the following components:    Sodium 121 (*)     Chloride 89 (*)     Bun 6 (*)     All other components within normal limits   TROPONIN   LIPASE   ESTIMATED GFR     All labs reviewed by me.    EKG Interpretation  Interpreted by me    Rhythm: normal sinus   Rate: normal  Axis: normal  Ectopy: none  Conduction: normal  ST Segments: no acute change  T Waves: no acute change  Q Waves: none    Clinical Impression: no acute changes and normal EKG    RADIOLOGY  DX-CHEST-PORTABLE (1 VIEW)   Final Result      Patchy opacity at the right lung base likely corresponds to the calcified pleural plaque seen on prior CT.      No acute interval change is identified.           The radiologist's interpretation of all radiological studies have been reviewed by me.    COURSE & MEDICAL DECISION MAKING  Pertinent Labs & Imaging studies reviewed. (See chart for details)    6:35 PM - Patient seen and examined at bedside. Patient was given a Gi cocktail and states it helped take away the \"intense pain\" however he still has pain. We will review his medications and determine if any of these could be causing his symptoms. Additionally we will obtain labs and imaging. Patient will be treated with Gi cocktail. Ordered chest x-ray, CBC w/ diff, CMP, troponin, lipase, and EKG to evaluate his symptoms.     7:33 PM - Patient was reevaluated at bedside. Discussed lab and radiology results with the patient and informed them that his sodium is low. Patient states he has a history of low sodium and takes sodium tablets. I informed him the " naprosyn and Celebrex are causing his chest pain and he should stop taking this. Additionally I advised him to not drink as much water and increase his intake of Gatorade or other electrolyte fluids as this will help improve his sodium levels. I will do further research however I do not think he will need to be admitted.    7:41 PM - Patient was reevaluated. I discussed that his symptoms are related to gastritis. I will start him on a acid reducer. I re discussed the importance of increasing his intake of electrolyte fluids. Recommended he do follow up labs in one week. Patient will be discharged at this time. He verbalizes agreement with discharge and plan of care.          The patient will return for new or worsening symptoms and is stable at the time of discharge.    DISPOSITION:  Patient will be discharged home in stable condition.    FOLLOW UP:  Librado Colin M.D.  [address] [place] [id number]  [phone]            OUTPATIENT MEDICATIONS:  Discharge Medication List as of 7/30/2020  7:58 PM          FINAL IMPRESSION  1. Chest pain, unspecified type    2. Gastroesophageal reflux disease, esophagitis presence not specified    3. Hyponatremia          Mary PAGE (Maciejibkandace), am scribing for, and in the presence of, Edmond Carranza M.D..    Electronically signed by: Mary Teixeira (Ngoc), 7/30/2020    Edmond PAGE M.D. personally performed the services described in this documentation, as scribed by Mary Teixeira in my presence, and it is both accurate and complete. C    The note accurately reflects work and decisions made by me.  Edmond Carranza M.D.  7/30/2020  11:22 PM

## 2020-07-31 NOTE — ED NOTES
COVID-19 Test Follow Up  07/31/20      Results for JAYMIE SINGH (MRN [id number]) as of 7/31/2020 10:57     7/29/2020 19:45   COVID Order Status Discharge-Recvd   SARS-CoV-2 by PCR NotDetected   SARS-CoV-2 Source NP Swab     Patient tested negative for COVID-19. I have informed the patient of the result by My Chart message. Encouraged to stay at home until no fever for 72 hours without the use of fever reducing medications and symptoms improving. Informed there is no need to further self-isolate for 14 days for COVID-19 unless otherwise directed by the Health Dept.     They are advised to return to the ER for worsening symptoms including difficulty breathing, ongoing fever, weakness or chest pain.    Jane Rush, PharmD

## 2020-07-31 NOTE — ED NOTES
Pt A&Ox4, given discharge instructions. Discussed follow-up, diet, activity, symptoms and management. Pt educated on how to take his prescribed medications. Pt verbalized understanding of all discharge instructions. All questions answered. IV d/c'd. Pt ambulated off unit, all belongings accounted for.

## 2020-07-31 NOTE — PROGRESS NOTES
Chief Complaint   Patient presents with   • Hypertension     New patient        Subjective:   Haile Chavez is a 55 y.o. male who presents today for cardiac care and evaluation for chest pain. Patient has chest pain at sporadic times. No specific precipitating factors or worsening factors. Chest pain is described to be burning sharp-like sensation when he eats. Lasting for seconds to minutes. No prior cardiac problems. No prior cardiac workup.    I have personally interpreted her EKG today with patient, there is no evidence of acute coronary syndrome, no evidence of prior infarct, normal MN and QT interval, no significant conduction disease.      Past Medical History:   Diagnosis Date   • Angina    • Diabetes (HCC)    • Hepatitis C 5/24/1996   • HTN (hypertension), benign 4/22/2008   • Psychiatric disorder     Schizoaffective   • PTSD (post-traumatic stress disorder)    • Schizo affective schizophrenia (HCC) 4/22/2010   • Schizophrenia, schizo-affective type (HCC)    • Tuberculosis 4/22/1992     Past Surgical History:   Procedure Laterality Date   • GASTROSCOPY WITH BIOPSY  11/7/2010    Performed by MARY SAENZ at ENDOSCOPY [facility]   • GASTROSCOPY WITH BIOPSY  9/3/2010    Performed by DIONNE KELLEY at ENDOSCOPY [facility]   • OTHER ORTHOPEDIC SURGERY  2000    left ankle repair, total fusion.   • OTHER ABDOMINAL SURGERY  1982    hernia repair     Family History   Problem Relation Age of Onset   • Genetic Disorder Neg Hx      Social History     Socioeconomic History   • Marital status: Single     Spouse name: Not on file   • Number of children: Not on file   • Years of education: Not on file   • Highest education level: Not on file   Occupational History   • Not on file   Social Needs   • Financial resource strain: Not on file   • Food insecurity     Worry: Not on file     Inability: Not on file   • Transportation needs     Medical: Not on file     Non-medical: Not on file   Tobacco Use   •  Smoking status: Current Every Day Smoker     Packs/day: 0.50     Years: 30.00     Pack years: 15.00     Types: Cigars   • Smokeless tobacco: Never Used   • Tobacco comment: 3/4 ppd x 30 years   Substance and Sexual Activity   • Alcohol use: Not Currently   • Drug use: No   • Sexual activity: Yes     Partners: Female   Lifestyle   • Physical activity     Days per week: Not on file     Minutes per session: Not on file   • Stress: Not on file   Relationships   • Social connections     Talks on phone: Not on file     Gets together: Not on file     Attends Sabianism service: Not on file     Active member of club or organization: Not on file     Attends meetings of clubs or organizations: Not on file     Relationship status: Not on file   • Intimate partner violence     Fear of current or ex partner: Not on file     Emotionally abused: Not on file     Physically abused: Not on file     Forced sexual activity: Not on file   Other Topics Concern   • Not on file   Social History Narrative    ** Merged History Encounter **          Allergies   Allergen Reactions   • Hctz      Hyponatremia     • Nsaids    • Other Drug Nausea     All anti-inflammitories,  Headache and body aches.   • Vicodin [Hydrocodone-Acetaminophen] Vomiting     Headaches   • Vicodin [Hydrocodone-Acetaminophen]    • Amoxicillin Nausea     Headache and body aches     Outpatient Encounter Medications as of 7/31/2020   Medication Sig Dispense Refill   • docusate sodium (STOOL SOFTENER) 100 MG Cap Take 100 mg by mouth 2 times a day.     • benztropine (COGENTIN) 2 MG Tab Take 2 mg by mouth 2 times a day. Take one tablet twice a day along with a 1 mg tablet for a total of 3 mg.     • metFORMIN (GLUCOPHAGE) 500 MG Tab Take 1 Tab by mouth every day. 90 Tab 3   • INVEGA SUSTENNA 234 MG/1.5ML Suspension Prefilled Syringe 234 mg by Intramuscular route Once. Once every 21 days.     • Baclofen 5 MG Tab TAKE 1 TABLET BY MOUTH TWICE A DAY 56 Tab 10   • famotidine (PEPCID)  20 MG Tab TAKE 1 TABLET BY MOUTH TWICE A  Tab 10   • trihexyphenidyl (ARTANE) 2 MG Tab Take 2 mg by mouth every day. Take 1 tablet once daily  1   • divalproex ER (DEPAKOTE ER) 500 MG TABLET SR 24 HR Take 2 Tabs by mouth 2 Times a Day. 30 Tab 5   • olanzapine (ZYPREXA) 20 MG tablet Take 20 mg by mouth every bedtime.  5   • benztropine (COGENTIN) 1 MG Tab Take 1 mg by mouth 2 times a day. Take one tablet twice a day along with a 2 mg tablet for a total of 3 mg     • levothyroxine (SYNTHROID) 50 MCG Tab TAKE 1 TABLET BY MOUTH EVERY MORNING ON AN EMPTY STOMACH (Patient not taking: Reported on 7/31/2020) 28 Tab 10   • naproxen (NAPROSYN) 375 MG Tab Take 1 Tab by mouth 2 times a day, with meals. (Patient not taking: Reported on 7/31/2020) 14 Tab 6   • ferrous sulfate 325 (65 Fe) MG tablet TAKE 1 TABLET BY MOUTH EVERY DAY (Patient not taking: Reported on 7/31/2020) 28 Tab 10   • celecoxib (CELEBREX) 200 MG Cap Take 1 Cap by mouth 2 times a day. (Patient not taking: Reported on 7/31/2020) 60 Cap 11     No facility-administered encounter medications on file as of 7/31/2020.      Review of Systems   Constitutional: Negative for chills, fever, malaise/fatigue and weight loss.   HENT: Negative for ear discharge, ear pain, hearing loss and nosebleeds.    Eyes: Negative for blurred vision, double vision, pain and discharge.   Respiratory: Negative for cough and shortness of breath.    Cardiovascular: Positive for chest pain. Negative for palpitations, orthopnea, claudication, leg swelling and PND.   Gastrointestinal: Negative for abdominal pain, blood in stool, melena, nausea and vomiting.   Genitourinary: Negative for dysuria and hematuria.   Musculoskeletal: Negative for falls, joint pain and myalgias.   Skin: Negative for itching and rash.   Neurological: Negative for dizziness, sensory change, speech change, loss of consciousness and headaches.   Endo/Heme/Allergies: Negative for environmental allergies. Does not  bruise/bleed easily.   Psychiatric/Behavioral: Negative for depression, hallucinations and suicidal ideas.        Objective:   /72 (BP Location: Right arm, Patient Position: Sitting, BP Cuff Size: Adult)   Pulse 78   Ht 1.829 m (6')   Wt 93.9 kg (207 lb)   SpO2 97%   BMI 28.07 kg/m²     Physical Exam   Constitutional: He is oriented to person, place, and time. No distress.   HENT:   Head: Normocephalic and atraumatic.   Right Ear: External ear normal.   Left Ear: External ear normal.   Eyes: Right eye exhibits no discharge. Left eye exhibits no discharge.   Neck: No JVD present. No thyromegaly present.   Cardiovascular: Normal rate, regular rhythm, normal heart sounds and intact distal pulses. Exam reveals no gallop and no friction rub.   No murmur heard.  Pulmonary/Chest: Breath sounds normal. No respiratory distress.   Abdominal: Bowel sounds are normal. He exhibits no distension. There is no abdominal tenderness.   Musculoskeletal:         General: No tenderness or edema.   Neurological: He is alert and oriented to person, place, and time. No cranial nerve deficit.   Skin: Skin is warm and dry. He is not diaphoretic.   Psychiatric: He has a normal mood and affect. His behavior is normal.   Nursing note and vitals reviewed.      Assessment:     1. Other chest pain     2. Psychiatric disorder         Medical Decision Making:  Today's Assessment / Status / Plan:   At this time, to further risk stratify, I will order a transthoracic echocardiogram to assess cardiac and valvular functions. I will also order a myocardial nuclear scan stress test to assess for coronary ischemia.

## 2020-07-31 NOTE — PROGRESS NOTES
CC: Follow-up ER chest pain.                                                                                                                                      HPI:   Haile presents today with the following.    1. Chest pain, unspecified type  Presents complaining of persistent chest pain he was seen by cardiology and written for stress test and echocardiogram.  Troponins were negative.  Thought to be likely related to his use of naproxen and his esophagus.  Denies any blood in stool or black stools no abdominal pains.      Patient Active Problem List    Diagnosis Date Noted   • Schizo affective schizophrenia (HCC) 04/22/2010     Priority: High   • Hepatitis C 05/24/2010     Priority: Medium   • HTN (hypertension), benign 04/22/2010     Priority: Medium   • Lung calcification 05/11/2018     Priority: Low   • GERD (gastroesophageal reflux disease) 07/14/2013     Priority: Low   • Acquired hypothyroidism 05/28/2019   • IGT (impaired glucose tolerance) 03/21/2017   • Encounter for long-term (current) use of other medications 01/29/2015       Current Outpatient Medications   Medication Sig Dispense Refill   • docusate sodium (STOOL SOFTENER) 100 MG Cap Take 100 mg by mouth 2 times a day.     • pantoprazole (PROTONIX) 40 MG Tablet Delayed Response Take 1 Tab by mouth 2 times a day. 180 Tab 3   • acetaminophen (TYLENOL) 500 MG Tab Take 2 Tabs by mouth every 8 hours as needed. 90 Tab 3   • benztropine (COGENTIN) 1 MG Tab Take 1 mg by mouth 2 times a day. Take one tablet twice a day along with a 2 mg tablet for a total of 3 mg     • benztropine (COGENTIN) 2 MG Tab Take 2 mg by mouth 2 times a day. Take one tablet twice a day along with a 1 mg tablet for a total of 3 mg.     • metFORMIN (GLUCOPHAGE) 500 MG Tab Take 1 Tab by mouth every day. 90 Tab 3   • INVEGA SUSTENNA 234 MG/1.5ML Suspension Prefilled Syringe 234 mg by Intramuscular route Once. Once every 21 days.     • Baclofen 5 MG Tab TAKE 1 TABLET BY MOUTH  TWICE A DAY 56 Tab 10   • trihexyphenidyl (ARTANE) 2 MG Tab Take 2 mg by mouth every day. Take 1 tablet once daily  1   • divalproex ER (DEPAKOTE ER) 500 MG TABLET SR 24 HR Take 2 Tabs by mouth 2 Times a Day. 30 Tab 5   • olanzapine (ZYPREXA) 20 MG tablet Take 20 mg by mouth every bedtime.  5     No current facility-administered medications for this visit.          Allergies as of 07/31/2020 - Reviewed 07/31/2020   Allergen Reaction Noted   • Hctz  05/24/2012   • Nsaids  05/11/2018   • Other drug Nausea 04/22/2010   • Vicodin [hydrocodone-acetaminophen] Vomiting 11/12/2007   • Vicodin [hydrocodone-acetaminophen]  05/11/2018   • Amoxicillin Nausea 04/22/2010        ROS: Denies Chest pain, SOB, LE edema.    /68 (BP Location: Right arm, Patient Position: Sitting)   Pulse 78   Temp 36.1 °C (97 °F)   Ht 1.829 m (6')   Wt 88.9 kg (196 lb)   SpO2 97%   BMI 26.58 kg/m²     Physical Exam:  Gen:         Alert and oriented, No apparent distress.  Neck:        No Lymphadenopathy or Bruits.  Lungs:     Clear to auscultation bilaterally  CV:          Regular rate and rhythm. No murmurs, rubs or gallops.               Ext:          No clubbing, cyanosis, edema.      Assessment and Plan.   55 y.o. male with the following issues.    1. Chest pain, unspecified type  Cardiac work-up per cardiology starting on pantoprazole 40 mg twice daily caution about side effects.  Discontinue all NSAIDs switching to Tylenol.

## 2020-07-31 NOTE — ED TRIAGE NOTES
"Haile Chavez presents via [facility], wearing a mask, reporting:  Chief Complaint   Patient presents with   • Chest Pain     BIBA from home for Chest pain 9/10. Feels like \"a boulcer in my chest.\" Seen at this facility yesterday for same       Pt assessed, educated on plan for care, placed in [facility], on monitor.       /89   Pulse 76   Temp 36.8 °C (98.2 °F) (Temporal)   Resp 19   Ht 1.829 m (6')   Wt 95.3 kg (210 lb)   SpO2 97%   BMI 28.48 kg/m²     "

## 2020-08-01 ENCOUNTER — HOSPITAL ENCOUNTER (EMERGENCY)
Facility: MEDICAL CENTER | Age: 55
End: 2020-08-01
Attending: EMERGENCY MEDICINE | Admitting: EMERGENCY MEDICINE
Payer: MEDICARE

## 2020-08-01 ENCOUNTER — APPOINTMENT (OUTPATIENT)
Dept: RADIOLOGY | Facility: MEDICAL CENTER | Age: 55
End: 2020-08-01
Attending: EMERGENCY MEDICINE
Payer: MEDICARE

## 2020-08-01 VITALS
DIASTOLIC BLOOD PRESSURE: 92 MMHG | WEIGHT: 196 LBS | TEMPERATURE: 98.7 F | OXYGEN SATURATION: 95 % | HEIGHT: 72 IN | SYSTOLIC BLOOD PRESSURE: 140 MMHG | RESPIRATION RATE: 20 BRPM | BODY MASS INDEX: 26.55 KG/M2 | HEART RATE: 78 BPM

## 2020-08-01 DIAGNOSIS — R10.13 EPIGASTRIC ABDOMINAL PAIN: ICD-10-CM

## 2020-08-01 DIAGNOSIS — K21.9 GASTROESOPHAGEAL REFLUX DISEASE, ESOPHAGITIS PRESENCE NOT SPECIFIED: ICD-10-CM

## 2020-08-01 LAB
ALBUMIN SERPL BCP-MCNC: 4.3 G/DL (ref 3.2–4.9)
ALBUMIN/GLOB SERPL: 1.6 G/DL
ALP SERPL-CCNC: 79 U/L (ref 30–99)
ALT SERPL-CCNC: 14 U/L (ref 2–50)
ANION GAP SERPL CALC-SCNC: 12 MMOL/L (ref 7–16)
AST SERPL-CCNC: 21 U/L (ref 12–45)
BASOPHILS # BLD AUTO: 0.5 % (ref 0–1.8)
BASOPHILS # BLD: 0.02 K/UL (ref 0–0.12)
BILIRUB SERPL-MCNC: 0.3 MG/DL (ref 0.1–1.5)
BUN SERPL-MCNC: 7 MG/DL (ref 8–22)
CALCIUM SERPL-MCNC: 8.9 MG/DL (ref 8.5–10.5)
CHLORIDE SERPL-SCNC: 94 MMOL/L (ref 96–112)
CO2 SERPL-SCNC: 20 MMOL/L (ref 20–33)
CREAT SERPL-MCNC: 0.94 MG/DL (ref 0.5–1.4)
EKG IMPRESSION: NORMAL
EOSINOPHIL # BLD AUTO: 0.05 K/UL (ref 0–0.51)
EOSINOPHIL NFR BLD: 1.1 % (ref 0–6.9)
ERYTHROCYTE [DISTWIDTH] IN BLOOD BY AUTOMATED COUNT: 43.3 FL (ref 35.9–50)
GLOBULIN SER CALC-MCNC: 2.7 G/DL (ref 1.9–3.5)
GLUCOSE SERPL-MCNC: 108 MG/DL (ref 65–99)
HCT VFR BLD AUTO: 38.3 % (ref 42–52)
HGB BLD-MCNC: 13.6 G/DL (ref 14–18)
IMM GRANULOCYTES # BLD AUTO: 0.01 K/UL (ref 0–0.11)
IMM GRANULOCYTES NFR BLD AUTO: 0.2 % (ref 0–0.9)
LIPASE SERPL-CCNC: 35 U/L (ref 11–82)
LYMPHOCYTES # BLD AUTO: 1.88 K/UL (ref 1–4.8)
LYMPHOCYTES NFR BLD: 43 % (ref 22–41)
MCH RBC QN AUTO: 31.8 PG (ref 27–33)
MCHC RBC AUTO-ENTMCNC: 35.5 G/DL (ref 33.7–35.3)
MCV RBC AUTO: 89.5 FL (ref 81.4–97.8)
MONOCYTES # BLD AUTO: 0.55 K/UL (ref 0–0.85)
MONOCYTES NFR BLD AUTO: 12.6 % (ref 0–13.4)
NEUTROPHILS # BLD AUTO: 1.86 K/UL (ref 1.82–7.42)
NEUTROPHILS NFR BLD: 42.6 % (ref 44–72)
NRBC # BLD AUTO: 0 K/UL
NRBC BLD-RTO: 0 /100 WBC
PLATELET # BLD AUTO: 167 K/UL (ref 164–446)
PMV BLD AUTO: 9.5 FL (ref 9–12.9)
POTASSIUM SERPL-SCNC: 4.2 MMOL/L (ref 3.6–5.5)
PROT SERPL-MCNC: 7 G/DL (ref 6–8.2)
RBC # BLD AUTO: 4.28 M/UL (ref 4.7–6.1)
SODIUM SERPL-SCNC: 126 MMOL/L (ref 135–145)
TROPONIN T SERPL-MCNC: 7 NG/L (ref 6–19)
WBC # BLD AUTO: 4.4 K/UL (ref 4.8–10.8)

## 2020-08-01 PROCEDURE — 85025 COMPLETE CBC W/AUTO DIFF WBC: CPT

## 2020-08-01 PROCEDURE — 93005 ELECTROCARDIOGRAM TRACING: CPT | Performed by: EMERGENCY MEDICINE

## 2020-08-01 PROCEDURE — 36415 COLL VENOUS BLD VENIPUNCTURE: CPT

## 2020-08-01 PROCEDURE — 83690 ASSAY OF LIPASE: CPT

## 2020-08-01 PROCEDURE — 700102 HCHG RX REV CODE 250 W/ 637 OVERRIDE(OP): Performed by: EMERGENCY MEDICINE

## 2020-08-01 PROCEDURE — 99284 EMERGENCY DEPT VISIT MOD MDM: CPT

## 2020-08-01 PROCEDURE — A9270 NON-COVERED ITEM OR SERVICE: HCPCS | Performed by: EMERGENCY MEDICINE

## 2020-08-01 PROCEDURE — 71045 X-RAY EXAM CHEST 1 VIEW: CPT

## 2020-08-01 PROCEDURE — 84484 ASSAY OF TROPONIN QUANT: CPT

## 2020-08-01 PROCEDURE — 80053 COMPREHEN METABOLIC PANEL: CPT

## 2020-08-01 RX ORDER — OMEPRAZOLE 40 MG/1
40 CAPSULE, DELAYED RELEASE ORAL DAILY
Qty: 30 CAP | Refills: 0 | Status: SHIPPED | OUTPATIENT
Start: 2020-08-01 | End: 2023-04-11

## 2020-08-01 RX ADMIN — LIDOCAINE HYDROCHLORIDE 15 ML: 20 SOLUTION OROPHARYNGEAL at 20:08

## 2020-08-01 ASSESSMENT — FIBROSIS 4 INDEX: FIB4 SCORE: 1.68

## 2020-08-02 NOTE — ED PROVIDER NOTES
ED Provider Note      ER PROVIDER NOTE      CHIEF COMPLAINT  Chief Complaint   Patient presents with   • Chest Pain     Pt reports eating fried chicken about an hour ago and started to experience chest pain.He reports his pain is still there, but has reported that it is more in his abdomen. Pt has hx of GERD and has not gotten his pepcid prescription filled. Pt denies n/v/d, h/a, n/v, blurred vision. aaox4.       HPI  Haile Chavez is a 55 y.o. male who presents to the emergency department complaining of abdominal pain radiating up to his chest.  Patient reports he had eaten some fried chicken, and was cleaning up, gradually developed some burning and bloating type of abdominal pain rating up into his chest.  Seems to be improved now, no radiation to his back to his arms.  No real alleviating or aggravating factors, is not exertional or pleuritic.  He has had no shortness of breath, no fevers chills cough or congestion.  No leg pain or swelling.  No nausea or vomiting.  No diaphoresis.  Has prior history of similar and GERD, although has not gotten his Pepcid prescription filled yet    He does have a history of hypertension and diabetes, Has no history of cigarette smoking,drug use, dyslipidemia, family history of early CAD, history of CAD.      REVIEW OF SYSTEMS  Pertinent positives include abdominal pain. Pertinent negatives include no fever. See HPI for details. All other systems reviewed and are negative.    PAST MEDICAL HISTORY   has a past medical history of Angina, Diabetes (HCC), Hepatitis C (5/24/1996), HTN (hypertension), benign (4/22/2008), Psychiatric disorder, PTSD (post-traumatic stress disorder), Schizo affective schizophrenia (HCC) (4/22/2010), Schizophrenia, schizo-affective type (HCC), and Tuberculosis (4/22/1992).    SURGICAL HISTORY   has a past surgical history that includes gastroscopy with biopsy (9/3/2010); other abdominal surgery (1982); gastroscopy with biopsy (11/7/2010); and other  orthopedic surgery (2000).    FAMILY HISTORY  Family History   Problem Relation Age of Onset   • Genetic Disorder Neg Hx        SOCIAL HISTORY  Social History     Socioeconomic History   • Marital status: Single     Spouse name: Not on file   • Number of children: Not on file   • Years of education: Not on file   • Highest education level: Not on file   Occupational History   • Not on file   Social Needs   • Financial resource strain: Not on file   • Food insecurity     Worry: Not on file     Inability: Not on file   • Transportation needs     Medical: Not on file     Non-medical: Not on file   Tobacco Use   • Smoking status: Current Every Day Smoker     Packs/day: 0.50     Years: 30.00     Pack years: 15.00     Types: Cigars   • Smokeless tobacco: Never Used   • Tobacco comment: 3/4 ppd x 30 years   Substance and Sexual Activity   • Alcohol use: Not Currently   • Drug use: No   • Sexual activity: Yes     Partners: Female   Lifestyle   • Physical activity     Days per week: Not on file     Minutes per session: Not on file   • Stress: Not on file   Relationships   • Social connections     Talks on phone: Not on file     Gets together: Not on file     Attends Gnosticism service: Not on file     Active member of club or organization: Not on file     Attends meetings of clubs or organizations: Not on file     Relationship status: Not on file   • Intimate partner violence     Fear of current or ex partner: Not on file     Emotionally abused: Not on file     Physically abused: Not on file     Forced sexual activity: Not on file   Other Topics Concern   • Not on file   Social History Narrative    ** Merged History Encounter **           Social History     Substance and Sexual Activity   Drug Use No       CURRENT MEDICATIONS  Home Medications     Reviewed by Ning Renae R.N. (Registered Nurse) on 08/01/20 at 2049  Med List Status: Not Addressed   Medication Last Dose Status   acetaminophen (TYLENOL) 500 MG Tab  Active    Baclofen 5 MG Tab  Active   benztropine (COGENTIN) 1 MG Tab  Active   benztropine (COGENTIN) 2 MG Tab  Active   divalproex ER (DEPAKOTE ER) 500 MG TABLET SR 24 HR  Active   docusate sodium (STOOL SOFTENER) 100 MG Cap  Active   INVEGA SUSTENNA 234 MG/1.5ML Suspension Prefilled Syringe  Active   metFORMIN (GLUCOPHAGE) 500 MG Tab  Active   olanzapine (ZYPREXA) 20 MG tablet  Active   pantoprazole (PROTONIX) 40 MG Tablet Delayed Response  Active   trihexyphenidyl (ARTANE) 2 MG Tab  Active                ALLERGIES  Allergies   Allergen Reactions   • Hctz      Hyponatremia     • Nsaids    • Other Drug Nausea     All anti-inflammitories,  Headache and body aches.   • Vicodin [Hydrocodone-Acetaminophen] Vomiting     Headaches   • Vicodin [Hydrocodone-Acetaminophen]    • Amoxicillin Nausea     Headache and body aches       PHYSICAL EXAM  VITAL SIGNS: /91   Pulse 81   Temp 37.1 °C (98.7 °F) (Temporal)   Resp 18   Ht 1.829 m (6')   Wt 88.9 kg (196 lb)   SpO2 96%   BMI 26.58 kg/m²   Pulse ox interpretation: I interpret this pulse ox as normal.    Constitutional: Alert in no apparent distress.  HENT: No signs of trauma, Bilateral external ears normal, Nose normal.   Eyes: Pupils are equal and reactive, Conjunctiva normal, Non-icteric.   Neck: Normal range of motion, No tenderness, Supple, No stridor.   Lymphatic: No lymphadenopathy noted.   Cardiovascular: Regular rate and rhythm, no murmurs.   Thorax & Lungs: Normal breath sounds, No respiratory distress, No wheezing, No chest tenderness.   Abdomen: Bowel sounds normal, Soft, No tenderness, No masses, No pulsatile masses. No peritoneal signs.  Skin: Warm, Dry, No erythema, No rash.   Back: No bony tenderness, No CVA tenderness.   Extremities: Intact distal pulses, No edema, No tenderness, No cyanosis, Negative Enoch's sign.  Musculoskeletal: Good range of motion in all major joints. No tenderness to palpation or major deformities noted.   Neurologic: Alert ,  Normal motor function, Normal sensory function, No focal deficits noted.   Psychiatric: Affect normal, Judgment normal, Mood normal.     DIAGNOSTIC STUDIES / PROCEDURES    EKG  Results for orders placed or performed during the hospital encounter of 08/01/20   CBC WITH DIFFERENTIAL   Result Value Ref Range    WBC 4.4 (L) 4.8 - 10.8 K/uL    RBC 4.28 (L) 4.70 - 6.10 M/uL    Hemoglobin 13.6 (L) 14.0 - 18.0 g/dL    Hematocrit 38.3 (L) 42.0 - 52.0 %    MCV 89.5 81.4 - 97.8 fL    MCH 31.8 27.0 - 33.0 pg    MCHC 35.5 (H) 33.7 - 35.3 g/dL    RDW 43.3 35.9 - 50.0 fL    Platelet Count 167 164 - 446 K/uL    MPV 9.5 9.0 - 12.9 fL    Neutrophils-Polys 42.60 (L) 44.00 - 72.00 %    Lymphocytes 43.00 (H) 22.00 - 41.00 %    Monocytes 12.60 0.00 - 13.40 %    Eosinophils 1.10 0.00 - 6.90 %    Basophils 0.50 0.00 - 1.80 %    Immature Granulocytes 0.20 0.00 - 0.90 %    Nucleated RBC 0.00 /100 WBC    Neutrophils (Absolute) 1.86 1.82 - 7.42 K/uL    Lymphs (Absolute) 1.88 1.00 - 4.80 K/uL    Monos (Absolute) 0.55 0.00 - 0.85 K/uL    Eos (Absolute) 0.05 0.00 - 0.51 K/uL    Baso (Absolute) 0.02 0.00 - 0.12 K/uL    Immature Granulocytes (abs) 0.01 0.00 - 0.11 K/uL    NRBC (Absolute) 0.00 K/uL   COMP METABOLIC PANEL   Result Value Ref Range    Sodium 126 (L) 135 - 145 mmol/L    Potassium 4.2 3.6 - 5.5 mmol/L    Chloride 94 (L) 96 - 112 mmol/L    Co2 20 20 - 33 mmol/L    Anion Gap 12.0 7.0 - 16.0    Glucose 108 (H) 65 - 99 mg/dL    Bun 7 (L) 8 - 22 mg/dL    Creatinine 0.94 0.50 - 1.40 mg/dL    Calcium 8.9 8.5 - 10.5 mg/dL    AST(SGOT) 21 12 - 45 U/L    ALT(SGPT) 14 2 - 50 U/L    Alkaline Phosphatase 79 30 - 99 U/L    Total Bilirubin 0.3 0.1 - 1.5 mg/dL    Albumin 4.3 3.2 - 4.9 g/dL    Total Protein 7.0 6.0 - 8.2 g/dL    Globulin 2.7 1.9 - 3.5 g/dL    A-G Ratio 1.6 g/dL   LIPASE   Result Value Ref Range    Lipase 35 11 - 82 U/L   TROPONIN   Result Value Ref Range    Troponin T 7 6 - 19 ng/L   ESTIMATED GFR   Result Value Ref Range    GFR If  African American >60 >60 mL/min/1.73 m 2    GFR If Non African American >60 >60 mL/min/1.73 m 2   EKG (NOW)   Result Value Ref Range    Report       [facility] Emergency Dept.    Test Date:  2020  Pt Name:    JAYMIE SINGH                Department: ER  MRN:        [id number]                      Room:       [facility]  Gender:     Male                         Technician: [id number]  :        1965                   Requested By:ER TRIAGE PROTOCOL  Order #:    [id number]                    Reading MD: MAX DEAL MD    Measurements  Intervals                                Axis  Rate:       85                           P:          30  CA:         180                          QRS:        53  QRSD:       78                           T:          0  QT:         344  QTc:        409    Interpretive Statements  SINUS RHYTHM    Compared to ECG 2020 18:21:24    No change  Electronically Signed On 2020 20:24:36 PDT by MAX DEAL MD           RADIOLOGY  DX-CHEST-PORTABLE (1 VIEW)   Final Result         Grossly unchanged peripheral opacity in the right lung base could relate to known calcified pleural plaques. Superimposed infection cannot be excluded.        The radiologist's interpretation of all radiological studies have been reviewed and images independently viewed by me.    COURSE & MEDICAL DECISION MAKING  Nursing notes, VS, PMSFHx reviewed in chart.    7:37 PM Patient seen and examined at bedside. Patient will be treated with GI cocktail. Ordered for cbc, cmp, lipase, cxr, ecg to evaluate his symptoms.     Review patient records he has had 3 similar visits to the emergency department this month as well as saw cardiology and his primary care doctor yesterday    Sodium 126 today, chronically low, 121 2 days ago 122 3 days ago    8:49 PM  Patient reevaluated, updated on results he is comfortable and symptom-free at this time, plan for discharge      Decision Making:  This is a 55  y.o. male present with epigastric pain.  He is overall quite well-appearing and symptoms do seem consistent with GERD.  He was given a prescription for Prilosec prior, although he states he has not had this filled and does not have the prescription.  I have sent this into his mail delivery pharmacy.  He has a benign abdominal exam without suggestion of cholecystitis or acute surgical process.  His lipase is normal.  I do not think this represents acute coronary syndrome given the nature of symptoms, unremarkable troponin, ECG as well as multiple prior presentations with similar symptoms and unremarkable work-up.    The patient will return for new or worsening symptoms and is stable at the time of discharge.    The patient is referred to a primary physician for blood pressure management, diabetic screening, and for all other preventative health concerns.    DISPOSITION:  Patient will be discharged home in stable condition.    FOLLOW UP:  Librado Colin M.D.  [address] [place] [id number]  [phone]      As needed      OUTPATIENT MEDICATIONS:  New Prescriptions    OMEPRAZOLE (PRILOSEC) 40 MG DELAYED-RELEASE CAPSULE    Take 1 Cap by mouth every day.         FINAL IMPRESSION  1. Gastroesophageal reflux disease, esophagitis presence not specified    2. Epigastric abdominal pain         The note accurately reflects work and decisions made by me.  Librado Lorenzo M.D.  8/1/2020  8:52 PM

## 2020-08-02 NOTE — ED NOTES
MD at bedside prior to discharge. Pt given discharge instructions and verbalized understanding with prescription stapled to discharge paperwork, all questions are answered, signed copy in chart. [facility], discharged to self with taxi voucher. Pt took all belongings from room.

## 2020-08-02 NOTE — ED TRIAGE NOTES
"Chief Complaint   Patient presents with   • Chest Pain     Pt reports eating fried chicken about an hour ago and started to experience chest pain.He reports his pain is still there, but has reported that it is more in his abdomen. Pt has hx of GERD and has not gotten his pepcid prescription filled. Pt denies n/v/d, h/a, n/v, blurred vision. aaox4.     /83   Pulse 93   Temp 37 °C (98.6 °F) (Temporal)   Resp 18   Ht 1.829 m (6')   Wt 88.9 kg (196 lb)   SpO2 96%   BMI 26.58 kg/m²     Pt is aaox4, wearing a mask and resting on RA. He reports \"the pain is in my abdomen now, and \"it feels like it wants to grow\". He reports his pain as burning like \"a volcano that has activated\".     Ekg obtained, Erp to see.    Chart up for eval.    "

## 2020-08-02 NOTE — ED NOTES
Attempting to find the pt a ride back to his care home. Attempt to call  with no success, will try again shortly.

## 2020-08-02 NOTE — DISCHARGE PLANNING
LSW notified by bedside RN that pt is in need of transportation home.  Pt confirmed address is correct on facesheet and that he has a way of entering his home.  Taxi voucher provided #[id number] from [facility] to [address] [place] [address]

## 2020-08-17 ENCOUNTER — HOSPITAL ENCOUNTER (OUTPATIENT)
Dept: RADIOLOGY | Facility: MEDICAL CENTER | Age: 55
End: 2020-08-17
Attending: INTERNAL MEDICINE
Payer: MEDICARE

## 2020-08-17 DIAGNOSIS — R07.89 OTHER CHEST PAIN: ICD-10-CM

## 2020-08-17 PROCEDURE — 700111 HCHG RX REV CODE 636 W/ 250 OVERRIDE (IP)

## 2020-08-17 PROCEDURE — A9502 TC99M TETROFOSMIN: HCPCS | Mod: MG

## 2020-08-17 RX ORDER — REGADENOSON 0.08 MG/ML
0.4 INJECTION, SOLUTION INTRAVENOUS ONCE
Status: COMPLETED | OUTPATIENT
Start: 2020-08-17 | End: 2020-08-17

## 2020-08-17 RX ORDER — REGADENOSON 0.08 MG/ML
INJECTION, SOLUTION INTRAVENOUS
Status: COMPLETED
Start: 2020-08-17 | End: 2020-08-17

## 2020-08-17 RX ADMIN — REGADENOSON 0.4 MG: 0.08 INJECTION, SOLUTION INTRAVENOUS at 11:59

## 2020-08-18 ENCOUNTER — TELEPHONE (OUTPATIENT)
Dept: CARDIOLOGY | Facility: MEDICAL CENTER | Age: 55
End: 2020-08-18

## 2020-08-18 PROCEDURE — 78452 HT MUSCLE IMAGE SPECT MULT: CPT | Mod: 26 | Performed by: INTERNAL MEDICINE

## 2020-08-18 PROCEDURE — 93018 CV STRESS TEST I&R ONLY: CPT | Performed by: INTERNAL MEDICINE

## 2020-08-18 RX ORDER — DOCUSATE SODIUM 100 MG
CAPSULE ORAL
Qty: 56 CAP | Refills: 11 | Status: SHIPPED | OUTPATIENT
Start: 2020-08-18 | End: 2023-07-16

## 2020-08-18 NOTE — TELEPHONE ENCOUNTER
NM-CARDIAC STRESS TEST  BRIDGETT Russo R.N.             Dear My,     Can you please let Haile Chavez know that result is ok and I will see patient as scheduled?     Thank you,   Dilan.        Called pt and notified of stress test results.

## 2020-08-18 NOTE — RESULT ENCOUNTER NOTE
Dear My,    Can you please let Haile Chavez know that result is ok and I will see patient as scheduled?    Thank you,  Dilan.

## 2020-09-05 ENCOUNTER — APPOINTMENT (OUTPATIENT)
Dept: RADIOLOGY | Facility: MEDICAL CENTER | Age: 55
End: 2020-09-05
Attending: EMERGENCY MEDICINE
Payer: MEDICARE

## 2020-09-05 ENCOUNTER — HOSPITAL ENCOUNTER (EMERGENCY)
Facility: MEDICAL CENTER | Age: 55
End: 2020-09-05
Attending: EMERGENCY MEDICINE
Payer: MEDICARE

## 2020-09-05 VITALS
BODY MASS INDEX: 27.02 KG/M2 | HEART RATE: 79 BPM | TEMPERATURE: 97.7 F | SYSTOLIC BLOOD PRESSURE: 130 MMHG | HEIGHT: 72 IN | WEIGHT: 199.52 LBS | RESPIRATION RATE: 23 BRPM | DIASTOLIC BLOOD PRESSURE: 87 MMHG | OXYGEN SATURATION: 98 %

## 2020-09-05 DIAGNOSIS — K21.9 GASTROESOPHAGEAL REFLUX DISEASE WITHOUT ESOPHAGITIS: ICD-10-CM

## 2020-09-05 LAB
ALBUMIN SERPL BCP-MCNC: 4.6 G/DL (ref 3.2–4.9)
ALBUMIN/GLOB SERPL: 1.8 G/DL
ALP SERPL-CCNC: 68 U/L (ref 30–99)
ALT SERPL-CCNC: 10 U/L (ref 2–50)
ANION GAP SERPL CALC-SCNC: 14 MMOL/L (ref 7–16)
AST SERPL-CCNC: 13 U/L (ref 12–45)
BASOPHILS # BLD AUTO: 0.7 % (ref 0–1.8)
BASOPHILS # BLD: 0.04 K/UL (ref 0–0.12)
BILIRUB SERPL-MCNC: 0.3 MG/DL (ref 0.1–1.5)
BUN SERPL-MCNC: 5 MG/DL (ref 8–22)
CALCIUM SERPL-MCNC: 9.4 MG/DL (ref 8.5–10.5)
CHLORIDE SERPL-SCNC: 101 MMOL/L (ref 96–112)
CO2 SERPL-SCNC: 20 MMOL/L (ref 20–33)
CREAT SERPL-MCNC: 0.79 MG/DL (ref 0.5–1.4)
EKG IMPRESSION: NORMAL
EOSINOPHIL # BLD AUTO: 1.5 K/UL (ref 0–0.51)
EOSINOPHIL NFR BLD: 26.7 % (ref 0–6.9)
ERYTHROCYTE [DISTWIDTH] IN BLOOD BY AUTOMATED COUNT: 43.7 FL (ref 35.9–50)
GLOBULIN SER CALC-MCNC: 2.6 G/DL (ref 1.9–3.5)
GLUCOSE SERPL-MCNC: 84 MG/DL (ref 65–99)
HCT VFR BLD AUTO: 37.4 % (ref 42–52)
HGB BLD-MCNC: 13.3 G/DL (ref 14–18)
IMM GRANULOCYTES # BLD AUTO: 0.01 K/UL (ref 0–0.11)
IMM GRANULOCYTES NFR BLD AUTO: 0.2 % (ref 0–0.9)
LYMPHOCYTES # BLD AUTO: 1.47 K/UL (ref 1–4.8)
LYMPHOCYTES NFR BLD: 26.2 % (ref 22–41)
MCH RBC QN AUTO: 31.9 PG (ref 27–33)
MCHC RBC AUTO-ENTMCNC: 35.6 G/DL (ref 33.7–35.3)
MCV RBC AUTO: 89.7 FL (ref 81.4–97.8)
MONOCYTES # BLD AUTO: 0.6 K/UL (ref 0–0.85)
MONOCYTES NFR BLD AUTO: 10.7 % (ref 0–13.4)
NEUTROPHILS # BLD AUTO: 1.99 K/UL (ref 1.82–7.42)
NEUTROPHILS NFR BLD: 35.5 % (ref 44–72)
NRBC # BLD AUTO: 0 K/UL
NRBC BLD-RTO: 0 /100 WBC
PLATELET # BLD AUTO: 195 K/UL (ref 164–446)
PMV BLD AUTO: 9.4 FL (ref 9–12.9)
POTASSIUM SERPL-SCNC: 4.4 MMOL/L (ref 3.6–5.5)
PROT SERPL-MCNC: 7.2 G/DL (ref 6–8.2)
RBC # BLD AUTO: 4.17 M/UL (ref 4.7–6.1)
SODIUM SERPL-SCNC: 135 MMOL/L (ref 135–145)
TROPONIN T SERPL-MCNC: 8 NG/L (ref 6–19)
WBC # BLD AUTO: 5.6 K/UL (ref 4.8–10.8)

## 2020-09-05 PROCEDURE — 93005 ELECTROCARDIOGRAM TRACING: CPT

## 2020-09-05 PROCEDURE — 700111 HCHG RX REV CODE 636 W/ 250 OVERRIDE (IP): Performed by: EMERGENCY MEDICINE

## 2020-09-05 PROCEDURE — 96374 THER/PROPH/DIAG INJ IV PUSH: CPT

## 2020-09-05 PROCEDURE — A9270 NON-COVERED ITEM OR SERVICE: HCPCS | Performed by: EMERGENCY MEDICINE

## 2020-09-05 PROCEDURE — 93005 ELECTROCARDIOGRAM TRACING: CPT | Performed by: EMERGENCY MEDICINE

## 2020-09-05 PROCEDURE — 700102 HCHG RX REV CODE 250 W/ 637 OVERRIDE(OP): Performed by: EMERGENCY MEDICINE

## 2020-09-05 PROCEDURE — 80053 COMPREHEN METABOLIC PANEL: CPT

## 2020-09-05 PROCEDURE — 71045 X-RAY EXAM CHEST 1 VIEW: CPT

## 2020-09-05 PROCEDURE — 99285 EMERGENCY DEPT VISIT HI MDM: CPT

## 2020-09-05 PROCEDURE — 85025 COMPLETE CBC W/AUTO DIFF WBC: CPT

## 2020-09-05 PROCEDURE — 84484 ASSAY OF TROPONIN QUANT: CPT

## 2020-09-05 RX ORDER — OMEPRAZOLE 20 MG/1
20 CAPSULE, DELAYED RELEASE ORAL DAILY
Qty: 30 CAP | Refills: 0 | Status: SHIPPED
Start: 2020-09-05 | End: 2023-04-11

## 2020-09-05 RX ORDER — ONDANSETRON 2 MG/ML
4 INJECTION INTRAMUSCULAR; INTRAVENOUS ONCE
Status: COMPLETED | OUTPATIENT
Start: 2020-09-05 | End: 2020-09-05

## 2020-09-05 RX ADMIN — ONDANSETRON 4 MG: 2 INJECTION INTRAMUSCULAR; INTRAVENOUS at 13:28

## 2020-09-05 RX ADMIN — LIDOCAINE HYDROCHLORIDE 15 ML: 20 SOLUTION OROPHARYNGEAL at 13:28

## 2020-09-05 ASSESSMENT — FIBROSIS 4 INDEX: FIB4 SCORE: 1.92

## 2020-09-05 NOTE — ED TRIAGE NOTES
".  Chief Complaint   Patient presents with   • Chest Pain     Center/midepigastric region.  Non radiating.  Onset 0900 this morning.    • Gastrophageal Reflux     \" I was told it was acid reflux, I need Omeprazole.\"    Pt BIB EMS from [facility].  Pt states \" I need the Omeprazole, don't know what the staff is doing. \"  Pt out of Omeprazole since Thursday.  Chest pain \" started at 9:00, I was sitting down, \" non-radiating.   No difficulty breathing.  No N/V.  Fever/chills?      Pt educated on the triage process.  Pt was instructed to notify staff for any worsening symptoms.  Pt denies any recent travel, denies exposure to COVID positive individuals.  Pt also denies any respiratory or flu like symptoms at this time.  Mask in place.     "

## 2020-09-05 NOTE — ED PROVIDER NOTES
"ED Provider Note    CHIEF COMPLAINT  Chief Complaint   Patient presents with   • Chest Pain     Center/midepigastric region.  Non radiating.  Onset 0900 this morning.    • Gastrophageal Reflux     \" I was told it was acid reflux, I need Omeprazole.\"        HPI  Haile Chavez is a 55 y.o. male here for evaluation of esophageal reflux.  Patient states that he has a history of reflux, and this feels the same way.  He has some midsternal chest pain, that is nonradiating to his back, and he has no abdominal pain.  He has no shortness of breath, and no abdominal pain.  Patient states that pain is worse after he eats, and began to \"creep up\" but is always relieved with antacids.  Patient has run out of his omeprazole, and states that he needs a refill on his prescription.  Food makes it worse, and it is alleviated after omeprazole.  Patient describes as a burning sensation.  He has had this many times in the past.        ROS  See HPI for further details, o/w negative.     PAST MEDICAL HISTORY   has a past medical history of Angina, Diabetes (HCC), Hepatitis C (5/24/1996), HTN (hypertension), benign (4/22/2008), Psychiatric disorder, PTSD (post-traumatic stress disorder), Schizo affective schizophrenia (HCC) (4/22/2010), Schizophrenia, schizo-affective type (HCC), and Tuberculosis (4/22/1992).    SOCIAL HISTORY  Social History     Tobacco Use   • Smoking status: Current Every Day Smoker     Packs/day: 0.50     Years: 30.00     Pack years: 15.00     Types: Cigars   • Smokeless tobacco: Never Used   • Tobacco comment: \" 5 - 7 cigarettes a day. \"    Substance and Sexual Activity   • Alcohol use: Not Currently   • Drug use: No   • Sexual activity: Yes     Partners: Female       Family History  No bleeding disorders    SURGICAL HISTORY   has a past surgical history that includes gastroscopy with biopsy (9/3/2010); other abdominal surgery (1982); gastroscopy with biopsy (11/7/2010); and other orthopedic surgery " (2000).    CURRENT MEDICATIONS  Home Medications    **Home medications have not yet been reviewed for this encounter**         ALLERGIES  Allergies   Allergen Reactions   • Hctz      Hyponatremia     • Nsaids    • Other Drug Nausea     All anti-inflammitories,  Headache and body aches.   • Vicodin [Hydrocodone-Acetaminophen] Vomiting     Headaches   • Vicodin [Hydrocodone-Acetaminophen]    • Amoxicillin Nausea     Headache and body aches       REVIEW OF SYSTEMS  See HPI for further details. Review of systems as above, otherwise all other systems are negative.     PHYSICAL EXAM  Constitutional: Well developed, well nourished. No acute distress.  HEENT: Normocephalic, atraumatic. Posterior pharynx clear and moist.  Eyes:  EOMI. Normal sclera.  Neck: Supple, Full range of motion, nontender.  Chest/Pulmonary: clear to ausculation. Symmetrical expansion.   Cardio: Regular rate and rhythm with no murmur.   Abdomen: Soft, nontender. No peritoneal signs. No guarding. No palpable masses.  Musculoskeletal: No deformity, no edema, neurovascular intact.   Neuro: Clear speech, appropriate, cooperative, cranial nerves II-XII grossly intact.  Psych: Normal mood and affect      Results for orders placed or performed during the hospital encounter of 09/05/20   CBC w/ Differential   Result Value Ref Range    WBC 5.6 4.8 - 10.8 K/uL    RBC 4.17 (L) 4.70 - 6.10 M/uL    Hemoglobin 13.3 (L) 14.0 - 18.0 g/dL    Hematocrit 37.4 (L) 42.0 - 52.0 %    MCV 89.7 81.4 - 97.8 fL    MCH 31.9 27.0 - 33.0 pg    MCHC 35.6 (H) 33.7 - 35.3 g/dL    RDW 43.7 35.9 - 50.0 fL    Platelet Count 195 164 - 446 K/uL    MPV 9.4 9.0 - 12.9 fL    Neutrophils-Polys 35.50 (L) 44.00 - 72.00 %    Lymphocytes 26.20 22.00 - 41.00 %    Monocytes 10.70 0.00 - 13.40 %    Eosinophils 26.70 (H) 0.00 - 6.90 %    Basophils 0.70 0.00 - 1.80 %    Immature Granulocytes 0.20 0.00 - 0.90 %    Nucleated RBC 0.00 /100 WBC    Neutrophils (Absolute) 1.99 1.82 - 7.42 K/uL    Lymphs  (Absolute) 1.47 1.00 - 4.80 K/uL    Monos (Absolute) 0.60 0.00 - 0.85 K/uL    Eos (Absolute) 1.50 (H) 0.00 - 0.51 K/uL    Baso (Absolute) 0.04 0.00 - 0.12 K/uL    Immature Granulocytes (abs) 0.01 0.00 - 0.11 K/uL    NRBC (Absolute) 0.00 K/uL   Complete Metabolic Panel (CMP)   Result Value Ref Range    Sodium 135 135 - 145 mmol/L    Potassium 4.4 3.6 - 5.5 mmol/L    Chloride 101 96 - 112 mmol/L    Co2 20 20 - 33 mmol/L    Anion Gap 14.0 7.0 - 16.0    Glucose 84 65 - 99 mg/dL    Bun 5 (L) 8 - 22 mg/dL    Creatinine 0.79 0.50 - 1.40 mg/dL    Calcium 9.4 8.5 - 10.5 mg/dL    AST(SGOT) 13 12 - 45 U/L    ALT(SGPT) 10 2 - 50 U/L    Alkaline Phosphatase 68 30 - 99 U/L    Total Bilirubin 0.3 0.1 - 1.5 mg/dL    Albumin 4.6 3.2 - 4.9 g/dL    Total Protein 7.2 6.0 - 8.2 g/dL    Globulin 2.6 1.9 - 3.5 g/dL    A-G Ratio 1.8 g/dL   Troponin STAT   Result Value Ref Range    Troponin T 8 6 - 19 ng/L   ESTIMATED GFR   Result Value Ref Range    GFR If African American >60 >60 mL/min/1.73 m 2    GFR If Non African American >60 >60 mL/min/1.73 m 2   EKG (NOW)   Result Value Ref Range    Report       [facility] Emergency Dept[facility]    Test Date:  2020  Pt Name:    JAYMIE SINGH                Department: ER  MRN:        [id number]                      Room:  Gender:     Male                         Technician: [id number]  :        1965                   Requested By:ER TRIAGE PROTOCOL  Order #:    [id number]                    Reading MD:    Measurements  Intervals                                Axis  Rate:       93                           P:          50  AZ:         164                          QRS:        52  QRSD:       80                           T:          4  QT:         332  QTc:        413    Interpretive Statements  SINUS RHYTHM  MINIMAL ST DEPRESSION, ANTEROLATERAL LEADS  Compared to ECG 2020 19:30:38  ST (T wave) deviation now present       DX-CHEST-PORTABLE (1 VIEW)   Final Result      1.   Probable pleural calcifications again seen at the RIGHT lung base.   2.  No significant change from prior exam.           Ekg;  nsr 93. No st elevation, no st depression, no ectopy. qtc 413. Pr 164    PROCEDURES     MEDICAL RECORD  I have reviewed patient's medical record and pertinent results are listed.    COURSE & MEDICAL DECISION MAKING  I have reviewed any medical record information, laboratory studies and radiographic results as noted above.    2:54 PM  At this time, the patient's cardiac work-up is negative, he also had a normal stress test August 17 of this year.  Patient has a heart score of 1.  Patient currently has no pain after the GI cocktail, a normal troponin, unremarkable EKG, and unchanged chest x-ray.  He would like to go home, and he will  his prescription for omeprazole.    If you have had any blood pressure issues while here in the emergency department, please see your doctor for a further evaluation or work up.    Differential diagnoses include but not limited to: mi, pe, ptx, reflux.     This patient presents with refulx .  At this time, I have counseled the patient/family regarding their medications, pain control, and follow up.  They will continue their medications, if any, as prescribed.  They will return immediately for any worsening symptoms and/or any other medical concerns.  They will see their doctor, or contact the doctor provided, in 1-2 days for follow up.       FINAL IMPRESSION  1. Gastroesophageal reflux disease without esophagitis             Electronically signed by: Fritz Cardona D.O., 9/5/2020 2:51 PM

## 2020-09-05 NOTE — ED NOTES
Pt Dc home with instructions on reflux. Pt provided 1 written prescription and education on how to take that medication. Ambulatory to lobby on DC

## 2020-09-07 ENCOUNTER — HOSPITAL ENCOUNTER (EMERGENCY)
Facility: MEDICAL CENTER | Age: 55
End: 2020-09-08
Attending: EMERGENCY MEDICINE
Payer: MEDICARE

## 2020-09-07 DIAGNOSIS — F41.9 ANXIETY: ICD-10-CM

## 2020-09-07 DIAGNOSIS — R12 HEARTBURN: ICD-10-CM

## 2020-09-07 PROCEDURE — 700102 HCHG RX REV CODE 250 W/ 637 OVERRIDE(OP): Performed by: EMERGENCY MEDICINE

## 2020-09-07 PROCEDURE — 99284 EMERGENCY DEPT VISIT MOD MDM: CPT

## 2020-09-07 PROCEDURE — A9270 NON-COVERED ITEM OR SERVICE: HCPCS | Performed by: EMERGENCY MEDICINE

## 2020-09-07 RX ORDER — LORAZEPAM 1 MG/1
1 TABLET ORAL ONCE
Status: COMPLETED | OUTPATIENT
Start: 2020-09-07 | End: 2020-09-07

## 2020-09-07 RX ORDER — OMEPRAZOLE 20 MG/1
20 CAPSULE, DELAYED RELEASE ORAL ONCE
Status: COMPLETED | OUTPATIENT
Start: 2020-09-07 | End: 2020-09-07

## 2020-09-07 RX ADMIN — OMEPRAZOLE 20 MG: 20 CAPSULE, DELAYED RELEASE ORAL at 22:37

## 2020-09-07 RX ADMIN — LIDOCAINE HYDROCHLORIDE 30 ML: 20 SOLUTION OROPHARYNGEAL at 22:37

## 2020-09-07 RX ADMIN — LORAZEPAM 1 MG: 1 TABLET ORAL at 22:37

## 2020-09-07 ASSESSMENT — FIBROSIS 4 INDEX: FIB4 SCORE: 1.2

## 2020-09-07 ASSESSMENT — ENCOUNTER SYMPTOMS
NAUSEA: 1
NERVOUS/ANXIOUS: 1
ABDOMINAL PAIN: 1

## 2020-09-07 ASSESSMENT — PAIN DESCRIPTION - PAIN TYPE: TYPE: CHRONIC PAIN

## 2020-09-08 ENCOUNTER — HOSPITAL ENCOUNTER (EMERGENCY)
Facility: MEDICAL CENTER | Age: 55
End: 2020-09-08
Attending: EMERGENCY MEDICINE
Payer: MEDICARE

## 2020-09-08 VITALS
HEART RATE: 88 BPM | SYSTOLIC BLOOD PRESSURE: 115 MMHG | OXYGEN SATURATION: 98 % | BODY MASS INDEX: 27.39 KG/M2 | RESPIRATION RATE: 17 BRPM | DIASTOLIC BLOOD PRESSURE: 74 MMHG | WEIGHT: 201.94 LBS | TEMPERATURE: 98 F

## 2020-09-08 VITALS
TEMPERATURE: 98.2 F | DIASTOLIC BLOOD PRESSURE: 82 MMHG | SYSTOLIC BLOOD PRESSURE: 133 MMHG | WEIGHT: 199 LBS | HEIGHT: 72 IN | BODY MASS INDEX: 26.95 KG/M2 | RESPIRATION RATE: 18 BRPM | OXYGEN SATURATION: 97 % | HEART RATE: 68 BPM

## 2020-09-08 DIAGNOSIS — Z76.0 MEDICATION REFILL: ICD-10-CM

## 2020-09-08 DIAGNOSIS — K21.9 GASTROESOPHAGEAL REFLUX DISEASE, ESOPHAGITIS PRESENCE NOT SPECIFIED: ICD-10-CM

## 2020-09-08 PROCEDURE — A9270 NON-COVERED ITEM OR SERVICE: HCPCS | Performed by: EMERGENCY MEDICINE

## 2020-09-08 PROCEDURE — 700102 HCHG RX REV CODE 250 W/ 637 OVERRIDE(OP): Performed by: EMERGENCY MEDICINE

## 2020-09-08 PROCEDURE — 99283 EMERGENCY DEPT VISIT LOW MDM: CPT

## 2020-09-08 RX ORDER — OMEPRAZOLE 20 MG/1
20 CAPSULE, DELAYED RELEASE ORAL DAILY
Qty: 30 CAP | Refills: 0 | Status: SHIPPED | OUTPATIENT
Start: 2020-09-08 | End: 2023-04-11

## 2020-09-08 RX ADMIN — LIDOCAINE HYDROCHLORIDE 30 ML: 20 SOLUTION OROPHARYNGEAL at 17:29

## 2020-09-08 ASSESSMENT — ENCOUNTER SYMPTOMS
MYALGIAS: 1
FEVER: 0
CHILLS: 0

## 2020-09-08 ASSESSMENT — FIBROSIS 4 INDEX: FIB4 SCORE: 1.2

## 2020-09-08 NOTE — DISCHARGE PLANNING
Medical Social Work    LSW spoke w/ ERP and pt is medically clear to return and pt agreeable to returning to [facility].     LSW contacted [facility] ([phone]) and explained that pt was medically clear and pt agreeable to return to the house. Pt cleared to dc back to [facility] via taxi cab. LSW provided taxi voucher #[phone] to assist w/ pt transport back to [address] [place] [address] [place].

## 2020-09-08 NOTE — ED NOTES
Discharge teaching and paperwork provided regarding anxiety and all questions/concerns answered. Patient provided a cup of coffee and assisted with getting dressed. VSS, psychosocial assessment stable and patient up to bathroom with steady gait. Given information regarding anxiety and home care. Patient discharged to the care of himself and ambulated out of the ED. The patient was provided a taxi voucher and a taxi was called for him.

## 2020-09-08 NOTE — ED NOTES
Rounded on patient who is still sedated after medication administration earlier. Patient on the monitor, VSS.

## 2020-09-08 NOTE — ED TRIAGE NOTES
Pt comes in complaining of GERD. Pt currently at [facility] and staff told him they don't have his medication. Pt requesting Prilosec

## 2020-09-08 NOTE — ED PROVIDER NOTES
"ED Provider Note    ER PROVIDER NOTE      CHIEF COMPLAINT  Chief Complaint   Patient presents with   • Gastrophageal Reflux       HPI  Haile Chavez is a 55 y.o. male who presents to the emergency department complaining of need for medication refill.  Patient has history of reflux, and recently started residence at a new group home, was unable to get his medication without a new prescription.  States he is on omeprazole 20 mg daily which works well for him.  No other complaints, abdominal pain nausea or vomiting, blood in his stool or dark tarry stool.  No fevers chills cough congestion or other recent illness    REVIEW OF SYSTEMS  Pertinent positives include medication refill. Pertinent negatives include no abdominal pain. See HPI for details. All other systems reviewed and are negative.    PAST MEDICAL HISTORY   has a past medical history of Angina, Diabetes (HCC), Hepatitis C (5/24/1996), HTN (hypertension), benign (4/22/2008), Psychiatric disorder, PTSD (post-traumatic stress disorder), Schizo affective schizophrenia (HCC) (4/22/2010), Schizophrenia, schizo-affective type (HCC), and Tuberculosis (4/22/1992).    SOCIAL HISTORY  Social History     Tobacco Use   • Smoking status: Current Every Day Smoker     Packs/day: 0.50     Years: 30.00     Pack years: 15.00     Types: Cigars   • Smokeless tobacco: Never Used   • Tobacco comment: \" 5 - 7 cigarettes a day. \"    Substance Use Topics   • Alcohol use: Not Currently   • Drug use: No       SURGICAL HISTORY   has a past surgical history that includes gastroscopy with biopsy (9/3/2010); other abdominal surgery (1982); gastroscopy with biopsy (11/7/2010); and other orthopedic surgery (2000).    CURRENT MEDICATIONS  Home Medications    **Home medications have not yet been reviewed for this encounter**         ALLERGIES  Allergies   Allergen Reactions   • Hctz      Hyponatremia     • Nsaids    • Other Drug Nausea     All anti-inflammitories,  Headache and body aches. "   • Vicodin [Hydrocodone-Acetaminophen] Vomiting     Headaches   • Vicodin [Hydrocodone-Acetaminophen]    • Amoxicillin Nausea     Headache and body aches       PHYSICAL EXAM  VITAL SIGNS: /75   Pulse 93   Temp 36.5 °C (97.7 °F) (Temporal)   Resp 18   Wt 91.6 kg (201 lb 15.1 oz)   SpO2 97%   BMI 27.39 kg/m²   Pulse ox interpretation: I interpret this pulse ox as normal.    Constitutional: Alert.  In no apparent distress.  HENT: Normocephalic, Atraumatic, Bilateral external ears normal. Nose normal.   Eyes: Pupils are equal and reactive. Conjunctiva normal, non-icteric.   Heart: Regular rate and rhythm, no murmurs.    Lungs: Clear to auscultation bilaterally.  Skin: Warm, Dry, No erythema, No rash.   Musculoskeletal: No tenderness or major deformities noted. No edema.  Neurologic: Alert, Grossly non-focal.   Psychiatric: Affect normal, Judgment normal, Mood normal, Appears appropriate    DIAGNOSTIC STUDIES / PROCEDURES      RADIOLOGY  No orders to display     The radiologist's interpretation of all radiological studies have been reviewed and independently viewed by me.    COURSE & MEDICAL DECISION MAKING  Nursing notes, VS, PMSFHx reviewed in chart.    4:54 PM - Patient seen and examined at bedside. Patient will be treated with GI cocktail.   Decision Making:  This is a 55 y.o. male presenting for medication refill for his omeprazole.  He has been given this as well as a GI cocktail for today.  No complaints to suggest other acute pathology at this time    The patient will return for new or worsening symptoms and is stable at the time of discharge.    The patient is referred to a primary physician for blood pressure management, diabetic screening, and for all other preventative health concerns.        DISPOSITION:  Patient will be discharged home in stable condition.    FOLLOW UP:  Librado Colin M.D.  [address] [place] [address]  [place] [id number] [phone]      As needed      OUTPATIENT  MEDICATIONS:  New Prescriptions    OMEPRAZOLE (PRILOSEC) 20 MG DELAYED-RELEASE CAPSULE    Take 1 Cap by mouth every day.         FINAL IMPRESSION  1. Gastroesophageal reflux disease, esophagitis presence not specified    2. Medication refill        The note accurately reflects work and decisions made by me.  Librado Lorenzo M.D.  9/8/2020  4:56 PM
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
## 2020-09-08 NOTE — ED NOTES
Attempted to discharge patient and it was very difficult to arouse the patient. Once the patient was awake he was unable to safely walk independently. ERP and charge RN notified. Will continue to monitor.

## 2020-09-08 NOTE — DISCHARGE PLANNING
Medical Social Work    Referral: Discharge Planning    Intervention: LSW was notified of pt who currently resides at [facility] ([phone]). LSW contacted [facility] and spoke w/ Master who states that the pt is absolutely welcomed back once pt is medically clear. However, Master states that they prefer that the pt is transferred back to the [facility] via their transport in the morning to ensure that the pt returns. Master also reports that if the pt is prescribed any medications those would have to be filled tomorrow (09/08).     Plan: Pt can return to [facility] once medically clear. Note: transportation for pt will not be available until the morning.

## 2020-09-08 NOTE — ED NOTES
Patient rounded on who is sleeping and remains difficult to arouse. Per charge RN, the patient is able to continue to sleep until safe to ambulate and take taxi back to group home.

## 2020-09-13 ENCOUNTER — HOSPITAL ENCOUNTER (EMERGENCY)
Facility: MEDICAL CENTER | Age: 55
End: 2020-09-13
Attending: EMERGENCY MEDICINE
Payer: MEDICARE

## 2020-09-13 VITALS
DIASTOLIC BLOOD PRESSURE: 77 MMHG | SYSTOLIC BLOOD PRESSURE: 134 MMHG | TEMPERATURE: 98.1 F | RESPIRATION RATE: 18 BRPM | HEIGHT: 72 IN | HEART RATE: 77 BPM | WEIGHT: 202.82 LBS | BODY MASS INDEX: 27.47 KG/M2 | OXYGEN SATURATION: 98 %

## 2020-09-13 DIAGNOSIS — R30.0 DYSURIA: ICD-10-CM

## 2020-09-13 LAB
APPEARANCE UR: CLEAR
BILIRUB UR QL STRIP.AUTO: NEGATIVE
C TRACH DNA SPEC QL NAA+PROBE: NEGATIVE
COLOR UR: YELLOW
GLUCOSE UR STRIP.AUTO-MCNC: NEGATIVE MG/DL
KETONES UR STRIP.AUTO-MCNC: NEGATIVE MG/DL
LEUKOCYTE ESTERASE UR QL STRIP.AUTO: NEGATIVE
MICRO URNS: NORMAL
N GONORRHOEA DNA SPEC QL NAA+PROBE: NEGATIVE
NITRITE UR QL STRIP.AUTO: NEGATIVE
PH UR STRIP.AUTO: 6.5 [PH] (ref 5–8)
PROT UR QL STRIP: NEGATIVE MG/DL
RBC UR QL AUTO: NEGATIVE
SP GR UR STRIP.AUTO: 1.01
SPECIMEN SOURCE: NORMAL
UROBILINOGEN UR STRIP.AUTO-MCNC: 0.2 MG/DL

## 2020-09-13 PROCEDURE — 87491 CHLMYD TRACH DNA AMP PROBE: CPT

## 2020-09-13 PROCEDURE — 87591 N.GONORRHOEAE DNA AMP PROB: CPT

## 2020-09-13 PROCEDURE — 81003 URINALYSIS AUTO W/O SCOPE: CPT

## 2020-09-13 PROCEDURE — 99283 EMERGENCY DEPT VISIT LOW MDM: CPT

## 2020-09-13 PROCEDURE — 700102 HCHG RX REV CODE 250 W/ 637 OVERRIDE(OP): Performed by: EMERGENCY MEDICINE

## 2020-09-13 PROCEDURE — A9270 NON-COVERED ITEM OR SERVICE: HCPCS | Performed by: EMERGENCY MEDICINE

## 2020-09-13 RX ORDER — PHENAZOPYRIDINE HYDROCHLORIDE 200 MG/1
100 TABLET, FILM COATED ORAL ONCE
Status: COMPLETED | OUTPATIENT
Start: 2020-09-13 | End: 2020-09-13

## 2020-09-13 RX ORDER — PHENAZOPYRIDINE HYDROCHLORIDE 200 MG/1
200 TABLET, FILM COATED ORAL 3 TIMES DAILY PRN
Qty: 6 TAB | Refills: 0 | Status: SHIPPED | OUTPATIENT
Start: 2020-09-13 | End: 2023-07-16

## 2020-09-13 RX ADMIN — PHENAZOPYRIDINE HYDROCHLORIDE 100 MG: 200 TABLET ORAL at 05:29

## 2020-09-13 ASSESSMENT — FIBROSIS 4 INDEX: FIB4 SCORE: 1.2

## 2020-09-13 NOTE — ED NOTES
Break RN: pt provided urinal, emphasized importance of getting urine sample, pt verbalized understanding.

## 2020-09-13 NOTE — ED PROVIDER NOTES
"ED Provider Note    CHIEF COMPLAINT  Chief Complaint   Patient presents with   • Groin Pain     c/o lower abdominal pain ( groin area ) x 6-8 months . states \" I have hernia and it hurts \"         HPI  Haile Chavez is a 55 y.o. male who presents with chief complaint of lower abdominal pain for the last 6 to 8 years.  He reports that this pain is typically worse when he urinates.  He believes he may have a hernia.  Patient has a longstanding history of schizophrenia.  Patient denies any fevers or constitutional food.  He reports that occasionally it burns when he urinates.  He denies any penile discharge.  Patient denies any associated testicular pain or masses.  Patient denies any nausea or vomiting or changes in his bowel movements.  He reports that he is also had some increased urinary urgency.  Patient is been seen multiple times in the emergency department over the last few months for various complaints.  Patient's most recent laboratory results were unremarkable.      REVIEW OF SYSTEMS  ROS  See HPI for further details. All other systems are negative.     PAST MEDICAL HISTORY   has a past medical history of Angina, Diabetes (HCC), Hepatitis C (5/24/1996), HTN (hypertension), benign (4/22/2008), Psychiatric disorder, PTSD (post-traumatic stress disorder), Schizo affective schizophrenia (HCC) (4/22/2010), Schizophrenia, schizo-affective type (HCC), and Tuberculosis (4/22/1992).    SOCIAL HISTORY  Social History     Tobacco Use   • Smoking status: Current Every Day Smoker     Packs/day: 0.50     Years: 30.00     Pack years: 15.00     Types: Cigars   • Smokeless tobacco: Never Used   • Tobacco comment: \" 5 - 7 cigarettes a day. \"    Substance and Sexual Activity   • Alcohol use: Not Currently   • Drug use: No   • Sexual activity: Yes     Partners: Female       SURGICAL HISTORY   has a past surgical history that includes gastroscopy with biopsy (9/3/2010); other abdominal surgery (1982); gastroscopy with " biopsy (11/7/2010); and other orthopedic surgery (2000).    CURRENT MEDICATIONS  Home Medications     Reviewed by Shawn Herbert R.N. (Registered Nurse) on 09/13/20 at 0207  Med List Status: Partial   Medication Last Dose Status   acetaminophen (TYLENOL) 500 MG Tab  Active   Baclofen 5 MG Tab  Active   benztropine (COGENTIN) 1 MG Tab  Active   benztropine (COGENTIN) 2 MG Tab  Active   divalproex ER (DEPAKOTE ER) 500 MG TABLET SR 24 HR  Active   INVEGA SUSTENNA 234 MG/1.5ML Suspension Prefilled Syringe  Active   metFORMIN (GLUCOPHAGE) 500 MG Tab  Active   olanzapine (ZYPREXA) 20 MG tablet  Active   omeprazole (PRILOSEC) 20 MG delayed-release capsule  Active   omeprazole (PRILOSEC) 20 MG delayed-release capsule  Active   omeprazole (PRILOSEC) 40 MG delayed-release capsule  Active   pantoprazole (PROTONIX) 40 MG Tablet Delayed Response  Active   STOOL SOFTENER 100 MG Cap  Active   trihexyphenidyl (ARTANE) 2 MG Tab  Active                ALLERGIES  Allergies   Allergen Reactions   • Hctz      Hyponatremia     • Nsaids    • Other Drug Nausea     All anti-inflammitories,  Headache and body aches.   • Vicodin [Hydrocodone-Acetaminophen] Vomiting     Headaches   • Vicodin [Hydrocodone-Acetaminophen]    • Amoxicillin Nausea     Headache and body aches       PHYSICAL EXAM  Physical Exam   Constitutional: He is oriented to person, place, and time. He appears well-developed and well-nourished.   HENT:   Head: Normocephalic and atraumatic.   Eyes: Pupils are equal, round, and reactive to light. Conjunctivae are normal.   Neck: Normal range of motion. Neck supple.   Cardiovascular: Normal rate and regular rhythm. Exam reveals no gallop and no friction rub.   No murmur heard.  Pulmonary/Chest: Effort normal and breath sounds normal. No respiratory distress. He has no wheezes.   Abdominal: Soft. Bowel sounds are normal. He exhibits no distension. There is no abdominal tenderness. There is no rebound.   Genitourinary:    Genitourinary  Comments: Testicles are unremarkable, penis is unremarkable.  No associated inguinal mass or femoral mass.  No mass of the testicle.  Scrotum is unremarkable.     Neurological: He is alert and oriented to person, place, and time.   Skin: Skin is warm and dry.   Psychiatric: He has a normal mood and affect. His behavior is normal.     Results for orders placed or performed during the hospital encounter of 09/13/20   URINALYSIS,CULTURE IF INDICATED    Specimen: Urine, Clean Catch   Result Value Ref Range    Color Yellow     Character Clear     Specific Gravity 1.015 <1.035    Ph 6.5 5.0 - 8.0    Glucose Negative Negative mg/dL    Ketones Negative Negative mg/dL    Protein Negative Negative mg/dL    Bilirubin Negative Negative    Urobilinogen, Urine 0.2 Negative    Nitrite Negative Negative    Leukocyte Esterase Negative Negative    Occult Blood Negative Negative    Micro Urine Req see below    Chlamydia/GC PCR Urine Or Swab    Specimen: Urine, First Catch   Result Value Ref Range    Source Urine          COURSE & MEDICAL DECISION MAKING  Pertinent Labs & Imaging studies reviewed. (See chart for details)    Patient here with chronic dysuria.  Is very likely that this is a urinary tract infection given the chronicity.  Patient will need to follow-up with his primary care physician and urology.  In the interim we will give a short course of Pyridium.  Patient without any fevers.  He has a normal exam.  Will check urinalysis and GC chlamydia.  Patient's urinalysis is unremarkable.  He has been given a short course of Pyridium.  Return precautions discussed.  Follow-up with his primary care physician Dr. Colin.     The patient will return for worsening symptoms and is stable at the time of discharge. The patient verbalizes understanding and will comply.    FINAL IMPRESSION    1. Dysuria               Electronically signed by: Librado Quiros M.D., 9/13/2020 3:38 AM

## 2020-09-13 NOTE — ED TRIAGE NOTES
"Haile Chavez  55 y.o.  male  Chief Complaint   Patient presents with   • Groin Pain     c/o lower abdominal pain ( groin area ) x 6-8 months . states \" I have hernia and it hurts \"         "

## 2020-10-06 RX ORDER — BACLOFEN 5 MG/1
5 TABLET ORAL 2 TIMES DAILY
Qty: 56 TAB | Refills: 10 | Status: SHIPPED | OUTPATIENT
Start: 2020-10-06 | End: 2023-07-16

## 2020-10-23 ENCOUNTER — HOSPITAL ENCOUNTER (EMERGENCY)
Facility: MEDICAL CENTER | Age: 55
End: 2020-10-23
Payer: MEDICARE

## 2020-10-23 VITALS
TEMPERATURE: 97.5 F | OXYGEN SATURATION: 96 % | RESPIRATION RATE: 18 BRPM | SYSTOLIC BLOOD PRESSURE: 138 MMHG | WEIGHT: 201 LBS | HEART RATE: 115 BPM | BODY MASS INDEX: 27.22 KG/M2 | DIASTOLIC BLOOD PRESSURE: 87 MMHG | HEIGHT: 72 IN

## 2020-10-23 PROCEDURE — 302449 STATCHG TRIAGE ONLY (STATISTIC)

## 2020-10-23 ASSESSMENT — FIBROSIS 4 INDEX: FIB4 SCORE: 1.2

## 2020-10-23 NOTE — ED TRIAGE NOTES
"Pt to triage, c/o \" lump to his rt thigh\" states \" he thinks it's an inguinal hernia\" . \"Was seen here last month for same\"   "

## 2020-10-24 NOTE — ED NOTES
Patient did not answer when called from the lobby. Bathroom checked. ED tech will attempt again in 10 mins

## 2020-10-24 NOTE — ED NOTES
Patient recalled for second time. Still no answer. Bathroom checked, Senior lounge checked, oustside area checked. Will remove patient from lobby.
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
## 2021-03-15 DIAGNOSIS — Z23 NEED FOR VACCINATION: ICD-10-CM

## 2021-10-23 ENCOUNTER — HOSPITAL ENCOUNTER (EMERGENCY)
Facility: MEDICAL CENTER | Age: 56
End: 2021-10-23
Attending: EMERGENCY MEDICINE
Payer: MEDICARE

## 2021-10-23 VITALS
OXYGEN SATURATION: 98 % | SYSTOLIC BLOOD PRESSURE: 157 MMHG | HEART RATE: 79 BPM | RESPIRATION RATE: 14 BRPM | DIASTOLIC BLOOD PRESSURE: 94 MMHG | TEMPERATURE: 97.4 F

## 2021-10-23 DIAGNOSIS — F51.5 NIGHTMARES: ICD-10-CM

## 2021-10-23 LAB
AMPHET UR QL SCN: NEGATIVE
BARBITURATES UR QL SCN: NEGATIVE
BENZODIAZ UR QL SCN: NEGATIVE
BZE UR QL SCN: NEGATIVE
CANNABINOIDS UR QL SCN: NEGATIVE
METHADONE UR QL SCN: NEGATIVE
OPIATES UR QL SCN: NEGATIVE
OXYCODONE UR QL SCN: NEGATIVE
PCP UR QL SCN: NEGATIVE
POC BREATHALIZER: 0 PERCENT (ref 0–0.01)
PROPOXYPH UR QL SCN: NEGATIVE

## 2021-10-23 PROCEDURE — 99284 EMERGENCY DEPT VISIT MOD MDM: CPT

## 2021-10-23 PROCEDURE — 302970 POC BREATHALIZER: Performed by: EMERGENCY MEDICINE

## 2021-10-23 PROCEDURE — 80307 DRUG TEST PRSMV CHEM ANLYZR: CPT

## 2021-10-23 ASSESSMENT — ENCOUNTER SYMPTOMS
HEADACHES: 0
FEVER: 0
DEPRESSION: 0
COUGH: 0
ABDOMINAL PAIN: 0

## 2021-10-23 NOTE — ED PROVIDER NOTES
"ED Provider Note    ED Provider Note    Primary care provider: Librado Colin M.D.  Means of arrival: EMS  History obtained from: patient  History limited by: None    CHIEF COMPLAINT  Chief Complaint   Patient presents with   • Psych Eval       HPI  Haile Chavez is a 56 y.o. male who presents to the Emergency Department from [facility] where he apparently, receives mental health care.  Patient states that he has been having nightmares.  He does have a therapist at [facility].  He is notes he has been taking his medications.  He denies suicidal or homicidal ideations.  He reports that he has been in his normal state of health.  He denies pain.    REVIEW OF SYSTEMS  Review of Systems   Constitutional: Negative for fever.   Respiratory: Negative for cough.    Cardiovascular: Negative for chest pain.   Gastrointestinal: Negative for abdominal pain.   Neurological: Negative for headaches.   Psychiatric/Behavioral: Negative for depression and suicidal ideas.       PAST MEDICAL HISTORY   has a past medical history of Angina, Diabetes (HCC), Hepatitis C (5/24/1996), HTN (hypertension), benign (4/22/2008), Psychiatric disorder, PTSD (post-traumatic stress disorder), Schizo affective schizophrenia (HCC) (4/22/2010), Schizophrenia, schizo-affective type (HCC), and Tuberculosis (4/22/1992).    SURGICAL HISTORY   has a past surgical history that includes gastroscopy with biopsy (9/3/2010); other abdominal surgery (1982); gastroscopy with biopsy (11/7/2010); and other orthopedic surgery (2000).    SOCIAL HISTORY  Social History     Tobacco Use   • Smoking status: Current Every Day Smoker     Packs/day: 0.50     Years: 30.00     Pack years: 15.00     Types: Cigars   • Smokeless tobacco: Never Used   • Tobacco comment: \" 5 - 7 cigarettes a day. \"    Vaping Use   • Vaping Use: Never used   Substance Use Topics   • Alcohol use: Not Currently   • Drug use: No      Social History     Substance and Sexual Activity   Drug Use No "       FAMILY HISTORY  Family History   Problem Relation Age of Onset   • Genetic Disorder Neg Hx        CURRENT MEDICATIONS  Home Medications    **Home medications have not yet been reviewed for this encounter**         ALLERGIES  Allergies   Allergen Reactions   • Hctz      Hyponatremia     • Nsaids    • Other Drug Nausea     All anti-inflammitories,  Headache and body aches.   • Vicodin [Hydrocodone-Acetaminophen] Vomiting     Headaches   • Vicodin [Hydrocodone-Acetaminophen]    • Amoxicillin Nausea     Headache and body aches       PHYSICAL EXAM  VITAL SIGNS: /94   Pulse 79   Temp 36.3 °C (97.4 °F) (Temporal)   Resp 14   SpO2 98%   Vitals reviewed.  Constitutional: Patient is oriented to person, place, and time. Appears well-developed and well-nourished. No distress.    Head: Normocephalic and atraumatic.  Mouth/Throat: Oropharynx is clear and moist.  Eyes: Conjunctivae are normal. Pupils are equal and round.  Neck: Normal range of motion.  Cardiovascular: Normal rate, regular rhythm and normal heart sounds.  Pulmonary/Chest: Effort normal and breath sounds normal. No respiratory distress, no wheezes.  Musculoskeletal: No edema.  Neurological: No focal deficits.   Skin: Skin is warm and dry. No erythema. No pallor.   Psychiatric: Denies SI, HI.  Does not patient's last encounter was for groin pain.  This was September 2020.  Appear intoxicated    COURSE & MEDICAL DECISION MAKING  Pertinent Labs & Imaging studies reviewed. (See chart for details)    Obtained and reviewed past medical records.  Patient's last encounter was for groin pain in September 2020.  He was diagnosed with dysuria.    9:37 AM - Patient seen and examined at bedside.  This is a pleasant 56-year-old male.  He presents here complaining of nightmares.  He denies SI or HI.  He has a known history of schizophrenia.  He initially asked for a male physician then, said that he no longer wanted to be evaluated.  At this point, he does not meet  criteria for legal hold.  Urine drug screen was negative.  His breathalyzer was negative.  He is connected at [facility] and I think it is reasonable for him to be discharged to their to continue follow-up.  He is well-appearing and nontoxic with normal vital signs.  He is discharged in stable condition.      FINAL IMPRESSION  1. Nightmares

## 2021-10-23 NOTE — ED TRIAGE NOTES
"Pt BIB [facility] from [facility] with c/c of nightmares and trouble sleeping. Pt also reporting he does not like the other clients at [facility] because they are \"wacked\" pt also stating he doesn't like some of the staff. Pt rambling and hard to understand at times. Pt denies SI/HI  "

## 2021-10-23 NOTE — ED NOTES
Pt discharged home as ordered by erp. Pt reporting he will be walking back to his group home at [facility]. Pt verbalized understanding. Pt stating he will set up an appointment with his doctor. Pt left ambulating independently

## 2022-04-02 ENCOUNTER — HOSPITAL ENCOUNTER (EMERGENCY)
Facility: MEDICAL CENTER | Age: 57
End: 2022-04-02
Payer: MEDICARE

## 2022-04-02 VITALS
DIASTOLIC BLOOD PRESSURE: 85 MMHG | SYSTOLIC BLOOD PRESSURE: 131 MMHG | HEART RATE: 65 BPM | WEIGHT: 183.42 LBS | TEMPERATURE: 97.5 F | OXYGEN SATURATION: 94 % | HEIGHT: 73 IN | BODY MASS INDEX: 24.31 KG/M2 | RESPIRATION RATE: 16 BRPM

## 2022-04-02 LAB — EKG IMPRESSION: NORMAL

## 2022-04-02 PROCEDURE — 302449 STATCHG TRIAGE ONLY (STATISTIC)

## 2022-04-02 PROCEDURE — 93005 ELECTROCARDIOGRAM TRACING: CPT

## 2022-04-02 ASSESSMENT — FIBROSIS 4 INDEX: FIB4 SCORE: 1.24

## 2022-04-02 NOTE — ED TRIAGE NOTES
"Chief Complaint   Patient presents with   • Chest Pain     Pt bib [facility] c/o chest pain at 0600 describes as \"feels like there is a ocean in his chest\" was given aspirin 324mg by ems.     Rates pain as 6-7/10. Educated on triage process. Instructed to notify staff for any worsening symptoms. Denies any recent travel. Denies exposure to known covid positive patients. Denies any respiratory symptoms.   "

## 2022-04-03 NOTE — ED NOTES
Pt called multiple times by phlebotomist to get blood draw but no answer. Pt did not inform any staff if he going to leave

## 2022-05-10 ENCOUNTER — OFFICE VISIT (OUTPATIENT)
Dept: URGENT CARE | Facility: CLINIC | Age: 57
End: 2022-05-10
Payer: MEDICARE

## 2022-05-10 DIAGNOSIS — K04.7 DENTAL INFECTION: ICD-10-CM

## 2022-05-10 DIAGNOSIS — J02.9 PHARYNGITIS, UNSPECIFIED ETIOLOGY: ICD-10-CM

## 2022-05-10 PROCEDURE — 99214 OFFICE O/P EST MOD 30 MIN: CPT | Performed by: NURSE PRACTITIONER

## 2022-05-10 RX ORDER — CLINDAMYCIN HYDROCHLORIDE 300 MG/1
300 CAPSULE ORAL 3 TIMES DAILY
Qty: 30 CAPSULE | Refills: 0 | Status: SHIPPED | OUTPATIENT
Start: 2022-05-10 | End: 2022-05-20

## 2022-05-11 ENCOUNTER — HOSPITAL ENCOUNTER (EMERGENCY)
Facility: MEDICAL CENTER | Age: 57
End: 2022-05-11
Attending: EMERGENCY MEDICINE
Payer: MEDICARE

## 2022-05-11 VITALS
BODY MASS INDEX: 24.05 KG/M2 | SYSTOLIC BLOOD PRESSURE: 131 MMHG | HEART RATE: 78 BPM | TEMPERATURE: 99.2 F | DIASTOLIC BLOOD PRESSURE: 90 MMHG | RESPIRATION RATE: 16 BRPM | OXYGEN SATURATION: 98 % | WEIGHT: 181.44 LBS | HEIGHT: 73 IN

## 2022-05-11 VITALS
SYSTOLIC BLOOD PRESSURE: 135 MMHG | DIASTOLIC BLOOD PRESSURE: 70 MMHG | TEMPERATURE: 98 F | OXYGEN SATURATION: 98 % | HEART RATE: 87 BPM | RESPIRATION RATE: 16 BRPM

## 2022-05-11 DIAGNOSIS — J02.0 STREP THROAT: ICD-10-CM

## 2022-05-11 PROCEDURE — 99283 EMERGENCY DEPT VISIT LOW MDM: CPT

## 2022-05-11 PROCEDURE — 700111 HCHG RX REV CODE 636 W/ 250 OVERRIDE (IP): Performed by: EMERGENCY MEDICINE

## 2022-05-11 RX ORDER — ACETAMINOPHEN 325 MG/1
325 TABLET ORAL EVERY 4 HOURS PRN
Qty: 30 TABLET | Refills: 0 | Status: SHIPPED | OUTPATIENT
Start: 2022-05-11 | End: 2023-07-16

## 2022-05-11 RX ORDER — ACETAMINOPHEN 325 MG/1
325 TABLET ORAL EVERY 4 HOURS PRN
Qty: 30 TABLET | Refills: 0 | Status: SHIPPED | OUTPATIENT
Start: 2022-05-11 | End: 2022-05-11 | Stop reason: SDUPTHER

## 2022-05-11 RX ORDER — DEXAMETHASONE SODIUM PHOSPHATE 10 MG/ML
10 INJECTION, SOLUTION INTRAMUSCULAR; INTRAVENOUS ONCE
Status: COMPLETED | OUTPATIENT
Start: 2022-05-11 | End: 2022-05-11

## 2022-05-11 RX ADMIN — DEXAMETHASONE SODIUM PHOSPHATE 10 MG: 10 INJECTION INTRAMUSCULAR; INTRAVENOUS at 11:54

## 2022-05-11 ASSESSMENT — ENCOUNTER SYMPTOMS
CHILLS: 0
FEVER: 0
SHORTNESS OF BREATH: 0
SORE THROAT: 1
NAUSEA: 0
DIZZINESS: 0
VOMITING: 0
SINUS PRESSURE: 0
EYE PAIN: 0
MYALGIAS: 0

## 2022-05-11 ASSESSMENT — PAIN DESCRIPTION - PAIN TYPE: TYPE: ACUTE PAIN;CHRONIC PAIN

## 2022-05-11 ASSESSMENT — FIBROSIS 4 INDEX: FIB4 SCORE: 1.24

## 2022-05-11 NOTE — ED PROVIDER NOTES
"ED Provider Note    CHIEF COMPLAINT  Chief Complaint   Patient presents with   • Sore Throat     Dx with strep throat, on abx but requesting pain medication.  Pt reports also some dental problems and lower back pain.    • Dental Pain   • Low Back Pain     On disability.         HPI  Haile Chavez is a 57 y.o. male who presents with sore throat.  Sore throat started a few days ago.  Gradually got worse.  Seen in urgent care yesterday.  Had a strep screen that was positive.  Was given a prescription for amoxicillin that he started last night.  He states he still has pain and he wants something for pain.  He denies difficulty breathing.  He has increased pain with swallowing.  He has not had fever.  He has dental pain and no specific area.  He has chronic back pain but no other acute complaints.    REVIEW OF SYSTEMS  Pertinent negative: As above    PAST MEDICAL HISTORY  Past Medical History:   Diagnosis Date   • Angina    • Diabetes (HCC)    • Hepatitis C 5/24/1996   • HTN (hypertension), benign 4/22/2008   • Psychiatric disorder     Schizoaffective   • PTSD (post-traumatic stress disorder)    • Schizo affective schizophrenia (HCC) 4/22/2010   • Schizophrenia, schizo-affective type (HCC)    • Tuberculosis 4/22/1992       SOCIAL HISTORY  Social History     Tobacco Use   • Smoking status: Current Every Day Smoker     Packs/day: 0.50     Years: 30.00     Pack years: 15.00     Types: Cigars   • Smokeless tobacco: Never Used   • Tobacco comment: \" 5 - 7 cigarettes a day. \"    Vaping Use   • Vaping Use: Never used   Substance Use Topics   • Alcohol use: Not Currently   • Drug use: No       SURGICAL HISTORY  Past Surgical History:   Procedure Laterality Date   • GASTROSCOPY WITH BIOPSY  11/7/2010    Performed by MARY SAENZ at ENDOSCOPY [facility] ORS   • GASTROSCOPY WITH BIOPSY  9/3/2010    Performed by DIONNE KELLEY at ENDOSCOPY [facility] ORS   • OTHER ORTHOPEDIC SURGERY  2000    left ankle repair, total " "fusion.   • OTHER ABDOMINAL SURGERY  1982    hernia repair       ALLERGIES  Allergies   Allergen Reactions   • Hctz      Hyponatremia     • Nsaids    • Other Drug Nausea     All anti-inflammitories,  Headache and body aches.   • Vicodin [Hydrocodone-Acetaminophen] Vomiting     Headaches   • Vicodin [Hydrocodone-Acetaminophen]    • Amoxicillin Nausea     Headache and body aches       PHYSICAL EXAM  VITAL SIGNS: /74   Pulse 78   Temp 37.5 °C (99.5 °F) (Temporal)   Resp 16   Ht 1.854 m (6' 1\")   Wt 82.3 kg (181 lb 7 oz)   SpO2 97%   BMI 23.94 kg/m²    Constitutional: Awake and alert. Nontoxic  HENT: Dental caries without evidence of infection.  Mild pharyngeal erythema.  No exudate or asymmetry  Eyes: Grossly normal  Neck: Normal range of motion  Cardiovascular: Normal heart rate   Thorax & Lungs: No respiratory distress  Skin:  No pathologic rash.   Extremities: Well perfused  Psychiatric: Affect normal    COURSE & MEDICAL DECISION MAKING  Presents with strep pharyngitis and discomfort.  He is given a dose of dexamethasone in the emergency department.  I will prescribe Tylenol as needed for pain.  He should continue his antibiotics.  At this point I do not see any evidence of abscess or complication.  He will be discharged to follow-up with primary provider.    FINAL IMPRESSION  1.  Streptococcal pharyngitis      Disposition: home in good condition      This dictation was created using voice recognition software. The accuracy of the dictation is limited to the abilities of the software.  The nursing notes were reviewed and certain aspects of this information were incorporated into this note.      Electronically signed by: Joshua Boo M.D., 5/11/2022 11:43 AM      "

## 2022-05-11 NOTE — ED NOTES
Pt discharged to home. Pt provided education and discharge instructions per ERP. Pt ambulatory out of ED with steady gait. AOx4.   no

## 2022-05-11 NOTE — ED NOTES
Pt ambulated with a normal, steady gait to the room from the lobby. Agree with triage note. Pt states he has been taking antibiotics since testing positive for strep throat on 5/10.

## 2022-05-11 NOTE — ED TRIAGE NOTES
Pt ambulated to triage with   Chief Complaint   Patient presents with   • Sore Throat     Dx with strep throat, on abx but requesting pain medication.  Pt reports also some dental problems and lower back pain.    • Dental Pain   • Low Back Pain     On disability.       Pt Informed regarding triage process and verbalized understanding to inform triage tech or RN for any changes in condition. Placed in lobby.

## 2022-05-11 NOTE — PROGRESS NOTES
Subjective:   Haile Chavez is a 57 y.o. male who presents for Dental Pain      Dental Pain   This is a new problem. The current episode started in the past 7 days. The problem occurs constantly. The problem has been gradually worsening. The pain is at a severity of 10/10. The pain is severe. Associated symptoms include facial pain. Pertinent negatives include no difficulty swallowing, fever, oral bleeding, sinus pressure or thermal sensitivity. He has tried acetaminophen and NSAIDs for the symptoms. The treatment provided no relief.       Review of Systems   Constitutional: Negative for chills and fever.   HENT: Positive for sore throat. Negative for sinus pressure.         Dental infection   Eyes: Negative for pain.   Respiratory: Negative for shortness of breath.    Cardiovascular: Negative for chest pain.   Gastrointestinal: Negative for nausea and vomiting.   Genitourinary: Negative for hematuria.   Musculoskeletal: Negative for myalgias.   Skin: Negative for rash.   Neurological: Negative for dizziness.       Medications:    • acetaminophen Tabs  • baclofen Tabs  • benztropine Tabs  • clindamycin Caps  • divalproex ER Tb24  • Invega Sustenna Leonor  • metFORMIN Tabs  • olanzapine  • omeprazole  • pantoprazole Tbec  • phenazopyridine Tabs  • Stool Softener Caps  • trihexyphenidyl Tabs    Allergies: Hctz, Nsaids, Other drug, Vicodin [hydrocodone-acetaminophen], Vicodin [hydrocodone-acetaminophen], and Amoxicillin    Problem List: Haile Chavez does not have any pertinent problems on file.    Surgical History:  Past Surgical History:   Procedure Laterality Date   • GASTROSCOPY WITH BIOPSY  11/7/2010    Performed by MARY SAENZ at ENDOSCOPY [facility] ORS   • GASTROSCOPY WITH BIOPSY  9/3/2010    Performed by DIONNE KELLEY at ENDOSCOPY [facility] ORS   • OTHER ORTHOPEDIC SURGERY  2000    left ankle repair, total fusion.   • OTHER ABDOMINAL SURGERY  1982    hernia repair       Past Social  Hx: Haile Chavez  reports that he has been smoking cigars. He has a 15.00 pack-year smoking history. He has never used smokeless tobacco. He reports previous alcohol use. He reports that he does not use drugs.     Past Family Hx:  Haile Chavez family history is not on file.     Problem list, medications, and allergies reviewed by myself today in Epic.     Objective:     /70   Pulse 87   Temp 36.7 °C (98 °F)   Resp 16   SpO2 98%     Physical Exam  Vitals and nursing note reviewed.   Constitutional:       General: He is not in acute distress.     Appearance: Normal appearance. He is well-developed. He is not ill-appearing or toxic-appearing.   HENT:      Head: Normocephalic and atraumatic.      Right Ear: External ear normal.      Left Ear: External ear normal.      Nose: Nose normal.      Mouth/Throat:      Lips: Pink.      Mouth: Mucous membranes are moist.      Dentition: Abnormal dentition. Dental tenderness and dental caries present. No gingival swelling, dental abscesses or gum lesions.      Pharynx: Pharyngeal swelling and posterior oropharyngeal erythema present.     Eyes:      General: Lids are normal.         Right eye: No discharge.         Left eye: No discharge.      Conjunctiva/sclera: Conjunctivae normal.   Cardiovascular:      Rate and Rhythm: Normal rate.   Pulmonary:      Effort: Pulmonary effort is normal. No accessory muscle usage or respiratory distress.      Breath sounds: Normal breath sounds.   Abdominal:      General: There is no distension.   Musculoskeletal:         General: Normal range of motion.      Cervical back: Full passive range of motion without pain.   Lymphadenopathy:      Cervical: Cervical adenopathy present.   Skin:     General: Skin is warm and dry.      Coloration: Skin is not pale.   Neurological:      General: No focal deficit present.      Mental Status: He is alert and oriented to person, place, and time. Mental status is at baseline.      Gait: Gait  (gait at baseline) normal.   Psychiatric:         Mood and Affect: Mood normal.         Thought Content: Thought content normal.         Judgment: Judgment normal.         Assessment/Plan:     Diagnosis and associated orders:     1. Dental infection  clindamycin (CLEOCIN) 300 MG Cap   2. Pharyngitis, unspecified etiology          Comments/MDM:     Advise follow-up with dentist  Use over-the-counter pain reliever, such as acetaminophen (Tylenol), ibuprofen (Advil, Motrin) or naproxen (Aleve) as needed; follow package directions for dosing.  Differential diagnosis, natural history, supportive care, and indications for immediate follow-up discussed.              Please note that this dictation was created using voice recognition software. I have made a reasonable attempt to correct obvious errors, but I expect that there are errors of grammar and possibly content that I did not discover before finalizing the note.    This note was electronically signed by Sergio RENE.

## 2022-05-13 NOTE — ED NOTES
Message left for daughter in law Ebb Ora, as verified in demographics to return call back. Patient resting quietly in bed without distress, denies any complaints or needs at this time.  Call bell within reach.

## 2022-08-15 ENCOUNTER — HOSPITAL ENCOUNTER (EMERGENCY)
Facility: MEDICAL CENTER | Age: 57
End: 2022-08-15
Attending: EMERGENCY MEDICINE
Payer: MEDICARE

## 2022-08-15 VITALS
HEIGHT: 73 IN | OXYGEN SATURATION: 97 % | WEIGHT: 172.84 LBS | RESPIRATION RATE: 16 BRPM | SYSTOLIC BLOOD PRESSURE: 144 MMHG | HEART RATE: 70 BPM | BODY MASS INDEX: 22.91 KG/M2 | TEMPERATURE: 97.5 F | DIASTOLIC BLOOD PRESSURE: 70 MMHG

## 2022-08-15 DIAGNOSIS — M79.672 LEFT FOOT PAIN: ICD-10-CM

## 2022-08-15 LAB — GLUCOSE BLD STRIP.AUTO-MCNC: 87 MG/DL (ref 65–99)

## 2022-08-15 PROCEDURE — 99283 EMERGENCY DEPT VISIT LOW MDM: CPT

## 2022-08-15 PROCEDURE — 82962 GLUCOSE BLOOD TEST: CPT

## 2022-08-15 PROCEDURE — 90715 TDAP VACCINE 7 YRS/> IM: CPT | Performed by: EMERGENCY MEDICINE

## 2022-08-15 PROCEDURE — 90471 IMMUNIZATION ADMIN: CPT

## 2022-08-15 PROCEDURE — 700111 HCHG RX REV CODE 636 W/ 250 OVERRIDE (IP): Performed by: EMERGENCY MEDICINE

## 2022-08-15 RX ORDER — CLOTRIMAZOLE 1 %
1 CREAM (GRAM) TOPICAL 2 TIMES DAILY
Qty: 28 G | Refills: 0 | Status: SHIPPED | OUTPATIENT
Start: 2022-08-15 | End: 2023-07-16

## 2022-08-15 RX ORDER — GINSENG 100 MG
1 CAPSULE ORAL 2 TIMES DAILY
Qty: 28 G | Refills: 0 | Status: SHIPPED | OUTPATIENT
Start: 2022-08-15 | End: 2023-07-16

## 2022-08-15 RX ADMIN — CLOSTRIDIUM TETANI TOXOID ANTIGEN (FORMALDEHYDE INACTIVATED), CORYNEBACTERIUM DIPHTHERIAE TOXOID ANTIGEN (FORMALDEHYDE INACTIVATED), BORDETELLA PERTUSSIS TOXOID ANTIGEN (GLUTARALDEHYDE INACTIVATED), BORDETELLA PERTUSSIS FILAMENTOUS HEMAGGLUTININ ANTIGEN (FORMALDEHYDE INACTIVATED), BORDETELLA PERTUSSIS PERTACTIN ANTIGEN, AND BORDETELLA PERTUSSIS FIMBRIAE 2/3 ANTIGEN 0.5 ML: 5; 2; 2.5; 5; 3; 5 INJECTION, SUSPENSION INTRAMUSCULAR at 07:31

## 2022-08-15 ASSESSMENT — ENCOUNTER SYMPTOMS: FEVER: 0

## 2022-08-15 ASSESSMENT — FIBROSIS 4 INDEX: FIB4 SCORE: 1.24

## 2022-08-15 NOTE — ED NOTES
"Pt appears to be getting restless. Pt states he is supposed to be getting \"ointment and leaving.\"    Apologies given to Pt for the wait. Pt appears to be relived and laid back on bed. Chart placed up for Re-eval for ERP.   "

## 2022-08-15 NOTE — ED NOTES
Pt was supplied with a few packets of antibiotic ointment per ERP. Pt refused to have it applied stating he will when he gets home.     Pt sitting upright in bed in no obvious distress at this time. Discharge information and instructions given/discussed with Pt. Pt acknowledged information with no questions. Pt self ambulated to [facility] without difficulty for his ride.

## 2022-08-15 NOTE — DISCHARGE INSTRUCTIONS
Apply antibiotic ointment and antifungal ointment twice a day.  Follow-up primary care.  Return for more pain or other concerns.

## 2022-08-15 NOTE — ED PROVIDER NOTES
"ED Provider Note    Scribed for Anderson Neville M.D. by Joy Francois. 8/15/2022, 6:58 AM.    Primary care provider: Pcp Not In Computer  Means of arrival: Walk-In  History obtained from: Patient  History limited by: None    CHIEF COMPLAINT  Chief Complaint   Patient presents with    Foot Problem     Wound to bottom of left foot       HPI  Haile Chavez is a 57 y.o. male who presents to the Emergency Department for evaluation of acute left foot pain secondary to a wound onset a few weeks ago. Denies any trauma to the foot. Pain is exacerbated with pressure. Patient has associated skin breakdown. Denies any fevers or discharge. Patient states that he used to take Metformin, but currently does not. Tetanus is not up to date. Drug allergies to Hctz, NSAIDS, Vicodin, and Amoxicillin.     REVIEW OF SYSTEMS  Review of Systems   Constitutional:  Negative for fever.   Musculoskeletal:         Positive for left foot pain.    Skin:         Positive for skin breakdown of the left foot.      PAST MEDICAL HISTORY   has a past medical history of Angina, Diabetes (HCC), Hepatitis C (5/24/1996), HTN (hypertension), benign (4/22/2008), Psychiatric disorder, PTSD (post-traumatic stress disorder), Schizo affective schizophrenia (HCC) (4/22/2010), Schizophrenia, schizo-affective type (HCC), and Tuberculosis (4/22/1992).    SURGICAL HISTORY   has a past surgical history that includes gastroscopy with biopsy (9/3/2010); other abdominal surgery (1982); gastroscopy with biopsy (11/7/2010); and other orthopedic surgery (2000).    SOCIAL HISTORY  Social History     Tobacco Use    Smoking status: Every Day     Packs/day: 0.50     Years: 30.00     Pack years: 15.00     Types: Cigars, Cigarettes    Smokeless tobacco: Never    Tobacco comments:     \" 5 - 7 cigarettes a day. \"    Vaping Use    Vaping Use: Never used   Substance Use Topics    Alcohol use: Not Currently    Drug use: No      Social History     Substance and Sexual Activity " "  Drug Use No       FAMILY HISTORY  Family History   Problem Relation Age of Onset    Genetic Disorder Neg Hx        CURRENT MEDICATIONS  Home Medications       Reviewed by Khanh Reyes R.N. (Registered Nurse) on 08/15/22 at 0601  Med List Status: <None>     Medication Last Dose Status   acetaminophen (TYLENOL) 325 MG Tab  Active   baclofen (LIORESAL) 5 MG Tab  Active   benztropine (COGENTIN) 1 MG Tab  Active   benztropine (COGENTIN) 2 MG Tab  Active   divalproex ER (DEPAKOTE ER) 500 MG TABLET SR 24 HR  Active   INVEGA SUSTENNA 234 MG/1.5ML Suspension Prefilled Syringe  Active   metFORMIN (GLUCOPHAGE) 500 MG Tab  Active   olanzapine (ZYPREXA) 20 MG tablet  Active   omeprazole (PRILOSEC) 20 MG delayed-release capsule  Active   omeprazole (PRILOSEC) 20 MG delayed-release capsule  Active   omeprazole (PRILOSEC) 40 MG delayed-release capsule  Active   pantoprazole (PROTONIX) 40 MG Tablet Delayed Response  Active   phenazopyridine (PYRIDIUM) 200 MG Tab  Active   STOOL SOFTENER 100 MG Cap  Active   trihexyphenidyl (ARTANE) 2 MG Tab  Active                    ALLERGIES  Allergies   Allergen Reactions    Hctz      Hyponatremia      Nsaids     Other Drug Nausea     All anti-inflammitories,  Headache and body aches.    Vicodin [Hydrocodone-Acetaminophen] Vomiting     Headaches    Vicodin [Hydrocodone-Acetaminophen]     Amoxicillin Nausea     Headache and body aches       PHYSICAL EXAM  VITAL SIGNS: /79   Pulse 71   Temp 36.5 °C (97.7 °F)   Resp 20   Ht 1.854 m (6' 1\")   Wt 78.4 kg (172 lb 13.5 oz)   SpO2 98%   BMI 22.80 kg/m²   Vitals reviewed.  Constitutional: Well developed, Well nourished, No acute distress, Non-toxic appearance.   HENT: Normocephalic, Atraumatic,   Abdomen: Bowel sounds normal, Soft, No tenderness  Skin: Warm, Dry. Left foot: Some skin breakdown and dry skin in the webspace and below the toes at the base the toes and left foot.  There is no erythema.  This been cracking.  Back: No " tenderness, No CVA tenderness.   Musculoskeletal: Good range of motion in all major joints.  Neurologic: Alert, No focal deficits noted.   Psychiatric: Affect normal    LABS  Results for orders placed or performed during the hospital encounter of 08/15/22   POCT glucose device results   Result Value Ref Range    POC Glucose, Blood 87 65 - 99 mg/dL     All labs reviewed by me.    COURSE & MEDICAL DECISION MAKING  Pertinent Labs & Imaging studies reviewed. (See chart for details)    6:58 AM Patient seen and examined at bedside. The patient presents with a left foot pain, and the differential diagnosis includes but is not limited to Athlete's Foot vs bacterial infection, or dry skin.  After my exam, I explained to the patient the plan of care, which includes updating his Tetanus as well as checking his blood sugar. Patient understands and verbalizes agreement to plan of care. Ordered for POCT glucose to evaluate. Patient will be treated with Adacel 0.5 mL for his symptoms.      8:20 AM - Patient was reevaluated at bedside. I informed the patient of my plan for discharge, which includes sending him home with a prescription for antibiotics and antifungal cream, as well as giving strict return precautions for any new or worsening symptoms. I also advised him to follow up with a Primacy Care Provider. Patient understands and verbalizes agreement to plan of care. Patient is comfortable going home at this time.  She is better foot hygiene follow-up with medicine tinea causing dryness and cracking of the skin with may be some scabbing put on antibiotic and antifungal cream and follow-up primary care.    The patient will return for new or worsening symptoms and is stable at the time of discharge.    The patient is referred to a primary physician for blood pressure management, diabetic screening, and for all other preventative health concerns.    DISPOSITION:  Patient will be discharged home in stable condition.    FOLLOW  UP:  [facility]  [address]  [place] [address]  Schedule an appointment as soon as possible for a visit in 2 days      OUTPATIENT MEDICATIONS:  New Prescriptions    BACITRACIN 500 UNIT/GM OINTMENT    Apply 1 Each topically 2 times a day.    CLOTRIMAZOLE (LOTRIMIN) 1 % CREAM    Apply 1 Application topically 2 times a day.       FINAL IMPRESSION  1. Right foot pain          Joy PAGE (Scribe), am scribing for, and in the presence of, Anderson Neville M.D..    Electronically signed by: Joy Francois (Scribe), 8/15/2022    Anderson PAGE M.D. personally performed the services described in this documentation, as scribed by Joy Francois in my presence, and it is both accurate and complete.    The note accurately reflects work and decisions made by me.  Anderson Neville M.D.  8/15/2022  1:40 PM

## 2022-08-15 NOTE — ED TRIAGE NOTES
"Chief Complaint   Patient presents with    Foot Problem     Wound to bottom of left foot     Patient ambulatory to triage for left foot wound check on great toe. Patient states that \"its like something is in there.\"    Appears to be a callous noted to great toe.    Pt is alert and oriented, speaking in full sentences, follows commands and responds appropriately to questions. Resp are even and unlabored.      Pt placed in lobby. Pt educated on triage process. Pt encouraged to alert staff for any changes.     Patient and staff wearing appropriate PPE    /79   Pulse 71   Temp 36.5 °C (97.7 °F)   Resp 20   Ht 1.854 m (6' 1\")   Wt 78.4 kg (172 lb 13.5 oz)   SpO2 98%    "

## 2022-11-04 ENCOUNTER — PATIENT MESSAGE (OUTPATIENT)
Dept: HEALTH INFORMATION MANAGEMENT | Facility: OTHER | Age: 57
End: 2022-11-04

## 2023-04-10 PROCEDURE — 93005 ELECTROCARDIOGRAM TRACING: CPT | Performed by: EMERGENCY MEDICINE

## 2023-04-10 PROCEDURE — 93005 ELECTROCARDIOGRAM TRACING: CPT

## 2023-04-10 PROCEDURE — 99284 EMERGENCY DEPT VISIT MOD MDM: CPT

## 2023-04-11 ENCOUNTER — HOSPITAL ENCOUNTER (EMERGENCY)
Facility: MEDICAL CENTER | Age: 58
End: 2023-04-11
Attending: EMERGENCY MEDICINE
Payer: MEDICARE

## 2023-04-11 VITALS
RESPIRATION RATE: 17 BRPM | DIASTOLIC BLOOD PRESSURE: 103 MMHG | HEART RATE: 66 BPM | TEMPERATURE: 98.6 F | SYSTOLIC BLOOD PRESSURE: 195 MMHG | HEIGHT: 72 IN | BODY MASS INDEX: 24.34 KG/M2 | OXYGEN SATURATION: 97 % | WEIGHT: 179.68 LBS

## 2023-04-11 DIAGNOSIS — R10.13 EPIGASTRIC PAIN: ICD-10-CM

## 2023-04-11 DIAGNOSIS — G89.29 CHRONIC DENTAL PAIN: ICD-10-CM

## 2023-04-11 DIAGNOSIS — G44.209 ACUTE NON INTRACTABLE TENSION-TYPE HEADACHE: ICD-10-CM

## 2023-04-11 DIAGNOSIS — K08.9 CHRONIC DENTAL PAIN: ICD-10-CM

## 2023-04-11 LAB
ALBUMIN SERPL BCP-MCNC: 4.3 G/DL (ref 3.2–4.9)
ALBUMIN/GLOB SERPL: 1.3 G/DL
ALP SERPL-CCNC: 83 U/L (ref 30–99)
ALT SERPL-CCNC: 10 U/L (ref 2–50)
ANION GAP SERPL CALC-SCNC: 11 MMOL/L (ref 7–16)
AST SERPL-CCNC: 14 U/L (ref 12–45)
BASOPHILS # BLD AUTO: 0.3 % (ref 0–1.8)
BASOPHILS # BLD: 0.03 K/UL (ref 0–0.12)
BILIRUB SERPL-MCNC: 0.4 MG/DL (ref 0.1–1.5)
BUN SERPL-MCNC: 10 MG/DL (ref 8–22)
CALCIUM ALBUM COR SERPL-MCNC: 8.8 MG/DL (ref 8.5–10.5)
CALCIUM SERPL-MCNC: 9 MG/DL (ref 8.5–10.5)
CHLORIDE SERPL-SCNC: 100 MMOL/L (ref 96–112)
CO2 SERPL-SCNC: 26 MMOL/L (ref 20–33)
CREAT SERPL-MCNC: 0.95 MG/DL (ref 0.5–1.4)
EKG IMPRESSION: NORMAL
EOSINOPHIL # BLD AUTO: 0.68 K/UL (ref 0–0.51)
EOSINOPHIL NFR BLD: 7.5 % (ref 0–6.9)
ERYTHROCYTE [DISTWIDTH] IN BLOOD BY AUTOMATED COUNT: 46.5 FL (ref 35.9–50)
GFR SERPLBLD CREATININE-BSD FMLA CKD-EPI: 92 ML/MIN/1.73 M 2
GLOBULIN SER CALC-MCNC: 3.4 G/DL (ref 1.9–3.5)
GLUCOSE SERPL-MCNC: 93 MG/DL (ref 65–99)
HCT VFR BLD AUTO: 41.4 % (ref 42–52)
HGB BLD-MCNC: 14 G/DL (ref 14–18)
IMM GRANULOCYTES # BLD AUTO: 0.04 K/UL (ref 0–0.11)
IMM GRANULOCYTES NFR BLD AUTO: 0.4 % (ref 0–0.9)
LIPASE SERPL-CCNC: 41 U/L (ref 11–82)
LYMPHOCYTES # BLD AUTO: 1.59 K/UL (ref 1–4.8)
LYMPHOCYTES NFR BLD: 17.5 % (ref 22–41)
MCH RBC QN AUTO: 30.4 PG (ref 27–33)
MCHC RBC AUTO-ENTMCNC: 33.8 G/DL (ref 33.7–35.3)
MCV RBC AUTO: 90 FL (ref 81.4–97.8)
MONOCYTES # BLD AUTO: 0.78 K/UL (ref 0–0.85)
MONOCYTES NFR BLD AUTO: 8.6 % (ref 0–13.4)
NEUTROPHILS # BLD AUTO: 5.96 K/UL (ref 1.82–7.42)
NEUTROPHILS NFR BLD: 65.7 % (ref 44–72)
NRBC # BLD AUTO: 0 K/UL
NRBC BLD-RTO: 0 /100 WBC
PLATELET # BLD AUTO: 170 K/UL (ref 164–446)
PMV BLD AUTO: 9.9 FL (ref 9–12.9)
POTASSIUM SERPL-SCNC: 4 MMOL/L (ref 3.6–5.5)
PROT SERPL-MCNC: 7.7 G/DL (ref 6–8.2)
RBC # BLD AUTO: 4.6 M/UL (ref 4.7–6.1)
SODIUM SERPL-SCNC: 137 MMOL/L (ref 135–145)
TROPONIN T SERPL-MCNC: 9 NG/L (ref 6–19)
WBC # BLD AUTO: 9.1 K/UL (ref 4.8–10.8)

## 2023-04-11 PROCEDURE — 96374 THER/PROPH/DIAG INJ IV PUSH: CPT

## 2023-04-11 PROCEDURE — 84484 ASSAY OF TROPONIN QUANT: CPT

## 2023-04-11 PROCEDURE — 80053 COMPREHEN METABOLIC PANEL: CPT

## 2023-04-11 PROCEDURE — 700111 HCHG RX REV CODE 636 W/ 250 OVERRIDE (IP): Performed by: EMERGENCY MEDICINE

## 2023-04-11 PROCEDURE — 83690 ASSAY OF LIPASE: CPT

## 2023-04-11 PROCEDURE — 85025 COMPLETE CBC W/AUTO DIFF WBC: CPT

## 2023-04-11 PROCEDURE — 36415 COLL VENOUS BLD VENIPUNCTURE: CPT

## 2023-04-11 PROCEDURE — 96375 TX/PRO/DX INJ NEW DRUG ADDON: CPT

## 2023-04-11 RX ORDER — OMEPRAZOLE 40 MG/1
40 CAPSULE, DELAYED RELEASE ORAL DAILY
Qty: 30 CAPSULE | Refills: 0 | Status: SHIPPED | OUTPATIENT
Start: 2023-04-11 | End: 2023-07-16

## 2023-04-11 RX ORDER — KETOROLAC TROMETHAMINE 30 MG/ML
15 INJECTION, SOLUTION INTRAMUSCULAR; INTRAVENOUS ONCE
Status: COMPLETED | OUTPATIENT
Start: 2023-04-11 | End: 2023-04-11

## 2023-04-11 RX ORDER — DIPHENHYDRAMINE HYDROCHLORIDE 50 MG/ML
25 INJECTION INTRAMUSCULAR; INTRAVENOUS ONCE
Status: COMPLETED | OUTPATIENT
Start: 2023-04-11 | End: 2023-04-11

## 2023-04-11 RX ORDER — PROCHLORPERAZINE EDISYLATE 5 MG/ML
5 INJECTION INTRAMUSCULAR; INTRAVENOUS ONCE
Status: COMPLETED | OUTPATIENT
Start: 2023-04-11 | End: 2023-04-11

## 2023-04-11 RX ADMIN — DIPHENHYDRAMINE HYDROCHLORIDE 25 MG: 50 INJECTION, SOLUTION INTRAMUSCULAR; INTRAVENOUS at 01:30

## 2023-04-11 RX ADMIN — KETOROLAC TROMETHAMINE 15 MG: 30 INJECTION, SOLUTION INTRAMUSCULAR at 01:30

## 2023-04-11 RX ADMIN — PROCHLORPERAZINE EDISYLATE 5 MG: 5 INJECTION INTRAMUSCULAR; INTRAVENOUS at 01:30

## 2023-04-11 NOTE — ED TRIAGE NOTES
Chief Complaint   Patient presents with    Chest Pain     BIBA for Central chest pain that began Monday, radiates to jaw   Pt states he was treated for chest pain in 1997 with omeprazole   Difficult to follow story  Hx schizophrenia      BP (!) 179/99   Pulse 79   Temp 37.1 °C (98.8 °F) (Temporal)   Resp 18   Ht 1.829 m (6')   Wt 81.5 kg (179 lb 10.8 oz)   SpO2 98%   BMI 24.37 kg/m²

## 2023-04-11 NOTE — ED PROVIDER NOTES
ED Provider Note    CHIEF COMPLAINT  Chief Complaint   Patient presents with    Chest Pain     BIBA for Central chest pain that began Monday, radiates to jaw   Pt states he was treated for chest pain in 1997 with omeprazole   Difficult to follow story  Hx schizophrenia        EXTERNAL RECORDS REVIEWED  Other none    HPI/ROS  LIMITATION TO HISTORY   Select: : None  OUTSIDE HISTORIAN(S):  None    Haile Chavez is a 58 y.o. male who presents to the emerged part with chief complaint of epigastric pain that radiates to his chest he states he had this on and off for a long time and the pains been pretty unbearable as of late.  He states he is to take omeprazole for this with lately has not been taking anything.  He is also complaining of a headache which feels like a migraine.  He states he is taking Tylenol and ibuprofen over-the-counter but does make it feel any better.  He had headaches like this as well in the past just generalized aching discomfort without vision changes he has a little bit of nausea but no vomiting.  No diarrhea denies any fevers.  Chest pain described as a burning sensation that starts in his epigastrium and radiates up into his chest    PAST MEDICAL HISTORY   has a past medical history of Angina, Diabetes (HCC), Hepatitis C (5/24/1996), HTN (hypertension), benign (4/22/2008), Psychiatric disorder, PTSD (post-traumatic stress disorder), Schizo affective schizophrenia (HCC) (4/22/2010), Schizophrenia, schizo-affective type (HCC), and Tuberculosis (4/22/1992).    SURGICAL HISTORY   has a past surgical history that includes gastroscopy with biopsy (9/3/2010); other abdominal surgery (1982); gastroscopy with biopsy (11/7/2010); and other orthopedic surgery (2000).    FAMILY HISTORY  Family History   Problem Relation Age of Onset    Genetic Disorder Neg Hx        SOCIAL HISTORY  Social History     Tobacco Use    Smoking status: Every Day     Packs/day: 0.50     Years: 30.00     Pack years: 15.00     " Types: Cigars, Cigarettes    Smokeless tobacco: Never    Tobacco comments:     \" 5 - 7 cigarettes a day. \"    Vaping Use    Vaping Use: Never used   Substance and Sexual Activity    Alcohol use: Not Currently    Drug use: No    Sexual activity: Yes     Partners: Female       CURRENT MEDICATIONS  Home Medications       Reviewed by Jitendra De La Torre R.N. (Registered Nurse) on 04/10/23 at 2223  Med List Status: Not Addressed     Medication Last Dose Status   acetaminophen (TYLENOL) 325 MG Tab  Active   bacitracin 500 UNIT/GM ointment  Active   baclofen (LIORESAL) 5 MG Tab  Active   benztropine (COGENTIN) 1 MG Tab  Active   benztropine (COGENTIN) 2 MG Tab  Active   clotrimazole (LOTRIMIN) 1 % Cream  Active   divalproex ER (DEPAKOTE ER) 500 MG TABLET SR 24 HR  Active   INVEGA SUSTENNA 234 MG/1.5ML Suspension Prefilled Syringe  Active   metFORMIN (GLUCOPHAGE) 500 MG Tab  Active   olanzapine (ZYPREXA) 20 MG tablet  Active   omeprazole (PRILOSEC) 20 MG delayed-release capsule  Active   omeprazole (PRILOSEC) 20 MG delayed-release capsule  Active   omeprazole (PRILOSEC) 40 MG delayed-release capsule  Active   pantoprazole (PROTONIX) 40 MG Tablet Delayed Response  Active   phenazopyridine (PYRIDIUM) 200 MG Tab  Active   STOOL SOFTENER 100 MG Cap  Active   trihexyphenidyl (ARTANE) 2 MG Tab  Active                    ALLERGIES  Allergies   Allergen Reactions    Hctz      Hyponatremia      Nsaids     Other Drug Nausea     All anti-inflammitories,  Headache and body aches.    Vicodin [Hydrocodone-Acetaminophen] Vomiting     Headaches    Vicodin [Hydrocodone-Acetaminophen]     Amoxicillin Nausea     Headache and body aches       PHYSICAL EXAM  VITAL SIGNS: BP (!) 179/99   Pulse 79   Temp 37.1 °C (98.8 °F) (Temporal)   Resp 18   Ht 1.829 m (6')   Wt 81.5 kg (179 lb 10.8 oz)   SpO2 98%   BMI 24.37 kg/m²    Pulse Ox Interpretation:   Pulse Ox is normal   Constitutional: Alert in no apparent distress.  HENT: Normocephalic " atraumatic, MMM  Eyes: PER, Conjunctiva normal, Non-icteric.   Cardiovascular: Regular rate and rhythm, no murmurs.   Thorax & Lungs: Normal breath sounds, No respiratory distress, No wheezing, No chest tenderness.   Abdomen: Bowel sounds normal, Soft, No tenderness, No pulsatile masses. No peritoneal signs.  Skin: Warm, Dry, No erythema, No rash.   Extremities/MSK: Intact equal distal pulses, no edema  Neurologic: Alert and oriented x3, No focal deficits noted.       DIAGNOSTIC STUDIES / PROCEDURES  EKG  I have independently interpreted this EKG  Results for orders placed or performed during the hospital encounter of 23   EKG   Result Value Ref Range    Report       [facility] Emergency Dept.    Test Date:  2023-04-10  Pt Name:    JAYMIE SINGH                Department: ER  MRN:        [id number]                      Room:  Gender:     Male                         Technician: [id number]  :        1965                   Requested By:ER TRIAGE PROTOCOL  Order #:    [id number]                    Reading MD: Liliana Zuñiga MD    Measurements  Intervals                                Axis  Rate:       71                           P:          9  WA:         196                          QRS:        65  QRSD:       93                           T:          42  QT:         379  QTc:        412    Interpretive Statements  Sinus rhythm at a rate of 71 no ST elevations or ST depressions Q waves in  the  anterior leads V1 V2 unchanged from prior normal intervals normal axis  Compared to ECG 2022 16:30:27  Sinus bradycardia no longer present  Myocardial infarct finding still present  Electronically Signed On 2023 1:00:26 PDT by RANDAL Zuñiga MD           LABS  Labs Reviewed   CBC WITH DIFFERENTIAL - Abnormal; Notable for the following components:       Result Value    RBC 4.60 (*)     Hematocrit 41.4 (*)     Lymphocytes 17.50 (*)     Eosinophils 7.50 (*)     Eos (Absolute) 0.68 (*)      All other components within normal limits    Narrative:     Biotin intake of greater than 5 mg per day may interfere with  troponin levels, causing false low values.   COMP METABOLIC PANEL    Narrative:     Biotin intake of greater than 5 mg per day may interfere with  troponin levels, causing false low values.   TROPONIN    Narrative:     Biotin intake of greater than 5 mg per day may interfere with  troponin levels, causing false low values.   LIPASE    Narrative:     Biotin intake of greater than 5 mg per day may interfere with  troponin levels, causing false low values.   CORRECTED CALCIUM    Narrative:     Biotin intake of greater than 5 mg per day may interfere with  troponin levels, causing false low values.   ESTIMATED GFR    Narrative:     Biotin intake of greater than 5 mg per day may interfere with  troponin levels, causing false low values.               COURSE & MEDICAL DECISION MAKING    ED Observation Status? Yes; I am placing the patient in to an observation status due to a diagnostic uncertainty as well as therapeutic intensity. Patient placed in observation status at 1:09 AM, 4/11/2023.     Observation plan is as follows: Laboratory analysis to evaluate for ACS pain management for migraine    Upon Reevaluation, the patient's condition has: Improved; and will be discharged.    Patient discharged from ED Observation status at 2:05 AM (Time)/12 (Date).     INITIAL ASSESSMENT, COURSE AND PLAN  Care Narrative: Patient presents with a lot of complaints at this time.  He is homeless and has a history of schizophrenia but is actually making pretty good sense to me.  Chest pain is more likely epigastric pain or gastritis GERD or reflux.  He is nontender at this time will evaluate for signs of ACS although his EKG is unremarkable troponin be ordered as well as treated for this headache like migraine symptoms Toradol Benadryl Compazine.  And then repeat assessment.    DISPOSITION AND DISCUSSIONS  1:45 AM the  patient is feeling slightly improved.  He is resting comfortably there is no signs of ACS his troponin is within normal limits normal lipase no signs of pancreatitis low concern for peptic ulcer disease or rupture.  He will be sent home with oral medications for reflux and strict return precautions for any new or worsening issues.    I have discussed management of the patient with the following physicians and DONNY's:  none    Discussion of management with other \[facility] [place] [facility]\"" or appropriate source(s): None     Escalation of care considered, and ultimately not performed:acute inpatient care management, however at this time, the patient is most appropriate for outpatient management    Barriers to care at this time, including but not limited to: Patient is homeless.     Decision tools and prescription drugs considered including, but not limited to: HEART Score 2 .    The patient will return for new or worsening symptoms and is stable at the time of discharge.    The patient is referred to a primary physician for blood pressure management, diabetic screening, and for all other preventative health concerns.    DISPOSITION:  Patient will be discharged home in stable condition.    FOLLOW UP:  [facility], Emergency Dept  [facility] [facility] [address] [phone]    If symptoms worsen      OUTPATIENT MEDICATIONS:  Discharge Medication List as of 4/11/2023  2:28 AM            FINAL DIAGNOSIS  1. Epigastric pain    2. Chronic dental pain    3. Acute non intractable tension-type headache           Electronically signed by: Liliana Zuñiga M.D., 4/11/2023 1:09 AM

## 2023-04-11 NOTE — ED NOTES
Pt called in lobby with no answer. Staff report that the pt went out for a smoke break. Name called in  with no response

## 2023-04-11 NOTE — ED NOTES
Patient ambulated from lobby to room independently with steady gait.    EKG complete in triage.  Chart up for ERP.

## 2023-04-11 NOTE — ED NOTES
PIV removed, catheter intact. Discharge education provided. Discharge paperwork signed by pt. Prescription to be picked up by pt. All questions answered. All belongings with pt. Pt ambulated to lobby unassisted with steady gait.

## 2023-05-13 ENCOUNTER — APPOINTMENT (OUTPATIENT)
Dept: RADIOLOGY | Facility: MEDICAL CENTER | Age: 58
End: 2023-05-13
Attending: EMERGENCY MEDICINE
Payer: MEDICARE

## 2023-05-13 ENCOUNTER — HOSPITAL ENCOUNTER (EMERGENCY)
Facility: MEDICAL CENTER | Age: 58
End: 2023-05-13
Attending: EMERGENCY MEDICINE
Payer: MEDICARE

## 2023-05-13 VITALS
DIASTOLIC BLOOD PRESSURE: 96 MMHG | RESPIRATION RATE: 18 BRPM | HEART RATE: 50 BPM | WEIGHT: 186 LBS | SYSTOLIC BLOOD PRESSURE: 163 MMHG | BODY MASS INDEX: 23.87 KG/M2 | HEIGHT: 74 IN | OXYGEN SATURATION: 95 % | TEMPERATURE: 98.3 F

## 2023-05-13 DIAGNOSIS — R07.9 CHEST PAIN, UNSPECIFIED TYPE: ICD-10-CM

## 2023-05-13 LAB
ALBUMIN SERPL BCP-MCNC: 4.1 G/DL (ref 3.2–4.9)
ALBUMIN/GLOB SERPL: 1.5 G/DL
ALP SERPL-CCNC: 68 U/L (ref 30–99)
ALT SERPL-CCNC: 10 U/L (ref 2–50)
ANION GAP SERPL CALC-SCNC: 12 MMOL/L (ref 7–16)
AST SERPL-CCNC: 15 U/L (ref 12–45)
BASOPHILS # BLD AUTO: 0.8 % (ref 0–1.8)
BASOPHILS # BLD: 0.03 K/UL (ref 0–0.12)
BILIRUB SERPL-MCNC: 0.5 MG/DL (ref 0.1–1.5)
BUN SERPL-MCNC: 5 MG/DL (ref 8–22)
CALCIUM ALBUM COR SERPL-MCNC: 8.7 MG/DL (ref 8.5–10.5)
CALCIUM SERPL-MCNC: 8.8 MG/DL (ref 8.5–10.5)
CHLORIDE SERPL-SCNC: 102 MMOL/L (ref 96–112)
CO2 SERPL-SCNC: 22 MMOL/L (ref 20–33)
CREAT SERPL-MCNC: 0.81 MG/DL (ref 0.5–1.4)
EKG IMPRESSION: NORMAL
EOSINOPHIL # BLD AUTO: 0.51 K/UL (ref 0–0.51)
EOSINOPHIL NFR BLD: 14.3 % (ref 0–6.9)
ERYTHROCYTE [DISTWIDTH] IN BLOOD BY AUTOMATED COUNT: 44.6 FL (ref 35.9–50)
GFR SERPLBLD CREATININE-BSD FMLA CKD-EPI: 101 ML/MIN/1.73 M 2
GLOBULIN SER CALC-MCNC: 2.8 G/DL (ref 1.9–3.5)
GLUCOSE SERPL-MCNC: 111 MG/DL (ref 65–99)
HCT VFR BLD AUTO: 40.5 % (ref 42–52)
HGB BLD-MCNC: 13.6 G/DL (ref 14–18)
IMM GRANULOCYTES # BLD AUTO: 0.01 K/UL (ref 0–0.11)
IMM GRANULOCYTES NFR BLD AUTO: 0.3 % (ref 0–0.9)
LYMPHOCYTES # BLD AUTO: 1.28 K/UL (ref 1–4.8)
LYMPHOCYTES NFR BLD: 35.9 % (ref 22–41)
MCH RBC QN AUTO: 29.9 PG (ref 27–33)
MCHC RBC AUTO-ENTMCNC: 33.6 G/DL (ref 33.7–35.3)
MCV RBC AUTO: 89 FL (ref 81.4–97.8)
MONOCYTES # BLD AUTO: 0.29 K/UL (ref 0–0.85)
MONOCYTES NFR BLD AUTO: 8.1 % (ref 0–13.4)
NEUTROPHILS # BLD AUTO: 1.45 K/UL (ref 1.82–7.42)
NEUTROPHILS NFR BLD: 40.6 % (ref 44–72)
NRBC # BLD AUTO: 0 K/UL
NRBC BLD-RTO: 0 /100 WBC
PLATELET # BLD AUTO: 159 K/UL (ref 164–446)
PMV BLD AUTO: 9.9 FL (ref 9–12.9)
POTASSIUM SERPL-SCNC: 4.1 MMOL/L (ref 3.6–5.5)
PROT SERPL-MCNC: 6.9 G/DL (ref 6–8.2)
RBC # BLD AUTO: 4.55 M/UL (ref 4.7–6.1)
SODIUM SERPL-SCNC: 136 MMOL/L (ref 135–145)
TROPONIN T SERPL-MCNC: 9 NG/L (ref 6–19)
TROPONIN T SERPL-MCNC: <6 NG/L (ref 6–19)
WBC # BLD AUTO: 3.6 K/UL (ref 4.8–10.8)

## 2023-05-13 PROCEDURE — 93005 ELECTROCARDIOGRAM TRACING: CPT

## 2023-05-13 PROCEDURE — 71045 X-RAY EXAM CHEST 1 VIEW: CPT

## 2023-05-13 PROCEDURE — 80053 COMPREHEN METABOLIC PANEL: CPT

## 2023-05-13 PROCEDURE — 93005 ELECTROCARDIOGRAM TRACING: CPT | Performed by: EMERGENCY MEDICINE

## 2023-05-13 PROCEDURE — 85025 COMPLETE CBC W/AUTO DIFF WBC: CPT

## 2023-05-13 PROCEDURE — 36415 COLL VENOUS BLD VENIPUNCTURE: CPT

## 2023-05-13 PROCEDURE — 99285 EMERGENCY DEPT VISIT HI MDM: CPT

## 2023-05-13 PROCEDURE — 84484 ASSAY OF TROPONIN QUANT: CPT | Mod: 91

## 2023-05-13 ASSESSMENT — PAIN DESCRIPTION - PAIN TYPE: TYPE: ACUTE PAIN

## 2023-05-13 ASSESSMENT — FIBROSIS 4 INDEX: FIB4 SCORE: 1.56

## 2023-05-13 NOTE — ED PROVIDER NOTES
ED Provider Note    CHIEF COMPLAINT  Chief Complaint   Patient presents with    Chest Pain     Since today, Pt was walking to store and experienced SOB and chest pain. Pt has history of MI with stent placement 20 years ago.    Shortness of Breath     SOB along with Chest Pain and headache after pt walked to grocery store today. It continued to get worse so pt called 911.        EXTERNAL RECORDS REVIEWED  Nuclear medicine cardiac stress test completed 8/17/20 that was negative    HPI/ROS      Haile Chavez is a 58 y.o. male who presents with complaint of chest pain.  He states he started having chest pain earlier today.  The pain is dull in nature in the center of his chest, no alleviating exacerbating factors.  Complains of slight shortness of breath.  Patient denies fever, shakes, chills, sweats, cough, nausea, vomiting swelling of his lower extremities, rash, back pain.  The pain does not radiate to his neck, to his back, to his arms or to his jaw..  He states that he believes he had a myocardial infarction in 1978 but is not sure wheezing when he had stents placed.  Patient's been seen here multiple times for the same condition.  He is confused on the dates and if there is actual heart attack or if he had intervention or not.    PAST MEDICAL HISTORY   has a past medical history of Angina, Diabetes (HCC), Hepatitis C (5/24/1996), HTN (hypertension), benign (4/22/2008), Psychiatric disorder, PTSD (post-traumatic stress disorder), Schizo affective schizophrenia (HCC) (4/22/2010), Schizophrenia, schizo-affective type (HCC), and Tuberculosis (4/22/1992).    SURGICAL HISTORY   has a past surgical history that includes gastroscopy with biopsy (9/3/2010); other abdominal surgery (1982); gastroscopy with biopsy (11/7/2010); and other orthopedic surgery (2000).    FAMILY HISTORY  Family History   Problem Relation Age of Onset    Genetic Disorder Neg Hx        SOCIAL HISTORY  Social History     Tobacco Use    Smoking  "status: Every Day     Packs/day: 0.50     Years: 30.00     Pack years: 15.00     Types: Cigars, Cigarettes    Smokeless tobacco: Never    Tobacco comments:     \" 5 - 7 cigarettes a day. \"    Vaping Use    Vaping Use: Never used   Substance and Sexual Activity    Alcohol use: Not Currently    Drug use: No    Sexual activity: Yes     Partners: Female       CURRENT MEDICATIONS  Home Medications       Reviewed by Ema Willams R.N. (Registered Nurse) on 05/13/23 at 1110  Med List Status: Not Addressed     Medication Last Dose Status   acetaminophen (TYLENOL) 325 MG Tab  Active   bacitracin 500 UNIT/GM ointment  Active   baclofen (LIORESAL) 5 MG Tab  Active   benztropine (COGENTIN) 1 MG Tab  Active   benztropine (COGENTIN) 2 MG Tab  Active   clotrimazole (LOTRIMIN) 1 % Cream  Active   divalproex ER (DEPAKOTE ER) 500 MG TABLET SR 24 HR  Active   INVEGA SUSTENNA 234 MG/1.5ML Suspension Prefilled Syringe  Active   metFORMIN (GLUCOPHAGE) 500 MG Tab  Active   olanzapine (ZYPREXA) 20 MG tablet  Active   omeprazole (PRILOSEC) 40 MG delayed-release capsule  Active   pantoprazole (PROTONIX) 40 MG Tablet Delayed Response  Active   phenazopyridine (PYRIDIUM) 200 MG Tab  Active   STOOL SOFTENER 100 MG Cap  Active   trihexyphenidyl (ARTANE) 2 MG Tab  Active                    ALLERGIES  Allergies   Allergen Reactions    Hctz      Hyponatremia      Nsaids     Other Drug Nausea     All anti-inflammitories,  Headache and body aches.    Vicodin [Hydrocodone-Acetaminophen] Vomiting     Headaches    Amoxicillin Nausea     Headache and body aches       PHYSICAL EXAM  VITAL SIGNS: BP (!) 163/96   Pulse (!) 50   Temp 36.8 °C (98.3 °F)   Resp 18   Ht 1.88 m (6' 2\")   Wt 84.4 kg (186 lb)   SpO2 95%   BMI 23.88 kg/m²      Nursing notes and vitals reviewed.  Constitutional: Well developed, Well nourished, No acute distress, Non-toxic appearance.   Eyes: PERRLA, EOMI, Conjunctiva normal, No discharge.   HENT: Normocephalic, Atraumatic, or " dentition, moist mucous membranes, no facial edema   Cardiovascular: Normal heart rate, Normal rhythm, No murmurs, No rubs, No gallops.   Thorax & Lungs: No respiratory distress, No rales, No rhonchi, No wheezing, No chest tenderness.   Abdomen: Bowel sounds normal, Soft, No tenderness, No guarding, No rebound, No masses, No pulsatile masses.   Skin: Warm, Dry, No erythema, No rash.   Extremities: No deformity, no Edell edema, good range of motion range of motion upper lower extremes bilaterally  Neurologic: Alert & oriented x 3, no focal abnormalities noted, acting appropriately on examination  Musculoskeletal: No thoracic or lumbar tenderness  Psychiatric: Affect normal for clinical presentation.      DIAGNOSTIC STUDIES / PROCEDURES  EKG  I have independently interpreted this EKG  Sinus rhythm on monitor    LABS  DX-CHEST-PORTABLE (1 VIEW)   Final Result      No acute cardiopulmonary abnormality.   Chronic pleural parenchymal opacity and calcification in the right base.            RADIOLOGY  I have independently interpreted the diagnostic imaging associated with this visit and am waiting the final reading from the radiologist.   My preliminary interpretation is as follows: Chest x-ray is negative for infiltrate  Radiologist interpretation:   DX-CHEST-PORTABLE (1 VIEW)   Final Result      No acute cardiopulmonary abnormality.   Chronic pleural parenchymal opacity and calcification in the right base.            COURSE & MEDICAL DECISION MAKING    ED Observation Status? Yes; I am placing the patient in to an observation status due to a diagnostic uncertainty as well as therapeutic intensity. Patient placed in observation status at 11:25 AM, 5/13/2023.     Observation plan is as follows: EKG, delta troponin, reevaluation  Upon Reevaluation, the patient's condition has: Improved; and will be discharged.    Patient discharged from ED Observation status at 1345 (Time) 5/13/23 (Date).     INITIAL ASSESSMENT, COURSE AND  PLAN  Care Narrative: This is a 58-year-old gentleman presents with a heart score of 4 today with vague complaint of chest pain.  He has been seen here multiple times in the past with multiple visitations for the same work-ups.  Negative stress test approximately 3 years ago.  Here he has a negative delta troponin, his EKG is negative, I do not believe he has acute coronary syndrome.  In addition, he does not have hypoxia, tachypnea or tachycardia I do not suspect pulmonary embolism has a negative Wells criteria.  And he has no [place] history or clinical factors are consistent with aortic dissection or aortic aneurysm.  Patient may have reflux as he has had in the past and states that currently he feels fine for discharge.  Have asked him to follow the primary care physician strict return precautions have been given.  HTN/IDDM FOLLOW UP:  The patient has known hypertension and is being followed by their primary care doctor      ADDITIONAL PROBLEM LIST  Hypertension  Schizoaffective disorder: Which may be contributing to the patient's presentation today and multiple presentations in the past.  DISPOSITION AND DISCUSSIONS      Decision tools and prescription drugs considered including, but not limited to:  The patient has negative Wells score for DVT or pulmonary embolism, no historical or clinical complaints significant for aortic dissection, aortic aneurysm although this was considered. .    FINAL DIAGNOSIS  1. Chest pain, unspecified type    2.  Schizoaffective disorder        DISPOSITION:  Patient will be discharged home in stable condition.    FOLLOW UP:  [facility], Emergency Dept  [address] [facility] [id number] [phone]    If symptoms worsen    [address] [facility] [address]  [phone]          OUTPATIENT MEDICATIONS:  Discharge Medication List as of 5/13/2023  2:22 PM            Electronically signed by: Khanh Leblanc D.O., 5/13/2023 11:25  AM

## 2023-05-13 NOTE — ED NOTES
Pt educated regarding discharge instructions and demonstrated understanding. Pt ambulatory upon discharge but taken out via wheelchair for comfort. Pt does not appear to be in any distress at this time. Pt's discharge paperwork and belongings sent home with pt.

## 2023-05-13 NOTE — ED TRIAGE NOTES
Chief Complaint   Patient presents with    Chest Pain     Since today, Pt was walking to store and experienced SOB and chest pain. Pt has history of MI with stent placement 20 years ago.    Shortness of Breath     SOB along with Chest Pain and headache after pt walked to grocery store today. It continued to get worse so pt called 911.      Pt brought to [facility] for above complaint. [facility] en-route 139, Pt also complaining of productive cough for 3 days. Pt complaining of 7/10 chest pain. Pt alert and oriented x 4, VSS, breathing steady and unlabored on RA at this time. Pt does not wear O2 at home.

## 2023-05-25 ENCOUNTER — HOSPITAL ENCOUNTER (EMERGENCY)
Facility: MEDICAL CENTER | Age: 58
End: 2023-05-25
Attending: EMERGENCY MEDICINE
Payer: MEDICARE

## 2023-05-25 ENCOUNTER — APPOINTMENT (OUTPATIENT)
Dept: RADIOLOGY | Facility: MEDICAL CENTER | Age: 58
End: 2023-05-25
Attending: EMERGENCY MEDICINE
Payer: MEDICARE

## 2023-05-25 VITALS
HEART RATE: 80 BPM | RESPIRATION RATE: 16 BRPM | BODY MASS INDEX: 22.35 KG/M2 | DIASTOLIC BLOOD PRESSURE: 94 MMHG | HEIGHT: 74 IN | TEMPERATURE: 98 F | WEIGHT: 174.16 LBS | OXYGEN SATURATION: 96 % | SYSTOLIC BLOOD PRESSURE: 162 MMHG

## 2023-05-25 DIAGNOSIS — R07.9 CHEST PAIN, UNSPECIFIED TYPE: ICD-10-CM

## 2023-05-25 DIAGNOSIS — F25.8 OTHER SCHIZOAFFECTIVE DISORDERS (HCC): ICD-10-CM

## 2023-05-25 LAB
ALBUMIN SERPL BCP-MCNC: 4.1 G/DL (ref 3.2–4.9)
ALBUMIN/GLOB SERPL: 1.4 G/DL
ALP SERPL-CCNC: 75 U/L (ref 30–99)
ALT SERPL-CCNC: 10 U/L (ref 2–50)
ANION GAP SERPL CALC-SCNC: 12 MMOL/L (ref 7–16)
AST SERPL-CCNC: 16 U/L (ref 12–45)
BASOPHILS # BLD AUTO: 0.9 % (ref 0–1.8)
BASOPHILS # BLD: 0.04 K/UL (ref 0–0.12)
BILIRUB SERPL-MCNC: 0.4 MG/DL (ref 0.1–1.5)
BUN SERPL-MCNC: 4 MG/DL (ref 8–22)
CALCIUM ALBUM COR SERPL-MCNC: 9.1 MG/DL (ref 8.5–10.5)
CALCIUM SERPL-MCNC: 9.2 MG/DL (ref 8.5–10.5)
CHLORIDE SERPL-SCNC: 100 MMOL/L (ref 96–112)
CO2 SERPL-SCNC: 23 MMOL/L (ref 20–33)
CREAT SERPL-MCNC: 0.85 MG/DL (ref 0.5–1.4)
EKG IMPRESSION: NORMAL
EOSINOPHIL # BLD AUTO: 0.46 K/UL (ref 0–0.51)
EOSINOPHIL NFR BLD: 10.2 % (ref 0–6.9)
ERYTHROCYTE [DISTWIDTH] IN BLOOD BY AUTOMATED COUNT: 41.1 FL (ref 35.9–50)
GFR SERPLBLD CREATININE-BSD FMLA CKD-EPI: 99 ML/MIN/1.73 M 2
GLOBULIN SER CALC-MCNC: 2.9 G/DL (ref 1.9–3.5)
GLUCOSE SERPL-MCNC: 119 MG/DL (ref 65–99)
HCT VFR BLD AUTO: 38.3 % (ref 42–52)
HGB BLD-MCNC: 13.5 G/DL (ref 14–18)
IMM GRANULOCYTES # BLD AUTO: 0.01 K/UL (ref 0–0.11)
IMM GRANULOCYTES NFR BLD AUTO: 0.2 % (ref 0–0.9)
LYMPHOCYTES # BLD AUTO: 1.48 K/UL (ref 1–4.8)
LYMPHOCYTES NFR BLD: 32.9 % (ref 22–41)
MCH RBC QN AUTO: 30.4 PG (ref 27–33)
MCHC RBC AUTO-ENTMCNC: 35.2 G/DL (ref 32.3–36.5)
MCV RBC AUTO: 86.3 FL (ref 81.4–97.8)
MONOCYTES # BLD AUTO: 0.48 K/UL (ref 0–0.85)
MONOCYTES NFR BLD AUTO: 10.7 % (ref 0–13.4)
NEUTROPHILS # BLD AUTO: 2.03 K/UL (ref 1.82–7.42)
NEUTROPHILS NFR BLD: 45.1 % (ref 44–72)
NRBC # BLD AUTO: 0 K/UL
NRBC BLD-RTO: 0 /100 WBC (ref 0–0.2)
PLATELET # BLD AUTO: 150 K/UL (ref 164–446)
PMV BLD AUTO: 9.9 FL (ref 9–12.9)
POTASSIUM SERPL-SCNC: 3.6 MMOL/L (ref 3.6–5.5)
PROT SERPL-MCNC: 7 G/DL (ref 6–8.2)
RBC # BLD AUTO: 4.44 M/UL (ref 4.7–6.1)
SODIUM SERPL-SCNC: 135 MMOL/L (ref 135–145)
TROPONIN T SERPL-MCNC: <6 NG/L (ref 6–19)
WBC # BLD AUTO: 4.5 K/UL (ref 4.8–10.8)

## 2023-05-25 PROCEDURE — 84484 ASSAY OF TROPONIN QUANT: CPT

## 2023-05-25 PROCEDURE — 99284 EMERGENCY DEPT VISIT MOD MDM: CPT

## 2023-05-25 PROCEDURE — 85025 COMPLETE CBC W/AUTO DIFF WBC: CPT

## 2023-05-25 PROCEDURE — 71045 X-RAY EXAM CHEST 1 VIEW: CPT

## 2023-05-25 PROCEDURE — 93005 ELECTROCARDIOGRAM TRACING: CPT | Performed by: EMERGENCY MEDICINE

## 2023-05-25 PROCEDURE — 36415 COLL VENOUS BLD VENIPUNCTURE: CPT

## 2023-05-25 PROCEDURE — 93005 ELECTROCARDIOGRAM TRACING: CPT

## 2023-05-25 PROCEDURE — 80053 COMPREHEN METABOLIC PANEL: CPT

## 2023-05-25 ASSESSMENT — FIBROSIS 4 INDEX: FIB4 SCORE: 1.79

## 2023-05-25 NOTE — ED PROVIDER NOTES
ER Provider Note    Scribed for Ivan Evans M.d. by June Crenshaw. 5/25/2023  2:09 PM    Primary Care Provider: Pcp Not Noted    CHIEF COMPLAINT  Chief Complaint   Patient presents with    Chest Pain     X3days   Denies SOB     EXTERNAL RECORDS REVIEWED  Outpatient Notes reviewed last stress test which was performed in 2020 that showed an ejection fraction of 60% without any ischemic changes    HPI/ROS  LIMITATION TO HISTORY   Select: : None  OUTSIDE HISTORIAN(S):  None.    Haile Chavez is a 58 y.o. male who presents to the ED complaining of intermittent chest pain onset 3 days ago. The patient states he also has pan when breathing in, and headache, but denies any shortness of breath. He describes that his pain is on both sides of his chest. He notes that any form of physical activity exacerbates the chest pain. He rates his chest pain as a 9/10 in severity. He reports that he has a history of a heart attack. He currently is not on any heart medication. He reports that he does take Artane. He also notes that he has a bipolar disorder. He reports that he does have a history of seizures, with his last one being 2 weeks ago. He notes that he was addicted to marijuana and cocaine. He denies any recent use of cocaine. He reports that he does smoke tobacco.     PAST MEDICAL HISTORY  Past Medical History:   Diagnosis Date    Angina     Diabetes (HCC)     Hepatitis C 5/24/1996    HTN (hypertension), benign 4/22/2008    Psychiatric disorder     Schizoaffective    PTSD (post-traumatic stress disorder)     Schizo affective schizophrenia (HCC) 4/22/2010    Schizophrenia, schizo-affective type (HCC)     Tuberculosis 4/22/1992     SURGICAL HISTORY  Past Surgical History:   Procedure Laterality Date    GASTROSCOPY WITH BIOPSY  11/7/2010    Performed by MARY SAENZ at ENDOSCOPY [facility]S    GASTROSCOPY WITH BIOPSY  9/3/2010    Performed by DIONNE KELLEY at ENDOSCOPY [facility] ORS    OTHER  ORTHOPEDIC SURGERY  2000    left ankle repair, total fusion.    OTHER ABDOMINAL SURGERY  1982    hernia repair     FAMILY HISTORY  Family History   Problem Relation Age of Onset    Genetic Disorder Neg Hx      SOCIAL HISTORY   reports that he has been smoking cigars. He has a 15.00 pack-year smoking history. He has never used smokeless tobacco. He reports that he does not currently use alcohol. He reports that he does not use drugs.    CURRENT MEDICATIONS  Previous Medications    ACETAMINOPHEN (TYLENOL) 325 MG TAB    Take 1 Tablet by mouth every four hours as needed for Mild Pain or Moderate Pain.    BACITRACIN 500 UNIT/GM OINTMENT    Apply 1 Each topically 2 times a day.    BACLOFEN (LIORESAL) 5 MG TAB    Take 1 Tab by mouth 2 Times a Day. TAKE 1 TABLET BY MOUTH TWICE A DAY    BENZTROPINE (COGENTIN) 1 MG TAB    Take 1 mg by mouth 2 times a day. Take one tablet twice a day along with a 2 mg tablet for a total of 3 mg    BENZTROPINE (COGENTIN) 2 MG TAB    Take 2 mg by mouth 2 times a day. Take one tablet twice a day along with a 1 mg tablet for a total of 3 mg.    CLOTRIMAZOLE (LOTRIMIN) 1 % CREAM    Apply 1 Application topically 2 times a day.    DIVALPROEX ER (DEPAKOTE ER) 500 MG TABLET SR 24 HR    Take 2 Tabs by mouth 2 Times a Day.    INVEGA SUSTENNA 234 MG/1.5ML SUSPENSION PREFILLED SYRINGE    234 mg by Intramuscular route Once. Once every 21 days.    METFORMIN (GLUCOPHAGE) 500 MG TAB    Take 1 Tab by mouth every day.    OLANZAPINE (ZYPREXA) 20 MG TABLET    Take 20 mg by mouth every bedtime.    OMEPRAZOLE (PRILOSEC) 40 MG DELAYED-RELEASE CAPSULE    Take 1 Capsule by mouth every day.    PANTOPRAZOLE (PROTONIX) 40 MG TABLET DELAYED RESPONSE    Take 1 Tab by mouth 2 times a day.    PHENAZOPYRIDINE (PYRIDIUM) 200 MG TAB    Take 1 Tab by mouth 3 times a day as needed.    STOOL SOFTENER 100 MG CAP    TAKE 1 CAPSULE BY MOUTH TWICE A DAY    TRIHEXYPHENIDYL (ARTANE) 2 MG TAB    Take 2 mg by mouth every day. Take 1 tablet  "once daily     ALLERGIES  Hctz, Nsaids, Other drug, Vicodin [hydrocodone-acetaminophen], and Amoxicillin    PHYSICAL EXAM  /79   Pulse 78   Temp 36.7 °C (98 °F) (Temporal)   Resp 18   Ht 1.88 m (6' 2\")   Wt 79 kg (174 lb 2.6 oz)   SpO2 98%   BMI 22.36 kg/m²     Constitutional: Well developed, Well nourished, No acute distress, Non-toxic appearance.   HENT: Normocephalic, Atraumatic, Bilateral external ears normal, Oropharynx is clear mucous membranes are moist. No oral exudates or nasal discharge.   Eyes: Pupils are equal round and reactive, EOMI, Conjunctiva normal, No discharge.   Neck: Normal range of motion, No tenderness, Supple, No stridor. No meningismus.  Lymphatic: No lymphadenopathy noted.   Cardiovascular: Regular rate and rhythm without murmur rub or gallop.  Thorax & Lungs: Clear breath sounds bilaterally without wheezes, rhonchi or rales. There is no chest wall tenderness.   Abdomen: Soft non-tender non-distended. There is no rebound or guarding. No organomegaly is appreciated. Bowel sounds are normal.  Skin: Normal without rash.   Back: No CVA or spinal tenderness.   Extremities: Intact distal pulses, No edema, No tenderness, No cyanosis, No clubbing. Capillary refill is less than 2 seconds.  Musculoskeletal: Good range of motion in all major joints. No tenderness to palpation or major deformities noted.   Neurologic: Alert & oriented x 3, Normal motor function, Normal sensory function, No focal deficits noted. Reflexes are normal.  Psychiatric: Affect normal, Judgment normal, Mood normal. There is no suicidal ideation or patient reported hallucinations.      DIAGNOSTIC STUDIES    Labs:   Evaluation reveals no leukocytosis, shift, anemia, electrolyte derangements or acidosis normal troponin which has good negative predictive value given he has had pain for 3 days    EKG:   I have independently interpreted this EKG  Results for orders placed or performed during the hospital encounter of " 23   EKG   Result Value Ref Range    Report       [facility] Emergency Dept.    Test Date:  2023  Pt Name:    JAYMIE SINGH                Department: ER  MRN:        [id number]                      Room:  Gender:     Male                         Technician: [id number]  :        1965                   Requested By:ER TRIAGE PROTOCOL  Order #:    [id number]                    Reading MD: CHONG SOOD MD    Measurements  Intervals                                Axis  Rate:       64                           P:          44  MI:         203                          QRS:        58  QRSD:       94                           T:          8  QT:         406  QTc:        419    Interpretive Statements  Sinus rhythm  Borderline prolonged MI interval  Baseline wander in lead(s) V1  Compared to ECG 2023 11:04:18  Myocardial infarct finding no longer present  Electronically Signed On 2023 14:31:22 PDT by CHONG SOOD MD            Radiology:   The attending emergency physician has independently interpreted the diagnostic imaging associated with this visit and am waiting the final reading from the radiologist.   Preliminary interpretation is a follows: No cardiomegaly  Radiologist interpretation:   DX-CHEST-PORTABLE (1 VIEW)   Final Result      Chronic right basilar scarring and pleural calcification. No new consolidation. No effusions.               COURSE & MEDICAL DECISION MAKING     ED Observation Status? No; Patient does not meet criteria for ED Observation.     INITIAL ASSESSMENT, COURSE AND PLAN  Care Narrative: Patient has been in the emergency department frequently in recent years and does have behavioral health issues which I believe is contributing to his frequency.  Comes today with chest pain has been going on 3 days.  Reviewed his nuclear medicine stress test back in  which is unremarkable with no ischemic changes and an ejection fraction of 60%.  He had no red flags such  as leg pain, swelling, sharp stabbing chest discomfort, ischemic changes on EKG or otherwise    His laboratory evaluation is unremarkable with normal troponin which has good negative predictive value of this far out from his initial chest pain 3 days ago.  I do not think he needs a second troponin and I do not think he needs admission or any additional testing such as echo or stress test at this time      DISPOSITION AND DISCUSSIONS    Escalation of care considered, and ultimately not performed: acute inpatient care management, however at this time, the patient is most appropriate for outpatient management.    Barriers to care at this time, including but not limited to: Patient does not have established PCP.     Decision tools and prescription drugs considered including, but not limited to: HEART Score 2 .    FINAL DIANGOSIS  1. Chest pain, unspecified type    2. Other schizoaffective disorders (HCC)         June PAGE ([email or web address]), am scribing for, and in the presence of, Ivan Evans M.D..    Electronically signed by: June Crenshaw (Ngoc), 5/25/2023    IIvan M.D. personally performed the services described in this documentation, as scribed by June Crenshaw in my presence, and it is both accurate and complete.      The note accurately reflects work and decisions made by me.  Ivan Evans M.D.  5/25/2023  3:35 PM

## 2023-05-25 NOTE — ED NOTES
Discharge instructions given. Pt should follow up with primary care doctor. Return to ED for worsening symptoms.

## 2023-05-25 NOTE — ED TRIAGE NOTES
"Chief Complaint   Patient presents with    Chest Pain     X3days   Denies SOB     /79   Pulse 78   Temp 36.7 °C (98 °F) (Temporal)   Resp 18   Ht 1.88 m (6' 2\")   Wt 79 kg (174 lb 2.6 oz)   SpO2 98%   BMI 22.36 kg/m²     Pt also states he has been unable to sleep for the past 5 days   "

## 2023-05-29 ENCOUNTER — HOSPITAL ENCOUNTER (EMERGENCY)
Facility: MEDICAL CENTER | Age: 58
End: 2023-05-29
Attending: EMERGENCY MEDICINE
Payer: MEDICARE

## 2023-05-29 VITALS
TEMPERATURE: 97 F | SYSTOLIC BLOOD PRESSURE: 191 MMHG | WEIGHT: 185 LBS | HEIGHT: 74 IN | HEART RATE: 61 BPM | BODY MASS INDEX: 23.74 KG/M2 | OXYGEN SATURATION: 96 % | DIASTOLIC BLOOD PRESSURE: 102 MMHG | RESPIRATION RATE: 18 BRPM

## 2023-05-29 DIAGNOSIS — Z59.00 HOMELESSNESS: ICD-10-CM

## 2023-05-29 DIAGNOSIS — S00.83XA CONTUSION OF FACE, INITIAL ENCOUNTER: ICD-10-CM

## 2023-05-29 DIAGNOSIS — S80.212A ABRASION OF LEFT KNEE, INITIAL ENCOUNTER: ICD-10-CM
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
## 2023-05-29 DIAGNOSIS — T07.XXXA MULTIPLE CONTUSIONS: ICD-10-CM

## 2023-05-29 PROCEDURE — 700102 HCHG RX REV CODE 250 W/ 637 OVERRIDE(OP): Performed by: EMERGENCY MEDICINE

## 2023-05-29 PROCEDURE — 99283 EMERGENCY DEPT VISIT LOW MDM: CPT

## 2023-05-29 PROCEDURE — A9270 NON-COVERED ITEM OR SERVICE: HCPCS | Performed by: EMERGENCY MEDICINE

## 2023-05-29 RX ADMIN — PHENYLEPHRINE HYDROCHLORIDE 1 SPRAY: 1 SPRAY NASAL at 07:17

## 2023-05-29 SDOH — ECONOMIC STABILITY - HOUSING INSECURITY: HOMELESSNESS UNSPECIFIED: Z59.00

## 2023-05-29 ASSESSMENT — PAIN DESCRIPTION - PAIN TYPE: TYPE: ACUTE PAIN

## 2023-05-29 ASSESSMENT — FIBROSIS 4 INDEX: FIB4 SCORE: 2.02
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
## 2023-05-29 NOTE — ED NOTES
Pt being verbally aggressive to staff.  Pt demanding to leave and see the doctor. Pt reassured and educated. ERP aware.

## 2023-05-29 NOTE — DISCHARGE INSTRUCTIONS
Keep abrasions clean and dry.  Turn for pain or other concerns.  Follow-up with [facility] doctor or [facility].  Have your blood pressure rechecked within a week it was elevated.

## 2023-05-29 NOTE — ED NOTES
Patient was educated on discharge instructions.  Patient was informed about diagnosis, symptom management, risks, and home care instructions.  Patient verbalized understanding and signed discharge instructions. Copy of discharge instructions in chart.  Patient ambulated out with steady gait.  Patient has personal belongings.
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
## 2023-06-04 ENCOUNTER — APPOINTMENT (OUTPATIENT)
Dept: RADIOLOGY | Facility: MEDICAL CENTER | Age: 58
End: 2023-06-04
Attending: EMERGENCY MEDICINE
Payer: MEDICARE

## 2023-06-04 ENCOUNTER — HOSPITAL ENCOUNTER (EMERGENCY)
Facility: MEDICAL CENTER | Age: 58
End: 2023-06-04
Attending: EMERGENCY MEDICINE
Payer: MEDICARE

## 2023-06-04 VITALS
HEART RATE: 64 BPM | DIASTOLIC BLOOD PRESSURE: 88 MMHG | WEIGHT: 185 LBS | BODY MASS INDEX: 23.74 KG/M2 | TEMPERATURE: 98.3 F | RESPIRATION RATE: 18 BRPM | HEIGHT: 74 IN | SYSTOLIC BLOOD PRESSURE: 157 MMHG | OXYGEN SATURATION: 92 %

## 2023-06-04 DIAGNOSIS — R07.89 ATYPICAL CHEST PAIN: ICD-10-CM

## 2023-06-04 LAB
ALBUMIN SERPL BCP-MCNC: 4 G/DL (ref 3.2–4.9)
ALBUMIN/GLOB SERPL: 1.3 G/DL
ALP SERPL-CCNC: 98 U/L (ref 30–99)
ALT SERPL-CCNC: 18 U/L (ref 2–50)
ANION GAP SERPL CALC-SCNC: 10 MMOL/L (ref 7–16)
AST SERPL-CCNC: 22 U/L (ref 12–45)
BASOPHILS # BLD AUTO: 1 % (ref 0–1.8)
BASOPHILS # BLD: 0.05 K/UL (ref 0–0.12)
BILIRUB SERPL-MCNC: 0.2 MG/DL (ref 0.1–1.5)
BUN SERPL-MCNC: 12 MG/DL (ref 8–22)
CALCIUM ALBUM COR SERPL-MCNC: 9.3 MG/DL (ref 8.5–10.5)
CALCIUM SERPL-MCNC: 9.3 MG/DL (ref 8.5–10.5)
CHLORIDE SERPL-SCNC: 99 MMOL/L (ref 96–112)
CO2 SERPL-SCNC: 24 MMOL/L (ref 20–33)
CREAT SERPL-MCNC: 0.69 MG/DL (ref 0.5–1.4)
EKG IMPRESSION: NORMAL
EKG IMPRESSION: NORMAL
EOSINOPHIL # BLD AUTO: 0.81 K/UL (ref 0–0.51)
EOSINOPHIL NFR BLD: 16.1 % (ref 0–6.9)
ERYTHROCYTE [DISTWIDTH] IN BLOOD BY AUTOMATED COUNT: 43.8 FL (ref 35.9–50)
GFR SERPLBLD CREATININE-BSD FMLA CKD-EPI: 106 ML/MIN/1.73 M 2
GLOBULIN SER CALC-MCNC: 3.1 G/DL (ref 1.9–3.5)
GLUCOSE SERPL-MCNC: 89 MG/DL (ref 65–99)
HCT VFR BLD AUTO: 39.1 % (ref 42–52)
HGB BLD-MCNC: 13.3 G/DL (ref 14–18)
IMM GRANULOCYTES # BLD AUTO: 0.01 K/UL (ref 0–0.11)
IMM GRANULOCYTES NFR BLD AUTO: 0.2 % (ref 0–0.9)
LYMPHOCYTES # BLD AUTO: 1.43 K/UL (ref 1–4.8)
LYMPHOCYTES NFR BLD: 28.4 % (ref 22–41)
MCH RBC QN AUTO: 30.5 PG (ref 27–33)
MCHC RBC AUTO-ENTMCNC: 34 G/DL (ref 32.3–36.5)
MCV RBC AUTO: 89.7 FL (ref 81.4–97.8)
MONOCYTES # BLD AUTO: 0.57 K/UL (ref 0–0.85)
MONOCYTES NFR BLD AUTO: 11.3 % (ref 0–13.4)
NEUTROPHILS # BLD AUTO: 2.16 K/UL (ref 1.82–7.42)
NEUTROPHILS NFR BLD: 43 % (ref 44–72)
NRBC # BLD AUTO: 0 K/UL
NRBC BLD-RTO: 0 /100 WBC (ref 0–0.2)
PLATELET # BLD AUTO: 203 K/UL (ref 164–446)
PMV BLD AUTO: 9.4 FL (ref 9–12.9)
POTASSIUM SERPL-SCNC: 4.2 MMOL/L (ref 3.6–5.5)
PROT SERPL-MCNC: 7.1 G/DL (ref 6–8.2)
RBC # BLD AUTO: 4.36 M/UL (ref 4.7–6.1)
SODIUM SERPL-SCNC: 133 MMOL/L (ref 135–145)
TROPONIN T SERPL-MCNC: <6 NG/L (ref 6–19)
TROPONIN T SERPL-MCNC: <6 NG/L (ref 6–19)
WBC # BLD AUTO: 5 K/UL (ref 4.8–10.8)

## 2023-06-04 PROCEDURE — 36415 COLL VENOUS BLD VENIPUNCTURE: CPT

## 2023-06-04 PROCEDURE — 84484 ASSAY OF TROPONIN QUANT: CPT | Mod: 91

## 2023-06-04 PROCEDURE — 93005 ELECTROCARDIOGRAM TRACING: CPT | Performed by: EMERGENCY MEDICINE

## 2023-06-04 PROCEDURE — 73562 X-RAY EXAM OF KNEE 3: CPT | Mod: RT

## 2023-06-04 PROCEDURE — 80053 COMPREHEN METABOLIC PANEL: CPT

## 2023-06-04 PROCEDURE — 93005 ELECTROCARDIOGRAM TRACING: CPT

## 2023-06-04 PROCEDURE — 85025 COMPLETE CBC W/AUTO DIFF WBC: CPT

## 2023-06-04 PROCEDURE — 71045 X-RAY EXAM CHEST 1 VIEW: CPT

## 2023-06-04 PROCEDURE — 99284 EMERGENCY DEPT VISIT MOD MDM: CPT

## 2023-06-04 PROCEDURE — 73562 X-RAY EXAM OF KNEE 3: CPT | Mod: LT

## 2023-06-04 ASSESSMENT — FIBROSIS 4 INDEX: FIB4 SCORE: 2.02

## 2023-06-04 ASSESSMENT — LIFESTYLE VARIABLES: DO YOU DRINK ALCOHOL: NO

## 2023-06-04 NOTE — ED NOTES
H/o schizoaffective, EMS reports that pt has been looking up things on his cell phone and reported to them multiple things that he thinks is wrong.

## 2023-06-04 NOTE — ED NOTES
IV access obtained.  Blood drawn and sent to lab.    Pt requesting pain medication for left knee. ERP informed.

## 2023-06-04 NOTE — ED NOTES
"Pt BIB EMS from home.  Pt reports chest pain this morning when laying down for a nap and getting ready to do chores.  Pt reports pain to right side of chest in to mid chest, reports \"It feels like everything has shifted.\"  ERP to see.   "
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
## 2023-07-16 ENCOUNTER — APPOINTMENT (OUTPATIENT)
Dept: RADIOLOGY | Facility: MEDICAL CENTER | Age: 58
End: 2023-07-16
Attending: EMERGENCY MEDICINE
Payer: MEDICARE

## 2023-07-16 ENCOUNTER — APPOINTMENT (OUTPATIENT)
Dept: CARDIOLOGY | Facility: MEDICAL CENTER | Age: 58
End: 2023-07-16
Payer: MEDICARE

## 2023-07-16 ENCOUNTER — HOSPITAL ENCOUNTER (OUTPATIENT)
Facility: MEDICAL CENTER | Age: 58
End: 2023-07-17
Attending: EMERGENCY MEDICINE | Admitting: HOSPITALIST
Payer: MEDICARE

## 2023-07-16 DIAGNOSIS — R07.9 CHEST PAIN, UNSPECIFIED TYPE: ICD-10-CM

## 2023-07-16 DIAGNOSIS — R00.1 SYMPTOMATIC BRADYCARDIA: ICD-10-CM

## 2023-07-16 DIAGNOSIS — I10 HTN (HYPERTENSION), BENIGN: ICD-10-CM

## 2023-07-16 PROBLEM — E86.0 DEHYDRATION: Status: ACTIVE | Noted: 2023-07-16

## 2023-07-16 LAB
ALBUMIN SERPL BCP-MCNC: 4.1 G/DL (ref 3.2–4.9)
ALBUMIN/GLOB SERPL: 1.6 G/DL
ALP SERPL-CCNC: 56 U/L (ref 30–99)
ALT SERPL-CCNC: 12 U/L (ref 2–50)
ANION GAP SERPL CALC-SCNC: 12 MMOL/L (ref 7–16)
AST SERPL-CCNC: 13 U/L (ref 12–45)
BASOPHILS # BLD AUTO: 1.1 % (ref 0–1.8)
BASOPHILS # BLD: 0.04 K/UL (ref 0–0.12)
BILIRUB SERPL-MCNC: 0.3 MG/DL (ref 0.1–1.5)
BUN SERPL-MCNC: 8 MG/DL (ref 8–22)
CALCIUM ALBUM COR SERPL-MCNC: 8.7 MG/DL (ref 8.5–10.5)
CALCIUM SERPL-MCNC: 8.8 MG/DL (ref 8.5–10.5)
CHLORIDE SERPL-SCNC: 103 MMOL/L (ref 96–112)
CO2 SERPL-SCNC: 21 MMOL/L (ref 20–33)
CREAT SERPL-MCNC: 1.21 MG/DL (ref 0.5–1.4)
EKG IMPRESSION: NORMAL
EKG IMPRESSION: NORMAL
EOSINOPHIL # BLD AUTO: 0.27 K/UL (ref 0–0.51)
EOSINOPHIL NFR BLD: 7.4 % (ref 0–6.9)
ERYTHROCYTE [DISTWIDTH] IN BLOOD BY AUTOMATED COUNT: 42.6 FL (ref 35.9–50)
GFR SERPLBLD CREATININE-BSD FMLA CKD-EPI: 68 ML/MIN/1.73 M 2
GLOBULIN SER CALC-MCNC: 2.6 G/DL (ref 1.9–3.5)
GLUCOSE SERPL-MCNC: 100 MG/DL (ref 65–99)
HCT VFR BLD AUTO: 37.4 % (ref 42–52)
HGB BLD-MCNC: 12.6 G/DL (ref 14–18)
IMM GRANULOCYTES # BLD AUTO: 0 K/UL (ref 0–0.11)
IMM GRANULOCYTES NFR BLD AUTO: 0 % (ref 0–0.9)
LACTATE SERPL-SCNC: 1.9 MMOL/L (ref 0.5–2)
LV EJECT FRACT  99904: 55
LV EJECT FRACT MOD 2C 99903: 68.41
LV EJECT FRACT MOD 4C 99902: 56.33
LV EJECT FRACT MOD BP 99901: 63.98
LYMPHOCYTES # BLD AUTO: 1.41 K/UL (ref 1–4.8)
LYMPHOCYTES NFR BLD: 38.5 % (ref 22–41)
MAGNESIUM SERPL-MCNC: 1.6 MG/DL (ref 1.5–2.5)
MCH RBC QN AUTO: 30.4 PG (ref 27–33)
MCHC RBC AUTO-ENTMCNC: 33.7 G/DL (ref 32.3–36.5)
MCV RBC AUTO: 90.1 FL (ref 81.4–97.8)
MONOCYTES # BLD AUTO: 0.39 K/UL (ref 0–0.85)
MONOCYTES NFR BLD AUTO: 10.7 % (ref 0–13.4)
NEUTROPHILS # BLD AUTO: 1.55 K/UL (ref 1.82–7.42)
NEUTROPHILS NFR BLD: 42.3 % (ref 44–72)
NRBC # BLD AUTO: 0 K/UL
NRBC BLD-RTO: 0 /100 WBC (ref 0–0.2)
PLATELET # BLD AUTO: 152 K/UL (ref 164–446)
PMV BLD AUTO: 9.9 FL (ref 9–12.9)
POTASSIUM SERPL-SCNC: 3.9 MMOL/L (ref 3.6–5.5)
PROT SERPL-MCNC: 6.7 G/DL (ref 6–8.2)
RBC # BLD AUTO: 4.15 M/UL (ref 4.7–6.1)
SODIUM SERPL-SCNC: 136 MMOL/L (ref 135–145)
TROPONIN T SERPL-MCNC: 6 NG/L (ref 6–19)
TROPONIN T SERPL-MCNC: <6 NG/L (ref 6–19)
TSH SERPL DL<=0.005 MIU/L-ACNC: 1.27 UIU/ML (ref 0.38–5.33)
WBC # BLD AUTO: 3.7 K/UL (ref 4.8–10.8)

## 2023-07-16 PROCEDURE — 96375 TX/PRO/DX INJ NEW DRUG ADDON: CPT

## 2023-07-16 PROCEDURE — 84443 ASSAY THYROID STIM HORMONE: CPT

## 2023-07-16 PROCEDURE — G0378 HOSPITAL OBSERVATION PER HR: HCPCS

## 2023-07-16 PROCEDURE — 83735 ASSAY OF MAGNESIUM: CPT

## 2023-07-16 PROCEDURE — 96367 TX/PROPH/DG ADDL SEQ IV INF: CPT

## 2023-07-16 PROCEDURE — 96365 THER/PROPH/DIAG IV INF INIT: CPT

## 2023-07-16 PROCEDURE — 96365 THER/PROPH/DIAG IV INF INIT: CPT | Mod: XU

## 2023-07-16 PROCEDURE — 93005 ELECTROCARDIOGRAM TRACING: CPT | Performed by: EMERGENCY MEDICINE

## 2023-07-16 PROCEDURE — A9270 NON-COVERED ITEM OR SERVICE: HCPCS | Performed by: EMERGENCY MEDICINE

## 2023-07-16 PROCEDURE — 700102 HCHG RX REV CODE 250 W/ 637 OVERRIDE(OP)

## 2023-07-16 PROCEDURE — 85025 COMPLETE CBC W/AUTO DIFF WBC: CPT

## 2023-07-16 PROCEDURE — 93306 TTE W/DOPPLER COMPLETE: CPT

## 2023-07-16 PROCEDURE — 83605 ASSAY OF LACTIC ACID: CPT

## 2023-07-16 PROCEDURE — 700111 HCHG RX REV CODE 636 W/ 250 OVERRIDE (IP): Mod: JZ | Performed by: EMERGENCY MEDICINE

## 2023-07-16 PROCEDURE — 80053 COMPREHEN METABOLIC PANEL: CPT

## 2023-07-16 PROCEDURE — 700105 HCHG RX REV CODE 258: Mod: JZ

## 2023-07-16 PROCEDURE — 93005 ELECTROCARDIOGRAM TRACING: CPT

## 2023-07-16 PROCEDURE — 87040 BLOOD CULTURE FOR BACTERIA: CPT

## 2023-07-16 PROCEDURE — 700111 HCHG RX REV CODE 636 W/ 250 OVERRIDE (IP)

## 2023-07-16 PROCEDURE — 96375 TX/PRO/DX INJ NEW DRUG ADDON: CPT | Mod: XU

## 2023-07-16 PROCEDURE — A9270 NON-COVERED ITEM OR SERVICE: HCPCS

## 2023-07-16 PROCEDURE — 71045 X-RAY EXAM CHEST 1 VIEW: CPT

## 2023-07-16 PROCEDURE — 84484 ASSAY OF TROPONIN QUANT: CPT

## 2023-07-16 PROCEDURE — 99223 1ST HOSP IP/OBS HIGH 75: CPT | Mod: 25 | Performed by: HOSPITALIST

## 2023-07-16 PROCEDURE — 99406 BEHAV CHNG SMOKING 3-10 MIN: CPT | Performed by: HOSPITALIST

## 2023-07-16 PROCEDURE — 99285 EMERGENCY DEPT VISIT HI MDM: CPT

## 2023-07-16 PROCEDURE — 700105 HCHG RX REV CODE 258: Performed by: EMERGENCY MEDICINE

## 2023-07-16 PROCEDURE — 700102 HCHG RX REV CODE 250 W/ 637 OVERRIDE(OP): Performed by: EMERGENCY MEDICINE

## 2023-07-16 PROCEDURE — 93010 ELECTROCARDIOGRAM REPORT: CPT | Performed by: INTERNAL MEDICINE

## 2023-07-16 PROCEDURE — 36415 COLL VENOUS BLD VENIPUNCTURE: CPT

## 2023-07-16 PROCEDURE — 93306 TTE W/DOPPLER COMPLETE: CPT | Mod: 26 | Performed by: INTERNAL MEDICINE

## 2023-07-16 RX ORDER — ONDANSETRON 2 MG/ML
4 INJECTION INTRAMUSCULAR; INTRAVENOUS ONCE
Status: COMPLETED | OUTPATIENT
Start: 2023-07-16 | End: 2023-07-16

## 2023-07-16 RX ORDER — NICOTINE 21 MG/24HR
21 PATCH, TRANSDERMAL 24 HOURS TRANSDERMAL
Status: DISCONTINUED | OUTPATIENT
Start: 2023-07-16 | End: 2023-07-17 | Stop reason: HOSPADM

## 2023-07-16 RX ORDER — LUMATEPERONE 42 MG/1
42 CAPSULE ORAL DAILY
COMMUNITY

## 2023-07-16 RX ORDER — AZITHROMYCIN 250 MG/1
500 TABLET, FILM COATED ORAL ONCE
Status: COMPLETED | OUTPATIENT
Start: 2023-07-16 | End: 2023-07-16

## 2023-07-16 RX ORDER — PROMETHAZINE HYDROCHLORIDE 25 MG/1
12.5-25 SUPPOSITORY RECTAL EVERY 4 HOURS PRN
Status: DISCONTINUED | OUTPATIENT
Start: 2023-07-16 | End: 2023-07-17 | Stop reason: HOSPADM

## 2023-07-16 RX ORDER — OMEPRAZOLE 20 MG/1
20 CAPSULE, DELAYED RELEASE ORAL DAILY
Status: DISCONTINUED | OUTPATIENT
Start: 2023-07-16 | End: 2023-07-17 | Stop reason: HOSPADM

## 2023-07-16 RX ORDER — POLYETHYLENE GLYCOL 3350 17 G/17G
1 POWDER, FOR SOLUTION ORAL
Status: DISCONTINUED | OUTPATIENT
Start: 2023-07-16 | End: 2023-07-17 | Stop reason: HOSPADM

## 2023-07-16 RX ORDER — ONDANSETRON 2 MG/ML
4 INJECTION INTRAMUSCULAR; INTRAVENOUS EVERY 4 HOURS PRN
Status: DISCONTINUED | OUTPATIENT
Start: 2023-07-16 | End: 2023-07-17 | Stop reason: HOSPADM

## 2023-07-16 RX ORDER — AMOXICILLIN 250 MG
2 CAPSULE ORAL 2 TIMES DAILY PRN
Status: DISCONTINUED | OUTPATIENT
Start: 2023-07-16 | End: 2023-07-17 | Stop reason: HOSPADM

## 2023-07-16 RX ORDER — SODIUM CHLORIDE, SODIUM LACTATE, POTASSIUM CHLORIDE, CALCIUM CHLORIDE 600; 310; 30; 20 MG/100ML; MG/100ML; MG/100ML; MG/100ML
INJECTION, SOLUTION INTRAVENOUS CONTINUOUS
Status: DISCONTINUED | OUTPATIENT
Start: 2023-07-16 | End: 2023-07-17

## 2023-07-16 RX ORDER — BISACODYL 10 MG
10 SUPPOSITORY, RECTAL RECTAL
Status: DISCONTINUED | OUTPATIENT
Start: 2023-07-16 | End: 2023-07-17 | Stop reason: HOSPADM

## 2023-07-16 RX ORDER — ASPIRIN 325 MG
325 TABLET ORAL DAILY
Status: DISCONTINUED | OUTPATIENT
Start: 2023-07-16 | End: 2023-07-16

## 2023-07-16 RX ORDER — HYDRALAZINE HYDROCHLORIDE 20 MG/ML
10 INJECTION INTRAMUSCULAR; INTRAVENOUS EVERY 4 HOURS PRN
Status: DISCONTINUED | OUTPATIENT
Start: 2023-07-16 | End: 2023-07-17 | Stop reason: HOSPADM

## 2023-07-16 RX ORDER — AMOXICILLIN 250 MG
2 CAPSULE ORAL 2 TIMES DAILY
Status: DISCONTINUED | OUTPATIENT
Start: 2023-07-16 | End: 2023-07-16

## 2023-07-16 RX ORDER — ASPIRIN 300 MG/1
300 SUPPOSITORY RECTAL DAILY
Status: DISCONTINUED | OUTPATIENT
Start: 2023-07-16 | End: 2023-07-16

## 2023-07-16 RX ORDER — ASPIRIN 81 MG/1
324 TABLET, CHEWABLE ORAL DAILY
Status: DISCONTINUED | OUTPATIENT
Start: 2023-07-16 | End: 2023-07-16

## 2023-07-16 RX ORDER — ACETAMINOPHEN 325 MG/1
650 TABLET ORAL EVERY 6 HOURS PRN
Status: DISCONTINUED | OUTPATIENT
Start: 2023-07-16 | End: 2023-07-17 | Stop reason: HOSPADM

## 2023-07-16 RX ORDER — ONDANSETRON 4 MG/1
4 TABLET, ORALLY DISINTEGRATING ORAL EVERY 4 HOURS PRN
Status: DISCONTINUED | OUTPATIENT
Start: 2023-07-16 | End: 2023-07-17 | Stop reason: HOSPADM

## 2023-07-16 RX ORDER — KETOROLAC TROMETHAMINE 30 MG/ML
15 INJECTION, SOLUTION INTRAMUSCULAR; INTRAVENOUS ONCE
Status: COMPLETED | OUTPATIENT
Start: 2023-07-16 | End: 2023-07-16

## 2023-07-16 RX ORDER — MAGNESIUM SULFATE 1 G/100ML
1 INJECTION INTRAVENOUS ONCE
Status: COMPLETED | OUTPATIENT
Start: 2023-07-16 | End: 2023-07-16

## 2023-07-16 RX ORDER — DIVALPROEX SODIUM 500 MG/1
1000 TABLET, EXTENDED RELEASE ORAL 2 TIMES DAILY
Status: DISCONTINUED | OUTPATIENT
Start: 2023-07-16 | End: 2023-07-17 | Stop reason: HOSPADM

## 2023-07-16 RX ORDER — BISACODYL 10 MG
10 SUPPOSITORY, RECTAL RECTAL
Status: DISCONTINUED | OUTPATIENT
Start: 2023-07-16 | End: 2023-07-16

## 2023-07-16 RX ORDER — POLYETHYLENE GLYCOL 3350 17 G/17G
1 POWDER, FOR SOLUTION ORAL
Status: DISCONTINUED | OUTPATIENT
Start: 2023-07-16 | End: 2023-07-16

## 2023-07-16 RX ORDER — PROCHLORPERAZINE EDISYLATE 5 MG/ML
5-10 INJECTION INTRAMUSCULAR; INTRAVENOUS EVERY 4 HOURS PRN
Status: DISCONTINUED | OUTPATIENT
Start: 2023-07-16 | End: 2023-07-17 | Stop reason: HOSPADM

## 2023-07-16 RX ORDER — PROMETHAZINE HYDROCHLORIDE 25 MG/1
12.5-25 TABLET ORAL EVERY 4 HOURS PRN
Status: DISCONTINUED | OUTPATIENT
Start: 2023-07-16 | End: 2023-07-17 | Stop reason: HOSPADM

## 2023-07-16 RX ADMIN — DIVALPROEX SODIUM 1000 MG: 500 TABLET, EXTENDED RELEASE ORAL at 19:42

## 2023-07-16 RX ADMIN — NICOTINE TRANSDERMAL SYSTEM 21 MG: 21 PATCH, EXTENDED RELEASE TRANSDERMAL at 18:00

## 2023-07-16 RX ADMIN — AZITHROMYCIN DIHYDRATE 500 MG: 250 TABLET ORAL at 13:51

## 2023-07-16 RX ADMIN — KETOROLAC TROMETHAMINE 15 MG: 30 INJECTION, SOLUTION INTRAMUSCULAR; INTRAVENOUS at 17:39

## 2023-07-16 RX ADMIN — ONDANSETRON 4 MG: 2 INJECTION INTRAMUSCULAR; INTRAVENOUS at 13:51

## 2023-07-16 RX ADMIN — SODIUM CHLORIDE, POTASSIUM CHLORIDE, SODIUM LACTATE AND CALCIUM CHLORIDE: 600; 310; 30; 20 INJECTION, SOLUTION INTRAVENOUS at 17:41

## 2023-07-16 RX ADMIN — OMEPRAZOLE 20 MG: 20 CAPSULE, DELAYED RELEASE ORAL at 17:39

## 2023-07-16 RX ADMIN — MAGNESIUM SULFATE IN DEXTROSE 1 G: 10 INJECTION, SOLUTION INTRAVENOUS at 17:47

## 2023-07-16 RX ADMIN — AMPICILLIN AND SULBACTAM 3 G: 1; 2 INJECTION, POWDER, FOR SOLUTION INTRAMUSCULAR; INTRAVENOUS at 13:51

## 2023-07-16 ASSESSMENT — PATIENT HEALTH QUESTIONNAIRE - PHQ9
2. FEELING DOWN, DEPRESSED, IRRITABLE, OR HOPELESS: NOT AT ALL
SUM OF ALL RESPONSES TO PHQ9 QUESTIONS 1 AND 2: 0
2. FEELING DOWN, DEPRESSED, IRRITABLE, OR HOPELESS: NOT AT ALL
1. LITTLE INTEREST OR PLEASURE IN DOING THINGS: NOT AT ALL
1. LITTLE INTEREST OR PLEASURE IN DOING THINGS: NOT AT ALL
SUM OF ALL RESPONSES TO PHQ9 QUESTIONS 1 AND 2: 0

## 2023-07-16 ASSESSMENT — ENCOUNTER SYMPTOMS
NAUSEA: 0
MUSCULOSKELETAL NEGATIVE: 1
EYES NEGATIVE: 1
COUGH: 0
WEAKNESS: 1
SHORTNESS OF BREATH: 0
HEARTBURN: 0
PSYCHIATRIC NEGATIVE: 1
VOMITING: 0

## 2023-07-16 ASSESSMENT — LIFESTYLE VARIABLES
TOTAL SCORE: 0
HOW MANY TIMES IN THE PAST YEAR HAVE YOU HAD 5 OR MORE DRINKS IN A DAY: 0
ON A TYPICAL DAY WHEN YOU DRINK ALCOHOL HOW MANY DRINKS DO YOU HAVE: 0
EVER FELT BAD OR GUILTY ABOUT YOUR DRINKING: NO
EVER HAD A DRINK FIRST THING IN THE MORNING TO STEADY YOUR NERVES TO GET RID OF A HANGOVER: NO
TOTAL SCORE: 0
HAVE PEOPLE ANNOYED YOU BY CRITICIZING YOUR DRINKING: NO
TOTAL SCORE: 0
AVERAGE NUMBER OF DAYS PER WEEK YOU HAVE A DRINK CONTAINING ALCOHOL: 0
CONSUMPTION TOTAL: NEGATIVE
DOES PATIENT WANT TO STOP DRINKING: NO
HAVE YOU EVER FELT YOU SHOULD CUT DOWN ON YOUR DRINKING: NO
ALCOHOL_USE: NO

## 2023-07-16 ASSESSMENT — HEART SCORE
HISTORY: MODERATELY SUSPICIOUS
TROPONIN: LESS THAN OR EQUAL TO NORMAL LIMIT
AGE: 45-64
HEART SCORE: 5
ECG: NON-SPECIFIC REPOLARIZATION DISTURBANCE
RISK FACTORS: >2 RISK FACTORS OR HX OF ATHEROSCLEROTIC DISEASE

## 2023-07-16 ASSESSMENT — FIBROSIS 4 INDEX
FIB4 SCORE: 1.48
FIB4 SCORE: 1.48
FIB4 SCORE: 1.53

## 2023-07-16 ASSESSMENT — PAIN DESCRIPTION - PAIN TYPE
TYPE: ACUTE PAIN

## 2023-07-16 NOTE — ED PROVIDER NOTES
ED Provider Note    CHIEF COMPLAINT  Chief Complaint   Patient presents with    Chest Pain     9/10 substernal CP onset 1315 today, denies cardiac hx, hx HTN. HR 49 on arrival, BP 84/55 with EMS, /62 on ER arrival after 800 mL NS bolus       EXTERNAL RECORDS REVIEWED  Outpatient Notes was seen by family nurse practitioner 5/10/2022 for sore throat symptoms    HPI/ROS  LIMITATION TO HISTORY   Select: Behavior patient has a history of schizophrenia and schizoaffective disorder  OUTSIDE HISTORIAN(S):  none    Haile Chavez is a 58 y.o. male who presents by ambulance complaining of 20 minutes of chest pain prior to arrival and associated shortness of breath.  He states that the shortness of breath is resolved but he still has some right-sided pain in his chest.  He denies any cough or cold symptoms.  He has no swelling to his legs no PND no orthopnea.  He does admit to smoking but denies alcohol or drug use.  He states that he had a heart attack in 1970s but has never had a stent.  Nothing makes his pain better or worse.  He states that he seen a doctor at [facility] before.  He does not take any medications on a regular basis for diabetes or hypertension    PAST MEDICAL HISTORY   has a past medical history of Angina, Diabetes (HCC), Hepatitis C (5/24/1996), HTN (hypertension), benign (4/22/2008), Psychiatric disorder, PTSD (post-traumatic stress disorder), Schizo affective schizophrenia (HCC) (4/22/2010), Schizophrenia, schizo-affective type (HCC), and Tuberculosis (4/22/1992).    SURGICAL HISTORY   has a past surgical history that includes gastroscopy with biopsy (9/3/2010); other abdominal surgery (1982); gastroscopy with biopsy (11/7/2010); and other orthopedic surgery (2000).    FAMILY HISTORY  Family History   Problem Relation Age of Onset    Genetic Disorder Neg Hx        SOCIAL HISTORY  Social History     Tobacco Use    Smoking status: Every Day     Packs/day: 0.50     Years: 30.00     Pack years: 15.00  "    Types: Cigars, Cigarettes    Smokeless tobacco: Never    Tobacco comments:     \" 5 - 7 cigarettes a day. \"    Vaping Use    Vaping Use: Never used   Substance and Sexual Activity    Alcohol use: Not Currently    Drug use: No    Sexual activity: Yes     Partners: Female       CURRENT MEDICATIONS  Home Medications       Reviewed by Jimmy Hull R.N. (Registered Nurse) on 07/16/23 at 1316  Med List Status: Partial     Medication Last Dose Status   acetaminophen (TYLENOL) 325 MG Tab  Active   bacitracin 500 UNIT/GM ointment  Active   baclofen (LIORESAL) 5 MG Tab  Active   benztropine (COGENTIN) 1 MG Tab  Active   benztropine (COGENTIN) 2 MG Tab  Active   clotrimazole (LOTRIMIN) 1 % Cream  Active   divalproex ER (DEPAKOTE ER) 500 MG TABLET SR 24 HR  Active   INVEGA SUSTENNA 234 MG/1.5ML Suspension Prefilled Syringe  Active   metFORMIN (GLUCOPHAGE) 500 MG Tab  Active   olanzapine (ZYPREXA) 20 MG tablet  Active   omeprazole (PRILOSEC) 40 MG delayed-release capsule  Active   pantoprazole (PROTONIX) 40 MG Tablet Delayed Response  Active   phenazopyridine (PYRIDIUM) 200 MG Tab  Active   STOOL SOFTENER 100 MG Cap  Active   trihexyphenidyl (ARTANE) 2 MG Tab  Active                    ALLERGIES  Allergies   Allergen Reactions    Hctz      Hyponatremia      Nsaids     Other Drug Nausea     All anti-inflammitories,  Headache and body aches.    Vicodin [Hydrocodone-Acetaminophen] Vomiting     Headaches    Amoxicillin Nausea     Headache and body aches       PHYSICAL EXAM  VITAL SIGNS: /62   Pulse (!) 49   Temp 36.4 °C (97.6 °F) (Temporal)   Resp 18   Ht 1.88 m (6' 2\")   Wt 83.9 kg (185 lb)   SpO2 93%   BMI 23.75 kg/m²      Constitutional: Well developed, Well nourished, No acute distress, Non-toxic appearance.   HEENT: Normocephalic, Atraumatic,  external ears normal, pharynx pink,  Mucous  Membranes moist, No rhinorrhea or mucosal edema positive dental caries  Eyes: PERRL, EOMI, Conjunctiva normal, No " discharge.   Neck: Normal range of motion, No tenderness, Supple, No stridor.  No JVD  Lymphatic: No lymphadenopathy    Cardiovascular: Bradycardic regular Rate and Rhythm, No murmurs,  rubs, or gallops.   Thorax & Lungs: Lungs clear to auscultation bilaterally, No respiratory distress, No wheezes, rhales or rhonchi, No chest wall tenderness.   Abdomen: Bowel sounds normal, Soft, non tender, non distended,  No pulsatile masses., no rebound guarding or peritoneal signs.   Skin: Warm, Dry, No erythema, No rash,   Back:  No CVA tenderness,  No spinal tenderness, bony crepitance step offs or instability.   Extremities: Equal, intact distal pulses, No cyanosis, clubbing or edema,  No tenderness.   Musculoskeletal: Good range of motion in all major joints. No tenderness to palpation or major deformities noted.   Neurologic: Alert & oriented x 3, Cranial nerves II-XII intact, Equal strength and sensation upper and lower extremities bilaterally,  No focal deficits noted.   Psychiatric: Affect normal, Judgment normal, Mood normal.       DIAGNOSTIC STUDIES / PROCEDURES  EKG  I have independently interpreted this EKG  See below    LABS  Results for orders placed or performed during the hospital encounter of 07/16/23   CBC with Differential   Result Value Ref Range    WBC 3.7 (L) 4.8 - 10.8 K/uL    RBC 4.15 (L) 4.70 - 6.10 M/uL    Hemoglobin 12.6 (L) 14.0 - 18.0 g/dL    Hematocrit 37.4 (L) 42.0 - 52.0 %    MCV 90.1 81.4 - 97.8 fL    MCH 30.4 27.0 - 33.0 pg    MCHC 33.7 32.3 - 36.5 g/dL    RDW 42.6 35.9 - 50.0 fL    Platelet Count 152 (L) 164 - 446 K/uL    MPV 9.9 9.0 - 12.9 fL    Neutrophils-Polys 42.30 (L) 44.00 - 72.00 %    Lymphocytes 38.50 22.00 - 41.00 %    Monocytes 10.70 0.00 - 13.40 %    Eosinophils 7.40 (H) 0.00 - 6.90 %    Basophils 1.10 0.00 - 1.80 %    Immature Granulocytes 0.00 0.00 - 0.90 %    Nucleated RBC 0.00 0.00 - 0.20 /100 WBC    Neutrophils (Absolute) 1.55 (L) 1.82 - 7.42 K/uL    Lymphs (Absolute) 1.41 1.00  - 4.80 K/uL    Monos (Absolute) 0.39 0.00 - 0.85 K/uL    Eos (Absolute) 0.27 0.00 - 0.51 K/uL    Baso (Absolute) 0.04 0.00 - 0.12 K/uL    Immature Granulocytes (abs) 0.00 0.00 - 0.11 K/uL    NRBC (Absolute) 0.00 K/uL   Complete Metabolic Panel (CMP)   Result Value Ref Range    Sodium 136 135 - 145 mmol/L    Potassium 3.9 3.6 - 5.5 mmol/L    Chloride 103 96 - 112 mmol/L    Co2 21 20 - 33 mmol/L    Anion Gap 12.0 7.0 - 16.0    Glucose 100 (H) 65 - 99 mg/dL    Bun 8 8 - 22 mg/dL    Creatinine 1.21 0.50 - 1.40 mg/dL    Calcium 8.8 8.5 - 10.5 mg/dL    AST(SGOT) 13 12 - 45 U/L    ALT(SGPT) 12 2 - 50 U/L    Alkaline Phosphatase 56 30 - 99 U/L    Total Bilirubin 0.3 0.1 - 1.5 mg/dL    Albumin 4.1 3.2 - 4.9 g/dL    Total Protein 6.7 6.0 - 8.2 g/dL    Globulin 2.6 1.9 - 3.5 g/dL    A-G Ratio 1.6 g/dL   Troponins NOW   Result Value Ref Range    Troponin T 6 6 - 19 ng/L   TSH WITH REFLEX TO FT4   Result Value Ref Range    TSH 1.270 0.380 - 5.330 uIU/mL   CORRECTED CALCIUM   Result Value Ref Range    Correct Calcium 8.7 8.5 - 10.5 mg/dL   ESTIMATED GFR   Result Value Ref Range    GFR (CKD-EPI) 68 >60 mL/min/1.73 m 2   EKG   Result Value Ref Range    Report       [facility] Emergency Dept.    Test Date:  2023  Pt Name:    JAYMIE SINGH                Department: ER  MRN:        [id number]                      Room:        20  Gender:     Male                         Technician: [id number]  :        1965                   Requested By:ER TRIAGE PROTOCOL  Order #:    [id number]                    Reading MD: SPRING QIU MD    Measurements  Intervals                                Axis  Rate:       50                           P:          49  WA:         205                          QRS:        67  QRSD:       92                           T:          31  QT:         455  QTc:        415    Interpretive Statements  Sinus bradycardia  Borderline prolonged WA interval  Probable anteroseptal infarct,  old  Compared to ECG 06/04/2023 08:50:05  Sinus rhythm no longer present  Myocardial infarct finding still present  Electronically Signed On 07- 13:19:59 PDT by SPRING QIU MD           RADIOLOGY  I have independently interpreted the diagnostic imaging associated with this visit and am waiting the final reading from the radiologist.   My preliminary interpretation is as follows: cxr right lower lobe infiltrate  Radiologist interpretation:   DX-CHEST-PORTABLE (1 VIEW)   Final Result      1.  RIGHT lung base curvilinear density again seen, consistent with pleural calcification.   2.  No pneumonia or pneumothorax.           COURSE & MEDICAL DECISION MAKING    ED Observation Status? No; Patient does not meet criteria for ED Observation.     INITIAL ASSESSMENT, COURSE AND PLAN  Care Narrative: This is a 58-year-old male with a history of schizophrenia who comes in with chest pain onset 30 minutes prior to arrival.  He comes in by ambulance.  He complains of associated shortness of breath.  On physical exam he has dental caries his heart is bradycardic no murmurs rubs or gallops he has no extremity edema and normal neurologic exam.  I ordered labs chest x-ray EKG to further evaluate his symptoms     Differential diagnosis: Cardiac ischemia, arrhythmia, hypothyroidism, cardiomyopathy, sick sinus syndrome, sepsis  ADDITIONAL PROBLEM LIST    Schizoaffective disorder and schizophrenia  Tobacco use  Diabetes  Hypertension  PTSD  Tuberculosis in 1992  DISPOSITION AND DISCUSSIONS    The patient's troponin is 6 his chest x-ray shows a calcification in his right lower lung which is chronic and his EKG does not show ischemic changes though it does show bradycardia.  This gives him a heart score of 5.  White count is slightly low at 3.7 hemoglobin 12.6 platelet count low at 152.  He is 7.4 eosinophils and 42% neutrophils.  Comprehensive metabolic panel is unremarkable except for slight elevated glucose of 100 but his  electrolytes and kidney function and liver function tests are normal.  TSH is normal at 1.27.  Lactic acid is 1.9 blood cultures were drawn.  Urine drug screen is currently pending.  The patient is resting comfortably here and not having any pain.  Initially with EMS he had a low blood pressure of 88 systolic but here he is 131/82 it was heart rate is still low at 51.  I spoken with the hospitalist for CDU and he will be admitted for further observation and care.    I have discussed management of the patient with the following physicians and DONNY's:  hospitalist Dr Kumari will admit the patient for further work-up of his bradycardia    Discussion of management with other [facility] or appropriate source(s): None     Escalation of care considered, and ultimately not performed: none    Barriers to care at this time, including but not limited to: Patient does not have established PCP.     Decision tools and prescription drugs considered including, but not limited to: HEART Score 5 .    FINAL DIAGNOSIS  1. Chest pain, unspecified type    2. Symptomatic bradycardia           Electronically signed by: Mariah Mckee M.D., 7/16/2023 1:17 PM

## 2023-07-16 NOTE — ASSESSMENT & PLAN NOTE
-Counseled for 3 minutes regarding smoking cessation  -Nicotine patch ordered, encourage cessation

## 2023-07-16 NOTE — ED NOTES
Med rec completed per patient at bedside.  Allergies reviewed with patient.  No outpatient antibiotics within the last 30 days.  Patient's preferred pharmacy: [facility] on [address]

## 2023-07-16 NOTE — ASSESSMENT & PLAN NOTE
Describes a pain in lower right rib cage, worse on palpation and deep inspiration    Had 1 dose empiric Unasyn 3g and azithromycin 500mg PO on arrival  AM CBC  Trend troponins, EKG  Echo

## 2023-07-16 NOTE — ED NOTES
Eden SIMMONS at bedside transporting patient to T217 on tele monitoring. Chart and belongings sent with patient. Patient care transferred.
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
## 2023-07-16 NOTE — ED TRIAGE NOTES
Haile Chavez  58 y.o. male  Chief Complaint   Patient presents with    Chest Pain     9/10 substernal CP onset 1315 today, denies cardiac hx, hx HTN. HR 49 on arrival, BP 84/55 with EMS, /62 on ER arrival after 800 mL NS bolus     Pt BIB EMS for above complaint.    Pt is GCS 15, speaking in full sentences, follows commands and responds appropriately to questions. Resp are even and unlabored.     CP protocol ordered. EKG performed.      Vitals:    07/16/23 1311   BP: 105/62   Pulse: (!) 49   Resp: 18   Temp: 36.4 °C (97.6 °F)   SpO2: 93%

## 2023-07-17 ENCOUNTER — PHARMACY VISIT (OUTPATIENT)
Dept: PHARMACY | Facility: MEDICAL CENTER | Age: 58
End: 2023-07-17
Payer: COMMERCIAL

## 2023-07-17 VITALS
TEMPERATURE: 97.5 F | SYSTOLIC BLOOD PRESSURE: 172 MMHG | RESPIRATION RATE: 16 BRPM | DIASTOLIC BLOOD PRESSURE: 84 MMHG | HEART RATE: 51 BPM | WEIGHT: 170.19 LBS | HEIGHT: 74 IN | OXYGEN SATURATION: 93 % | BODY MASS INDEX: 21.84 KG/M2

## 2023-07-17 PROBLEM — R07.9 CHEST PAIN: Status: RESOLVED | Noted: 2023-07-16 | Resolved: 2023-07-17

## 2023-07-17 PROBLEM — E86.0 DEHYDRATION: Status: RESOLVED | Noted: 2023-07-16 | Resolved: 2023-07-17

## 2023-07-17 LAB
AMPHET UR QL SCN: NEGATIVE
ANION GAP SERPL CALC-SCNC: 8 MMOL/L (ref 7–16)
BARBITURATES UR QL SCN: NEGATIVE
BASOPHILS # BLD AUTO: 0.6 % (ref 0–1.8)
BASOPHILS # BLD: 0.03 K/UL (ref 0–0.12)
BENZODIAZ UR QL SCN: NEGATIVE
BUN SERPL-MCNC: 8 MG/DL (ref 8–22)
BZE UR QL SCN: NEGATIVE
CALCIUM SERPL-MCNC: 8.4 MG/DL (ref 8.5–10.5)
CANNABINOIDS UR QL SCN: POSITIVE
CHLORIDE SERPL-SCNC: 103 MMOL/L (ref 96–112)
CHOLEST SERPL-MCNC: 118 MG/DL (ref 100–199)
CO2 SERPL-SCNC: 22 MMOL/L (ref 20–33)
CREAT SERPL-MCNC: 0.89 MG/DL (ref 0.5–1.4)
EOSINOPHIL # BLD AUTO: 0.59 K/UL (ref 0–0.51)
EOSINOPHIL NFR BLD: 12.7 % (ref 0–6.9)
ERYTHROCYTE [DISTWIDTH] IN BLOOD BY AUTOMATED COUNT: 42.9 FL (ref 35.9–50)
FENTANYL UR QL: NEGATIVE
GFR SERPLBLD CREATININE-BSD FMLA CKD-EPI: 98 ML/MIN/1.73 M 2
GLUCOSE SERPL-MCNC: 120 MG/DL (ref 65–99)
HCT VFR BLD AUTO: 37 % (ref 42–52)
HDLC SERPL-MCNC: 36 MG/DL
HGB BLD-MCNC: 12.4 G/DL (ref 14–18)
IMM GRANULOCYTES # BLD AUTO: 0.01 K/UL (ref 0–0.11)
IMM GRANULOCYTES NFR BLD AUTO: 0.2 % (ref 0–0.9)
LDLC SERPL CALC-MCNC: 61 MG/DL
LYMPHOCYTES # BLD AUTO: 1.75 K/UL (ref 1–4.8)
LYMPHOCYTES NFR BLD: 37.7 % (ref 22–41)
MCH RBC QN AUTO: 30.2 PG (ref 27–33)
MCHC RBC AUTO-ENTMCNC: 33.5 G/DL (ref 32.3–36.5)
MCV RBC AUTO: 90.2 FL (ref 81.4–97.8)
METHADONE UR QL SCN: NEGATIVE
MONOCYTES # BLD AUTO: 0.4 K/UL (ref 0–0.85)
MONOCYTES NFR BLD AUTO: 8.6 % (ref 0–13.4)
NEUTROPHILS # BLD AUTO: 1.86 K/UL (ref 1.82–7.42)
NEUTROPHILS NFR BLD: 40.2 % (ref 44–72)
NRBC # BLD AUTO: 0 K/UL
NRBC BLD-RTO: 0 /100 WBC (ref 0–0.2)
OPIATES UR QL SCN: NEGATIVE
OXYCODONE UR QL SCN: NEGATIVE
PCP UR QL SCN: NEGATIVE
PLATELET # BLD AUTO: 148 K/UL (ref 164–446)
PMV BLD AUTO: 9.8 FL (ref 9–12.9)
POTASSIUM SERPL-SCNC: 4.1 MMOL/L (ref 3.6–5.5)
PROPOXYPH UR QL SCN: NEGATIVE
RBC # BLD AUTO: 4.1 M/UL (ref 4.7–6.1)
SODIUM SERPL-SCNC: 133 MMOL/L (ref 135–145)
TRIGL SERPL-MCNC: 107 MG/DL (ref 0–149)
TROPONIN T SERPL-MCNC: <6 NG/L (ref 6–19)
WBC # BLD AUTO: 4.6 K/UL (ref 4.8–10.8)

## 2023-07-17 PROCEDURE — A9270 NON-COVERED ITEM OR SERVICE: HCPCS

## 2023-07-17 PROCEDURE — 700102 HCHG RX REV CODE 250 W/ 637 OVERRIDE(OP): Performed by: HOSPITALIST

## 2023-07-17 PROCEDURE — 80307 DRUG TEST PRSMV CHEM ANLYZR: CPT

## 2023-07-17 PROCEDURE — 84484 ASSAY OF TROPONIN QUANT: CPT

## 2023-07-17 PROCEDURE — 700111 HCHG RX REV CODE 636 W/ 250 OVERRIDE (IP)

## 2023-07-17 PROCEDURE — 80048 BASIC METABOLIC PNL TOTAL CA: CPT

## 2023-07-17 PROCEDURE — RXMED WILLOW AMBULATORY MEDICATION CHARGE: Performed by: HOSPITALIST

## 2023-07-17 PROCEDURE — 700102 HCHG RX REV CODE 250 W/ 637 OVERRIDE(OP)

## 2023-07-17 PROCEDURE — 99239 HOSP IP/OBS DSCHRG MGMT >30: CPT | Performed by: HOSPITALIST

## 2023-07-17 PROCEDURE — G0378 HOSPITAL OBSERVATION PER HR: HCPCS

## 2023-07-17 PROCEDURE — 80061 LIPID PANEL: CPT

## 2023-07-17 PROCEDURE — A9270 NON-COVERED ITEM OR SERVICE: HCPCS | Performed by: HOSPITALIST

## 2023-07-17 PROCEDURE — 85025 COMPLETE CBC W/AUTO DIFF WBC: CPT

## 2023-07-17 PROCEDURE — 700105 HCHG RX REV CODE 258: Mod: JZ

## 2023-07-17 RX ORDER — LISINOPRIL 10 MG/1
10 TABLET ORAL DAILY
Qty: 30 TABLET | Refills: 3 | Status: SHIPPED | OUTPATIENT
Start: 2023-07-17

## 2023-07-17 RX ORDER — LISINOPRIL 10 MG/1
10 TABLET ORAL
Status: DISCONTINUED | OUTPATIENT
Start: 2023-07-17 | End: 2023-07-17 | Stop reason: HOSPADM

## 2023-07-17 RX ADMIN — OMEPRAZOLE 20 MG: 20 CAPSULE, DELAYED RELEASE ORAL at 05:24

## 2023-07-17 RX ADMIN — ONDANSETRON 4 MG: 4 TABLET, ORALLY DISINTEGRATING ORAL at 07:18

## 2023-07-17 RX ADMIN — LISINOPRIL 10 MG: 10 TABLET ORAL at 13:51

## 2023-07-17 RX ADMIN — SODIUM CHLORIDE, POTASSIUM CHLORIDE, SODIUM LACTATE AND CALCIUM CHLORIDE: 600; 310; 30; 20 INJECTION, SOLUTION INTRAVENOUS at 03:11

## 2023-07-17 RX ADMIN — DIVALPROEX SODIUM 1000 MG: 500 TABLET, EXTENDED RELEASE ORAL at 05:24

## 2023-07-17 RX ADMIN — NICOTINE TRANSDERMAL SYSTEM 21 MG: 21 PATCH, EXTENDED RELEASE TRANSDERMAL at 05:24

## 2023-07-17 RX ADMIN — ACETAMINOPHEN 650 MG: 325 TABLET, FILM COATED ORAL at 07:18

## 2023-07-17 ASSESSMENT — PAIN DESCRIPTION - PAIN TYPE
TYPE: ACUTE PAIN

## 2023-07-17 NOTE — H&P
Hospital Medicine History & Physical Note    Date of Service  7/16/2023    Primary Care Physician  Pcp Not In Computer    Consultants      Code Status  Full Code    Chief Complaint  Chief Complaint   Patient presents with    Chest Pain     9/10 substernal CP onset 1315 today, denies cardiac hx, hx HTN. HR 49 on arrival, BP 84/55 with EMS, /62 on ER arrival after 800 mL NS bolus       History of Presenting Illness  Haile Chavez is a 58-year-old male who presents to ED on 7/16/2023 for complaint of chest pain.    Mr. Malik has a medical history of diabetes, tobacco use (30 pack-years), hypertension, schizoaffective schizophrenia, and tuberculosis in 1992.  He lives in a group home.  Has been taking regular walks outside in the heat the last few days, developed sharp substernal chest pain approximately 30 minutes prior to arrival to ED. Substernal, sharp,intensity 9/10. No radiation, worse with inspiration    BP 84/55 with EMS, was given 800 mL NS bolus with improvement in BP to 105/62.     In ED he is afebrile, /62, room air.  He did have incidental finding of a heart rate in the 50s, no lightheadedness or shortness of breath.   He was treated empirically with 3 g Unasyn IV and 500 mg azithromycin PO in ED. Labs without leukocytosis, demonstrate slight creatinine elevation from baseline, troponin 6, TSH 1.27, lactic acid 1.9.  Chest x-ray demonstrates no acute changes.  He was admitted to hospitalist service for observation    I discussed the plan of care with patient.    Review of Systems  Review of Systems   Constitutional:  Positive for malaise/fatigue.   HENT: Negative.     Eyes: Negative.    Respiratory:  Negative for cough and shortness of breath.    Cardiovascular:  Positive for chest pain. Negative for leg swelling.   Gastrointestinal:  Negative for heartburn, nausea and vomiting.   Genitourinary: Negative.    Musculoskeletal: Negative.    Neurological:  Positive for weakness.    Psychiatric/Behavioral: Negative.     All other systems reviewed and are negative.      Past Medical History   has a past medical history of Angina, Diabetes (HCC), Hepatitis C (5/24/1996), HTN (hypertension), benign (4/22/2008), Psychiatric disorder, PTSD (post-traumatic stress disorder), Schizo affective schizophrenia (HCC) (4/22/2010), Schizophrenia, schizo-affective type (HCC), and Tuberculosis (4/22/1992).    Surgical History   has a past surgical history that includes gastroscopy with biopsy (9/3/2010); other abdominal surgery (1982); gastroscopy with biopsy (11/7/2010); and other orthopedic surgery (2000).     Family History  family history is not on file.   Family history reviewed with patient. There is no family history that is pertinent to the chief complaint.     Social History   reports that he has been smoking cigars. He has a 15.00 pack-year smoking history. He has never used smokeless tobacco. He reports that he does not currently use alcohol. He reports that he does not use drugs.    Allergies  Allergies   Allergen Reactions    Amoxicillin Nausea and Unspecified     Headache, body aches, restlessness    Hydrocodone Vomiting, Anxiety and Unspecified     Headaches    Hctz Unspecified     Hyponatremia      Nsaids Nausea       Medications  Prior to Admission Medications   Prescriptions Last Dose Informant Patient Reported? Taking?   CAPLYTA 42 MG Cap 7/16/2023 at 0700 Patient Yes No   Sig: Take 42 mg by mouth every day.   INVEGA SUSTENNA 234 MG/1.5ML Suspension Prefilled Syringe 6/26/2023 at MONTHLY Patient Yes No   Sig: Inject 234 mg into the shoulder, thigh, or buttocks every 30 (thirty) days.   divalproex ER (DEPAKOTE ER) 500 MG TABLET SR 24 HR 7/16/2023 at 0700 Patient No No   Sig: Take 2 Tabs by mouth 2 Times a Day.      Facility-Administered Medications: None       Physical Exam  Temp:  [35.6 °C (96.1 °F)-36.6 °C (97.8 °F)] 36.6 °C (97.8 °F)  Pulse:  [45-59] 51  Resp:  [12-20] 17  BP:  (103-131)/(59-82) 114/60  SpO2:  [92 %-95 %] 95 %  Blood Pressure: 114/60   Temperature: 36.6 °C (97.8 °F)   Pulse: (!) 51   Respiration: 17   Pulse Oximetry: 95 %       Physical Exam  Constitutional:       General: He is not in acute distress.     Appearance: He is not ill-appearing or toxic-appearing.   HENT:      Mouth/Throat:      Mouth: Mucous membranes are dry.   Eyes:      Extraocular Movements: Extraocular movements intact.      Pupils: Pupils are equal, round, and reactive to light.   Cardiovascular:      Rate and Rhythm: Regular rhythm. Bradycardia present.      Heart sounds: No murmur heard.  Pulmonary:      Effort: Pulmonary effort is normal. No respiratory distress.      Breath sounds: No stridor. No wheezing or rhonchi.   Abdominal:      General: Abdomen is flat. There is no distension.      Palpations: There is no mass.      Tenderness: There is no guarding or rebound.      Hernia: No hernia is present.   Musculoskeletal:         General: No swelling. Normal range of motion.      Cervical back: Normal range of motion.      Right lower leg: No edema.      Left lower leg: No edema.   Skin:     Capillary Refill: Capillary refill takes 2 to 3 seconds.      Coloration: Skin is not jaundiced or pale.      Findings: No lesion or rash.   Neurological:      General: No focal deficit present.      Mental Status: He is oriented to person, place, and time.      Motor: Weakness present.      Comments: Slow speech   Psychiatric:      Comments: Flat affect         Laboratory:  Recent Labs     07/16/23  1313   WBC 3.7*   RBC 4.15*   HEMOGLOBIN 12.6*   HEMATOCRIT 37.4*   MCV 90.1   MCH 30.4   MCHC 33.7   RDW 42.6   PLATELETCT 152*   MPV 9.9     Recent Labs     07/16/23  1313   SODIUM 136   POTASSIUM 3.9   CHLORIDE 103   CO2 21   GLUCOSE 100*   BUN 8   CREATININE 1.21   CALCIUM 8.8     Recent Labs     07/16/23  1313   ALTSGPT 12   ASTSGOT 13   ALKPHOSPHAT 56   TBILIRUBIN 0.3   GLUCOSE 100*         No results for  input(s): NTPROBNP in the last 72 hours.      Recent Labs     07/16/23  1313 07/16/23  1520   TROPONINT 6 <6       Imaging:  EC-ECHOCARDIOGRAM COMPLETE W/O CONT         DX-CHEST-PORTABLE (1 VIEW)   Final Result      1.  RIGHT lung base curvilinear density again seen, consistent with pleural calcification.   2.  No pneumonia or pneumothorax.          EKG:  I have personally reviewed the images and compared with prior images.    Sinus ivana, rate 53      Assessment/Plan:  Justification for Admission Status  I anticipate this patient is appropriate for observation status at this time because I expect patient will require less than 48 hours to treat his dehydration        * Dehydration- (present on admission)  Assessment & Plan  States he has been outside on walks and at the bus stop lately  Initial EMS reported SBP 88, was given 800mL fluid bolus by EMS. BP 130s in ED  Creatinine elevated 1.21 from 0.69 last month.  IV fluids   AM BMP    Bradycardia- (present on admission)  Assessment & Plan  likely incidental finding   Monitor on tele  TSH  Echo    Serial troponins    Chest pain- (present on admission)  Assessment & Plan  Describes a pain in lower right rib cage, worse on palpation and deep inspiration    Had 1 dose empiric Unasyn 3g and azithromycin 500mg PO on arrival  AM CBC  Trend troponins, EKG  Echo      Tobacco abuse- (present on admission)  Assessment & Plan  -Counseled for 3 minutes regarding smoking cessation  -Nicotine patch ordered, encourage cessation      Schizo affective schizophrenia (HCC)- (present on admission)  Assessment & Plan  He is pleasant and cooperative on exam.   .  Continue home meds          VTE prophylaxis: SCDs/TEDs

## 2023-07-17 NOTE — DISCHARGE INSTRUCTIONS
Nonspecific Chest Pain  Chest pain can be caused by many different conditions. Some causes of chest pain can be life-threatening. These will require treatment right away. Serious causes of chest pain include:  Heart attack.  A tear in the body's main blood vessel.  Redness and swelling (inflammation) around your heart.  Blood clot in your lungs.  Other causes of chest pain may not be so serious. These include:  Heartburn.  Anxiety or stress.  Damage to bones or muscles in your chest.  Lung infections.  Chest pain can feel like:  Pain or discomfort in your chest.  Crushing, pressure, aching, or squeezing pain.  Burning or tingling.  Dull or sharp pain that is worse when you move, cough, or take a deep breath.  Pain or discomfort that is also felt in your back, neck, jaw, shoulder, or arm, or pain that spreads to any of these areas.  It is hard to know whether your pain is caused by something that is serious or something that is not so serious. So it is important to see your doctor right away if you have chest pain.  Follow these instructions at home:  Medicines  Take over-the-counter and prescription medicines only as told by your doctor.  If you were prescribed an antibiotic medicine, take it as told by your doctor. Do not stop taking the antibiotic even if you start to feel better.  Lifestyle    Rest as told by your doctor.  Do not use any products that contain nicotine or tobacco, such as cigarettes, e-cigarettes, and chewing tobacco. If you need help quitting, ask your doctor.  Do not drink alcohol.  Make lifestyle changes as told by your doctor. These may include:  Getting regular exercise. Ask your doctor what activities are safe for you.  Eating a heart-healthy diet. A diet and nutrition specialist (dietitian) can help you to learn healthy eating options.  Staying at a healthy weight.  Treating diabetes or high blood pressure, if needed.  Lowering your stress. Activities such as yoga and relaxation techniques  can help.  General instructions  Pay attention to any changes in your symptoms. Tell your doctor about them or any new symptoms.  Avoid any activities that cause chest pain.  Keep all follow-up visits as told by your doctor. This is important. You may need more testing if your chest pain does not go away.  Contact a doctor if:  Your chest pain does not go away.  You feel depressed.  You have a fever.  Get help right away if:  Your chest pain is worse.  You have a cough that gets worse, or you cough up blood.  You have very bad (severe) pain in your belly (abdomen).  You pass out (faint).  You have either of these for no clear reason:  Sudden chest discomfort.  Sudden discomfort in your arms, back, neck, or jaw.  You have shortness of breath at any time.  You suddenly start to sweat, or your skin gets clammy.  You feel sick to your stomach (nauseous).  You throw up (vomit).  You suddenly feel lightheaded or dizzy.  You feel very weak or tired.  Your heart starts to beat fast, or it feels like it is skipping beats.  These symptoms may be an emergency. Do not wait to see if the symptoms will go away. Get medical help right away. Call your local emergency services (911 in the [place] Do not drive yourself to the hospital.  Summary  Chest pain can be caused by many different conditions. The cause may be serious and need treatment right away. If you have chest pain, see your doctor right away.  Follow your doctor's instructions for taking medicines and making lifestyle changes.  Keep all follow-up visits as told by your doctor. This includes visits for any further testing if your chest pain does not go away.  Be sure to know the signs that show that your condition has become worse. Get help right away if you have these symptoms.  This information is not intended to replace advice given to you by your health care provider. Make sure you discuss any questions you have with your health care provider.  Document Revised:  03/03/2022 Document Reviewed: 03/03/2022  Elsevier Patient Education © 2023 [employer]

## 2023-07-17 NOTE — DISCHARGE SUMMARY
Discharge Summary    CHIEF COMPLAINT ON ADMISSION  Chief Complaint   Patient presents with    Chest Pain     9/10 substernal CP onset 1315 today, denies cardiac hx, hx HTN. HR 49 on arrival, BP 84/55 with EMS, /62 on ER arrival after 800 mL NS bolus       Reason for Admission  ems     Admission Date  7/16/2023    CODE STATUS  Full Code    HPI & HOSPITAL COURSE    Haile Chavez is a 58-year-old male who presents to ED on 7/16/2023 for complaint of chest pain.    Mr. Malik has a medical history of diabetes, tobacco use (30 pack-years), hypertension, schizoaffective schizophrenia, and tuberculosis in 1992.  He lives in a group home.  Has been taking regular walks outside in the heat the last few days, developed sharp substernal chest pain approximately 30 minutes prior to arrival to ED. BP 84/55 with EMS, was given 800 mL NS bolus with improvement in BP to 105/62.     In ED he is afebrile, /62, room air.  He did have incidental finding of a heart rate in the 50s, no lightheadedness or shortness of breath. Describes the pain as sharp, worse on deep inspiration.  He was treated empirically with 3 g Unasyn IV and 500 mg azithromycin PO in ED. Labs without leukocytosis, demonstrate slight creatinine elevation from baseline, troponin 6, TSH 1.27, lactic acid 1.9.  Chest x-ray demonstrates no acute changes.  He was admitted to hospitalist service for observation.       Patient overnight did not have any evidence of any arrhythmia.  He was given hydration.  We resumed his home medications.  Patient was ambulated in the hallway without any dizziness or hypoxia.  Patient was cleared for discharge home on 7/17/2023 to follow-up with PCP for further care and management    Patient advised to avoid prolonged exposure to the hot sun    Once patient was euvolemic ,patient blood pressure was elevated and patient was initiated on low-dose lisinopril for blood pressure control      Therefore, he is discharged in good and  stable condition to home with close outpatient follow-up.    The patient recovered much more quickly than anticipated on admission.    Discharge Date  7/17    FOLLOW UP ITEMS POST DISCHARGE      DISCHARGE DIAGNOSES  Principal Problem (Resolved):    Dehydration (POA: Yes)  Active Problems:    Schizo affective schizophrenia (HCC) (POA: Yes)    Primary hypertension (POA: Yes)    Tobacco abuse (POA: Yes)    Bradycardia (POA: Yes)  Resolved Problems:    Chest pain (POA: Yes)      FOLLOW UP  No future appointments.  No follow-up provider specified.    MEDICATIONS ON DISCHARGE     Medication List        START taking these medications        Instructions   lisinopril 10 MG Tabs  Commonly known as: Prinivil   Take 1 Tablet by mouth every day.  Dose: 10 mg            CONTINUE taking these medications        Instructions   Caplyta 42 MG Caps  Generic drug: Lumateperone Tosylate   Take 42 mg by mouth every day.  Dose: 42 mg     divalproex  MG Tb24  Commonly known as: Depakote ER   Take 2 Tabs by mouth 2 Times a Day.  Dose: 1,000 mg     Invega Sustenna 234 MG/1.5ML Leonor  Generic drug: paliperidone palmitate ER   Inject 234 mg into the shoulder, thigh, or buttocks every 30 (thirty) days.  Dose: 234 mg              Allergies  Allergies   Allergen Reactions    Amoxicillin Nausea and Unspecified     Headache, body aches, restlessness    Hydrocodone Vomiting, Anxiety and Unspecified     Headaches    Hctz Unspecified     Hyponatremia      Nsaids Nausea       DIET  Orders Placed This Encounter   Procedures    Diet Order Diet: Cardiac     Standing Status:   Standing     Number of Occurrences:   1     Order Specific Question:   Diet:     Answer:   Cardiac [6]       ACTIVITY  As tolerated.  Weight bearing as tolerated    CONSULTATIONS      PROCEDURES      LABORATORY  Lab Results   Component Value Date    SODIUM 133 (L) 07/17/2023    POTASSIUM 4.1 07/17/2023    CHLORIDE 103 07/17/2023    CO2 22 07/17/2023    GLUCOSE 120 (H)  07/17/2023    BUN 8 07/17/2023    CREATININE 0.89 07/17/2023    CREATININE 0.9 09/02/2006        Lab Results   Component Value Date    WBC 4.6 (L) 07/17/2023    HEMOGLOBIN 12.4 (L) 07/17/2023    HEMATOCRIT 37.0 (L) 07/17/2023    PLATELETCT 148 (L) 07/17/2023        Total time of the discharge process exceeds 38 minutes.

## 2023-07-17 NOTE — PROGRESS NOTES
Hr sustaining 44 and drops 42. Pt asymptomatic. Informed hospitalist MD Kevin aware. No orders received

## 2023-07-17 NOTE — PROGRESS NOTES
Bedside report received 0650. POC discussed with pt; Pt c/o pain ,to ribs while coughing; Medicated per MD [facility] updated; No overnight event; Pt ; Annie with [facility] updated via phone with pt approval; all questions answered at this time.

## 2023-07-17 NOTE — CARE PLAN
Problem: Pain - Standard  Goal: Alleviation of pain or a reduction in pain to the patient’s comfort goal  Outcome: Progressing     Problem: Knowledge Deficit - Standard  Goal: Patient and family/care givers will demonstrate understanding of plan of care, disease process/condition, diagnostic tests and medications  Outcome: Progressing     Problem: Psychosocial  Goal: Patient's level of anxiety will decrease  Outcome: Progressing     Problem: Discharge Barriers/Planning  Goal: Patient's continuum of care needs are met  Outcome: Progressing  Flowsheets (Taken 7/17/2023 0031)  Continuum of Care Needs:   Assessed for discharge barriers   Communicated discharge barriers to interdisciplinary tream   Involved patient/family/support system in discharge process     Problem: Mobility  Goal: Patient's capacity to carry out activities will improve  Outcome: Progressing   The patient is Stable - Low risk of patient condition declining or worsening    Shift Goals  Clinical Goals: chest pain and cough  Patient Goals: comfort    Progress made toward(s) clinical / shift goals:  improving    Patient is not progressing towards the following goals:

## 2023-07-17 NOTE — HOSPITAL COURSE
Haile Chavez is a 58-year-old male who presents to ED on 7/16/2023 for complaint of chest pain.    Mr. Malik has a medical history of diabetes, tobacco use (30 pack-years), hypertension, schizoaffective schizophrenia, and tuberculosis in 1992.  He lives in a group home.  Has been taking regular walks outside in the heat the last few days, developed sharp substernal chest pain approximately 30 minutes prior to arrival to ED. BP 84/55 with EMS, was given 800 mL NS bolus with improvement in BP to 105/62.     In ED he is afebrile, /62, room air.  He did have incidental finding of a heart rate in the 50s, no lightheadedness or shortness of breath. Describes the pain as sharp, worse on deep inspiration.  He was treated empirically with 3 g Unasyn IV and 500 mg azithromycin PO in ED. Labs without leukocytosis, demonstrate slight creatinine elevation from baseline, troponin 6, TSH 1.27, lactic acid 1.9.  Chest x-ray demonstrates no acute changes.  He was admitted to hospitalist service for observation.

## 2023-07-17 NOTE — PROGRESS NOTES
DC instructions reviewed w/ pt , verbalized understanding. PIV dc'd, armband removed.  Meds to bed delivered.

## 2023-07-21 LAB
BACTERIA BLD CULT: NORMAL
BACTERIA BLD CULT: NORMAL
SIGNIFICANT IND 70042: NORMAL
SIGNIFICANT IND 70042: NORMAL
SITE SITE: NORMAL
SITE SITE: NORMAL
SOURCE SOURCE: NORMAL
SOURCE SOURCE: NORMAL

## 2023-08-05 ENCOUNTER — HOSPITAL ENCOUNTER (EMERGENCY)
Facility: MEDICAL CENTER | Age: 58
End: 2023-08-05
Attending: EMERGENCY MEDICINE
Payer: MEDICARE

## 2023-08-05 ENCOUNTER — APPOINTMENT (OUTPATIENT)
Dept: RADIOLOGY | Facility: MEDICAL CENTER | Age: 58
End: 2023-08-05
Attending: EMERGENCY MEDICINE
Payer: MEDICARE

## 2023-08-05 VITALS
DIASTOLIC BLOOD PRESSURE: 65 MMHG | HEART RATE: 61 BPM | TEMPERATURE: 98.2 F | RESPIRATION RATE: 18 BRPM | WEIGHT: 170 LBS | OXYGEN SATURATION: 93 % | SYSTOLIC BLOOD PRESSURE: 112 MMHG | BODY MASS INDEX: 21.83 KG/M2

## 2023-08-05 DIAGNOSIS — S09.90XA CLOSED HEAD INJURY, INITIAL ENCOUNTER: ICD-10-CM
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
## 2023-08-05 LAB
ALBUMIN SERPL BCP-MCNC: 4.1 G/DL (ref 3.2–4.9)
ALBUMIN/GLOB SERPL: 1.5 G/DL
ALP SERPL-CCNC: 63 U/L (ref 30–99)
ALT SERPL-CCNC: <5 U/L (ref 2–50)
ANION GAP SERPL CALC-SCNC: 12 MMOL/L (ref 7–16)
AST SERPL-CCNC: 14 U/L (ref 12–45)
BASOPHILS # BLD AUTO: 0.6 % (ref 0–1.8)
BASOPHILS # BLD: 0.03 K/UL (ref 0–0.12)
BILIRUB SERPL-MCNC: 0.5 MG/DL (ref 0.1–1.5)
BUN SERPL-MCNC: 7 MG/DL (ref 8–22)
CALCIUM ALBUM COR SERPL-MCNC: 9 MG/DL (ref 8.5–10.5)
CALCIUM SERPL-MCNC: 9.1 MG/DL (ref 8.5–10.5)
CHLORIDE SERPL-SCNC: 97 MMOL/L (ref 96–112)
CO2 SERPL-SCNC: 21 MMOL/L (ref 20–33)
CREAT SERPL-MCNC: 0.74 MG/DL (ref 0.5–1.4)
EOSINOPHIL # BLD AUTO: 0.49 K/UL (ref 0–0.51)
EOSINOPHIL NFR BLD: 10.3 % (ref 0–6.9)
ERYTHROCYTE [DISTWIDTH] IN BLOOD BY AUTOMATED COUNT: 43.6 FL (ref 35.9–50)
ETHANOL BLD-MCNC: 31 MG/DL
GFR SERPLBLD CREATININE-BSD FMLA CKD-EPI: 103 ML/MIN/1.73 M 2
GLOBULIN SER CALC-MCNC: 2.7 G/DL (ref 1.9–3.5)
GLUCOSE SERPL-MCNC: 82 MG/DL (ref 65–99)
HCT VFR BLD AUTO: 37.4 % (ref 42–52)
HGB BLD-MCNC: 12.9 G/DL (ref 14–18)
IMM GRANULOCYTES # BLD AUTO: 0.01 K/UL (ref 0–0.11)
IMM GRANULOCYTES NFR BLD AUTO: 0.2 % (ref 0–0.9)
LYMPHOCYTES # BLD AUTO: 1.34 K/UL (ref 1–4.8)
LYMPHOCYTES NFR BLD: 28 % (ref 22–41)
MCH RBC QN AUTO: 30.6 PG (ref 27–33)
MCHC RBC AUTO-ENTMCNC: 34.5 G/DL (ref 32.3–36.5)
MCV RBC AUTO: 88.8 FL (ref 81.4–97.8)
MONOCYTES # BLD AUTO: 0.43 K/UL (ref 0–0.85)
MONOCYTES NFR BLD AUTO: 9 % (ref 0–13.4)
NEUTROPHILS # BLD AUTO: 2.48 K/UL (ref 1.82–7.42)
NEUTROPHILS NFR BLD: 51.9 % (ref 44–72)
NRBC # BLD AUTO: 0 K/UL
NRBC BLD-RTO: 0 /100 WBC (ref 0–0.2)
PLATELET # BLD AUTO: 152 K/UL (ref 164–446)
PMV BLD AUTO: 9.8 FL (ref 9–12.9)
POTASSIUM SERPL-SCNC: 4.3 MMOL/L (ref 3.6–5.5)
PROT SERPL-MCNC: 6.8 G/DL (ref 6–8.2)
RBC # BLD AUTO: 4.21 M/UL (ref 4.7–6.1)
SODIUM SERPL-SCNC: 130 MMOL/L (ref 135–145)
WBC # BLD AUTO: 4.8 K/UL (ref 4.8–10.8)

## 2023-08-05 PROCEDURE — 82077 ASSAY SPEC XCP UR&BREATH IA: CPT

## 2023-08-05 PROCEDURE — 80053 COMPREHEN METABOLIC PANEL: CPT

## 2023-08-05 PROCEDURE — 70450 CT HEAD/BRAIN W/O DYE: CPT

## 2023-08-05 PROCEDURE — 99284 EMERGENCY DEPT VISIT MOD MDM: CPT

## 2023-08-05 PROCEDURE — 96374 THER/PROPH/DIAG INJ IV PUSH: CPT

## 2023-08-05 PROCEDURE — 85025 COMPLETE CBC W/AUTO DIFF WBC: CPT

## 2023-08-05 PROCEDURE — 700111 HCHG RX REV CODE 636 W/ 250 OVERRIDE (IP): Performed by: EMERGENCY MEDICINE

## 2023-08-05 PROCEDURE — 36415 COLL VENOUS BLD VENIPUNCTURE: CPT

## 2023-08-05 RX ORDER — NALOXONE HYDROCHLORIDE 0.4 MG/ML
0.4 INJECTION, SOLUTION INTRAMUSCULAR; INTRAVENOUS; SUBCUTANEOUS ONCE
Status: COMPLETED | OUTPATIENT
Start: 2023-08-05 | End: 2023-08-05

## 2023-08-05 RX ADMIN — NALOXONE HYDROCHLORIDE 0.4 MG: 0.4 INJECTION, SOLUTION INTRAMUSCULAR; INTRAVENOUS; SUBCUTANEOUS at 20:46

## 2023-08-05 ASSESSMENT — FIBROSIS 4 INDEX: FIB4 SCORE: 1.52

## 2023-08-06 NOTE — ED NOTES
Break RN: fall precautions continued. Pt asleep with even respirations. On o2 at 2lpm via nc continued

## 2023-08-06 NOTE — ED PROVIDER NOTES
"ED Provider Note    CHIEF COMPLAINT  Chief Complaint   Patient presents with    GLF       EXTERNAL RECORDS REVIEWED  Discharge summary 7/17/2023, dehydration, chest pain    HPI/ROS  LIMITATION TO HISTORY   Select: Altered mental status / Confusion    Haile Chavez is a 58 y.o. male who presents for evaluation of a witnessed ground-level fall.  Bystanders saw him fall, moved him to a safe location and called EMS.  The patient has history of schizophrenia, denies any drug or alcohol use, is altered on exam, seems very sleepy and is really not able to provide much meaningful history as he wakes up and quickly falls back asleep again.  Recent admission for dehydration and chest pain.    PAST MEDICAL HISTORY   has a past medical history of Angina, Diabetes (HCC), Hepatitis C (5/24/1996), HTN (hypertension), benign (4/22/2008), Psychiatric disorder, PTSD (post-traumatic stress disorder), Schizo affective schizophrenia (HCC) (4/22/2010), Schizophrenia, schizo-affective type (HCC), and Tuberculosis (4/22/1992).    SURGICAL HISTORY   has a past surgical history that includes gastroscopy with biopsy (9/3/2010); other abdominal surgery (1982); gastroscopy with biopsy (11/7/2010); and other orthopedic surgery (2000).    FAMILY HISTORY  Family History   Problem Relation Age of Onset    Genetic Disorder Neg Hx        SOCIAL HISTORY  Social History     Tobacco Use    Smoking status: Every Day     Packs/day: 0.50     Years: 30.00     Pack years: 15.00     Types: Cigars, Cigarettes    Smokeless tobacco: Never    Tobacco comments:     \" 5 - 7 cigarettes a day. \"    Vaping Use    Vaping Use: Never used   Substance and Sexual Activity    Alcohol use: Not Currently    Drug use: No    Sexual activity: Yes     Partners: Female       CURRENT MEDICATIONS  Home Medications       Reviewed by Radha Juan R.N. (Registered Nurse) on 08/05/23 at 1723  Med List Status: Partial     Medication Last Dose Status   CAPLYTA 42 MG Cap  Active "   divalproex ER (DEPAKOTE ER) 500 MG TABLET SR 24 HR  Active   INVEGA SUSTENNA 234 MG/1.5ML Suspension Prefilled Syringe  Active   lisinopril (PRINIVIL) 10 MG Tab  Active                    ALLERGIES  Allergies   Allergen Reactions    Amoxicillin Nausea and Unspecified     Headache, body aches, restlessness    Hydrocodone Vomiting, Anxiety and Unspecified     Headaches    Hctz Unspecified     Hyponatremia      Nsaids Nausea       PHYSICAL EXAM  VITAL SIGNS: /78   Pulse 62   Temp 36.7 °C (98 °F) (Temporal)   Resp 15   Wt 77.1 kg (170 lb)   SpO2 92%   BMI 21.83 kg/m²    Constitutional: Sleepy but easily arousable, quickly falls back asleep, nontoxic  HENT: Normocephalic, no obvious evidence of acute trauma.  Eyes: No scleral icterus. Normal conjunctiva   Thorax & Lungs: Normal nonlabored respirations. I appreciate no wheezing, rhonchi or rales. There is normal air movement.  Upon cardiac ascultation I appreciate a regular heart rhythm and a normal rate.   Abdomen: The abdomen is not visibly distended. Upon palpation, I find it to be without tenderness.  No mass appreciated.  Skin: The exposed portions of skin reveal no obvious rash or other abnormalities.  Extremities/Musculoskeletal: No obvious sign of acute trauma. No asymmetric calf tenderness or edema.   Neurologic: Slurred speech, moves all 4 extremities symmetrically to pressure stimulus.  He is very sleepy but does wake up to gentle stimulation, quickly falls back asleep.  There are no obvious focal neurologic deficit    DIAGNOSTIC STUDIES / PROCEDURES    LABS  Results for orders placed or performed during the hospital encounter of 08/05/23   CBC WITH DIFFERENTIAL   Result Value Ref Range    WBC 4.8 4.8 - 10.8 K/uL    RBC 4.21 (L) 4.70 - 6.10 M/uL    Hemoglobin 12.9 (L) 14.0 - 18.0 g/dL    Hematocrit 37.4 (L) 42.0 - 52.0 %    MCV 88.8 81.4 - 97.8 fL    MCH 30.6 27.0 - 33.0 pg    MCHC 34.5 32.3 - 36.5 g/dL    RDW 43.6 35.9 - 50.0 fL    Platelet Count  152 (L) 164 - 446 K/uL    MPV 9.8 9.0 - 12.9 fL    Neutrophils-Polys 51.90 44.00 - 72.00 %    Lymphocytes 28.00 22.00 - 41.00 %    Monocytes 9.00 0.00 - 13.40 %    Eosinophils 10.30 (H) 0.00 - 6.90 %    Basophils 0.60 0.00 - 1.80 %    Immature Granulocytes 0.20 0.00 - 0.90 %    Nucleated RBC 0.00 0.00 - 0.20 /100 WBC    Neutrophils (Absolute) 2.48 1.82 - 7.42 K/uL    Lymphs (Absolute) 1.34 1.00 - 4.80 K/uL    Monos (Absolute) 0.43 0.00 - 0.85 K/uL    Eos (Absolute) 0.49 0.00 - 0.51 K/uL    Baso (Absolute) 0.03 0.00 - 0.12 K/uL    Immature Granulocytes (abs) 0.01 0.00 - 0.11 K/uL    NRBC (Absolute) 0.00 K/uL   Comp Metabolic Panel   Result Value Ref Range    Sodium 130 (L) 135 - 145 mmol/L    Potassium 4.3 3.6 - 5.5 mmol/L    Chloride 97 96 - 112 mmol/L    Co2 21 20 - 33 mmol/L    Anion Gap 12.0 7.0 - 16.0    Glucose 82 65 - 99 mg/dL    Bun 7 (L) 8 - 22 mg/dL    Creatinine 0.74 0.50 - 1.40 mg/dL    Calcium 9.1 8.5 - 10.5 mg/dL    Correct Calcium 9.0 8.5 - 10.5 mg/dL    AST(SGOT) 14 12 - 45 U/L    ALT(SGPT) <5 2 - 50 U/L    Alkaline Phosphatase 63 30 - 99 U/L    Total Bilirubin 0.5 0.1 - 1.5 mg/dL    Albumin 4.1 3.2 - 4.9 g/dL    Total Protein 6.8 6.0 - 8.2 g/dL    Globulin 2.7 1.9 - 3.5 g/dL    A-G Ratio 1.5 g/dL   DIAGNOSTIC ALCOHOL   Result Value Ref Range    Diagnostic Alcohol 31.0 (H) <10.1 mg/dL   ESTIMATED GFR   Result Value Ref Range    GFR (CKD-EPI) 103 >60 mL/min/1.73 m 2        RADIOLOGY  I have independently interpreted the diagnostic imaging associated with this visit and am waiting the final reading from the radiologist.   My preliminary interpretation is as follows: No ICH  Radiologist interpretation:   CT-HEAD W/O   Final Result      No evidence of acute intracranial process.               COURSE & MEDICAL DECISION MAKING    ED Observation Status? Yes; I am placing the patient in to an observation status due to a diagnostic uncertainty as well as therapeutic intensity. Patient placed in observation  "status at 5:48 PM, 8/5/2023.     Observation plan is as follows: Work-up, reevaluation observation until clinically appropriate for discharge    Upon Reevaluation, the patient's condition has: Improved; and will be discharged.    Patient discharged from ED Observation status at 10:11 PM (Time) 8/5/2023 (Date).     INITIAL ASSESSMENT, COURSE AND PLAN  Care Narrative: 58-year-old male, history of schizophrenia, found altered with a ground-level fall, no evidence of head trauma but he does present altered.  He denies alcohol or drug use, his drug screens have all been negative in the past other than 1 time having cannabinoids.  Additionally his alcohol screens are usually negative as well.  No focal neurologic findings on exam.  Will obtain blood work, head CT, he will be reevaluated  Differential includes: ICH, intoxication, anemia, electrolyte disturbance    Head CT is negative, patient's alcohol level comes back minimally elevated, under the legal limit, otherwise blood work is unremarkable.  The patient was pretty out of it for a while, I have treated him with Narcan with no improvement.  Turns out, he is well-known by security staff here as being a difficult discharge, oftentimes being \"too sleepy\" to be discharged.  Our ER technician was able to get the patient up walking around, he requests to stay until breakfast, at this point he will be discharged home in stable condition      FINAL DIAGNOSIS  1. Fall, initial encounter    2. Closed head injury, initial encounter           Electronically signed by: Rhys Pierce M.D., 8/5/2023 6:32 PM      "
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
## 2023-08-06 NOTE — ED NOTES
Road test completed by tech. Patient ambulated with steady gait. Patient asking about when he will be getting breakfast.

## 2023-08-06 NOTE — ED NOTES
Assumed care of patient, bedside report received from IRIS Barker.  Introduced self as pt RN, POC discussed, call light in reach, Oxygen safety measures in place and RN traced the line, pt on 2 liters of oxygen at this time. Fall risk interventions in place, bed locked and in lowest position and fall risk sign on door.

## 2023-08-07 ENCOUNTER — APPOINTMENT (OUTPATIENT)
Dept: URGENT CARE | Facility: CLINIC | Age: 58
End: 2023-08-07
Payer: MEDICARE

## 2023-08-10 ENCOUNTER — HOSPITAL ENCOUNTER (EMERGENCY)
Facility: MEDICAL CENTER | Age: 58
End: 2023-08-10
Payer: MEDICARE

## 2023-08-10 ENCOUNTER — HOSPITAL ENCOUNTER (EMERGENCY)
Facility: MEDICAL CENTER | Age: 58
End: 2023-08-10
Attending: EMERGENCY MEDICINE
Payer: MEDICARE

## 2023-08-10 VITALS
SYSTOLIC BLOOD PRESSURE: 110 MMHG | DIASTOLIC BLOOD PRESSURE: 64 MMHG | TEMPERATURE: 97.9 F | WEIGHT: 177.25 LBS | OXYGEN SATURATION: 98 % | HEIGHT: 74 IN | BODY MASS INDEX: 22.75 KG/M2 | HEART RATE: 70 BPM | RESPIRATION RATE: 16 BRPM

## 2023-08-10 VITALS
HEIGHT: 74 IN | WEIGHT: 178.79 LBS | RESPIRATION RATE: 14 BRPM | DIASTOLIC BLOOD PRESSURE: 69 MMHG | SYSTOLIC BLOOD PRESSURE: 123 MMHG | HEART RATE: 74 BPM | OXYGEN SATURATION: 96 % | BODY MASS INDEX: 22.95 KG/M2 | TEMPERATURE: 96.5 F

## 2023-08-10 DIAGNOSIS — R10.84 GENERALIZED ABDOMINAL PAIN: ICD-10-CM

## 2023-08-10 LAB
ALBUMIN SERPL BCP-MCNC: 4.4 G/DL (ref 3.2–4.9)
ALBUMIN/GLOB SERPL: 1.5 G/DL
ALP SERPL-CCNC: 70 U/L (ref 30–99)
ALT SERPL-CCNC: 11 U/L (ref 2–50)
ANION GAP SERPL CALC-SCNC: 11 MMOL/L (ref 7–16)
AST SERPL-CCNC: 19 U/L (ref 12–45)
BASOPHILS # BLD AUTO: 0.8 % (ref 0–1.8)
BASOPHILS # BLD: 0.04 K/UL (ref 0–0.12)
BILIRUB SERPL-MCNC: 0.3 MG/DL (ref 0.1–1.5)
BUN SERPL-MCNC: 11 MG/DL (ref 8–22)
CALCIUM ALBUM COR SERPL-MCNC: 9.5 MG/DL (ref 8.5–10.5)
CALCIUM SERPL-MCNC: 9.8 MG/DL (ref 8.5–10.5)
CHLORIDE SERPL-SCNC: 103 MMOL/L (ref 96–112)
CO2 SERPL-SCNC: 23 MMOL/L (ref 20–33)
CREAT SERPL-MCNC: 0.85 MG/DL (ref 0.5–1.4)
EOSINOPHIL # BLD AUTO: 0.71 K/UL (ref 0–0.51)
EOSINOPHIL NFR BLD: 14.5 % (ref 0–6.9)
ERYTHROCYTE [DISTWIDTH] IN BLOOD BY AUTOMATED COUNT: 46.7 FL (ref 35.9–50)
GFR SERPLBLD CREATININE-BSD FMLA CKD-EPI: 99 ML/MIN/1.73 M 2
GLOBULIN SER CALC-MCNC: 2.9 G/DL (ref 1.9–3.5)
GLUCOSE SERPL-MCNC: 94 MG/DL (ref 65–99)
HCT VFR BLD AUTO: 38.3 % (ref 42–52)
HGB BLD-MCNC: 12.8 G/DL (ref 14–18)
IMM GRANULOCYTES # BLD AUTO: 0.01 K/UL (ref 0–0.11)
IMM GRANULOCYTES NFR BLD AUTO: 0.2 % (ref 0–0.9)
LIPASE SERPL-CCNC: 64 U/L (ref 11–82)
LYMPHOCYTES # BLD AUTO: 1.76 K/UL (ref 1–4.8)
LYMPHOCYTES NFR BLD: 36 % (ref 22–41)
MCH RBC QN AUTO: 30.8 PG (ref 27–33)
MCHC RBC AUTO-ENTMCNC: 33.4 G/DL (ref 32.3–36.5)
MCV RBC AUTO: 92.1 FL (ref 81.4–97.8)
MONOCYTES # BLD AUTO: 0.57 K/UL (ref 0–0.85)
MONOCYTES NFR BLD AUTO: 11.7 % (ref 0–13.4)
NEUTROPHILS # BLD AUTO: 1.8 K/UL (ref 1.82–7.42)
NEUTROPHILS NFR BLD: 36.8 % (ref 44–72)
NRBC # BLD AUTO: 0 K/UL
NRBC BLD-RTO: 0 /100 WBC (ref 0–0.2)
PLATELET # BLD AUTO: 188 K/UL (ref 164–446)
PMV BLD AUTO: 9.6 FL (ref 9–12.9)
POTASSIUM SERPL-SCNC: 3.7 MMOL/L (ref 3.6–5.5)
PROT SERPL-MCNC: 7.3 G/DL (ref 6–8.2)
RBC # BLD AUTO: 4.16 M/UL (ref 4.7–6.1)
SODIUM SERPL-SCNC: 137 MMOL/L (ref 135–145)
WBC # BLD AUTO: 4.9 K/UL (ref 4.8–10.8)

## 2023-08-10 PROCEDURE — 80053 COMPREHEN METABOLIC PANEL: CPT

## 2023-08-10 PROCEDURE — 83690 ASSAY OF LIPASE: CPT

## 2023-08-10 PROCEDURE — 99284 EMERGENCY DEPT VISIT MOD MDM: CPT

## 2023-08-10 PROCEDURE — 85025 COMPLETE CBC W/AUTO DIFF WBC: CPT

## 2023-08-10 PROCEDURE — 302449 STATCHG TRIAGE ONLY (STATISTIC)

## 2023-08-10 PROCEDURE — 36415 COLL VENOUS BLD VENIPUNCTURE: CPT

## 2023-08-10 ASSESSMENT — FIBROSIS 4 INDEX
FIB4 SCORE: 2.61
FIB4 SCORE: 2.61

## 2023-08-10 NOTE — ED TRIAGE NOTES
"Chief Complaint   Patient presents with    Psych Eval     Initially started to state that today he was incontinent of stool and urine.   Denies recent injuries.   He then continues to ramble on and tell me that the hospital is out of get him. Started to make racial slurs towards this RN.   Hx of schizioaffective disorder.      Denies SI/HI. Ambulatory back out to lobby.     /69   Pulse 74   Temp 35.8 °C (96.5 °F) (Temporal)   Resp 14   Ht 1.88 m (6' 2\")   Wt 81.1 kg (178 lb 12.7 oz)   SpO2 96%   BMI 22.96 kg/m²     "

## 2023-08-11 NOTE — ED NOTES
.DC home with written and verbal instructions regarding f/u, activity Verbalized understanding, ambulated out.

## 2023-08-11 NOTE — ED PROVIDER NOTES
"ED Provider Note    CHIEF COMPLAINT  Chief Complaint   Patient presents with    Abdominal Pain     \"My stomach is turning\"    Other     Wants diabetes test       EXTERNAL RECORDS REVIEWED  Inpatient Notes discharge summary 7/17/2023 for chest pain.  History hypertension.  Bradycardic on arrival.  Hypotensive on arrival but improved with fluid bolus.  History of diabetes, tobacco use, hypertension, schizoaffective MTB 1992.  Lives in a group home.  Sharp chest pain prior to this evaluation.  Afebrile.  Treated empirically with Unasyn and azithromycin.  Labs without leukocytosis.  Chest x-ray without acute changes.  Admitted to hospital service for observation.  No evidence of arrhythmia despite initial bradycardia.  Given hydration.  Resume home medications.    HPI/ROS  LIMITATION TO HISTORY   Select: Patient is a fairly poor historian  OUTSIDE HISTORIAN(S):  None    Haile Chavez is a 58 y.o. male who presents to the emergency department through triage requesting a diabetes test.  Patient states that he told he might be developing such previously.  Denies polydipsia, polyuria.  Denies nausea or vomiting.  Tolerating food and fluid.  Triage record with abdominal pain although patient denies any such symptoms at this time.  No diarrhea.  No chest pain, shortness of breath.    History of schizophrenia, compliant with medications including Invega.  Denies suicidal homicidal ideation or hallucinations.    PAST MEDICAL HISTORY   has a past medical history of Angina, Diabetes (HCC), Hepatitis C (5/24/1996), HTN (hypertension), benign (4/22/2008), Psychiatric disorder, PTSD (post-traumatic stress disorder), Schizo affective schizophrenia (HCC) (4/22/2010), Schizophrenia, schizo-affective type (HCC), and Tuberculosis (4/22/1992).    SURGICAL HISTORY   has a past surgical history that includes gastroscopy with biopsy (9/3/2010); other abdominal surgery (1982); gastroscopy with biopsy (11/7/2010); and other orthopedic " "surgery (2000).    FAMILY HISTORY  Family History   Problem Relation Age of Onset    Genetic Disorder Neg Hx        SOCIAL HISTORY  Social History     Tobacco Use    Smoking status: Every Day     Packs/day: 0.50     Years: 30.00     Pack years: 15.00     Types: Cigars, Cigarettes    Smokeless tobacco: Never    Tobacco comments:     \" 5 - 7 cigarettes a day. \"    Vaping Use    Vaping Use: Never used   Substance and Sexual Activity    Alcohol use: Not Currently    Drug use: No    Sexual activity: Yes     Partners: Female       CURRENT MEDICATIONS  Home Medications    **Home medications have not yet been reviewed for this encounter**         ALLERGIES  Allergies   Allergen Reactions    Amoxicillin Nausea and Unspecified     Headache, body aches, restlessness    Hydrocodone Vomiting, Anxiety and Unspecified     Headaches    Hctz Unspecified     Hyponatremia      Nsaids Nausea       PHYSICAL EXAM  VITAL SIGNS: BP 97/66   Pulse 72   Temp 36.1 °C (96.9 °F) (Temporal)   Resp 18   Ht 1.88 m (6' 2\")   Wt 80.4 kg (177 lb 4 oz)   SpO2 94%   BMI 22.76 kg/m²    Pulse ox interpretation: I interpret this pulse ox as normal.  Constitutional: Alert in no apparent distress.  HENT: Normocephalic, atraumatic. Bilateral external ears normal, Nose normal. Moist mucous membranes.    Eyes: Pupils are equal and reactive, Conjunctiva normal.   Neck: Normal range of motion, Supple  Lymphatic: No lymphadenopathy noted.   Cardiovascular: Regular rate and rhythm, no murmurs. Distal pulses intact.  No peripheral edema.  Thorax & Lungs: Normal breath sounds.  No wheezing/rales/ronchi. No increased work of breathing, clipped speech or retractions.   Abdomen: Soft, non-distended, non-tender to palpation. No palpable or pulsatile masses.   Skin: Warm, Dry, No erythema, No rash.   Musculoskeletal: Good range of motion in all major joints.  Neurologic: Alert and oriented x 3.  Moves 4 extremities spontaneously  Psychiatric: Flat " affect    DIAGNOSTIC STUDIES / PROCEDURES    LABS  Results for orders placed or performed during the hospital encounter of 08/10/23   CBC WITH DIFFERENTIAL   Result Value Ref Range    WBC 4.9 4.8 - 10.8 K/uL    RBC 4.16 (L) 4.70 - 6.10 M/uL    Hemoglobin 12.8 (L) 14.0 - 18.0 g/dL    Hematocrit 38.3 (L) 42.0 - 52.0 %    MCV 92.1 81.4 - 97.8 fL    MCH 30.8 27.0 - 33.0 pg    MCHC 33.4 32.3 - 36.5 g/dL    RDW 46.7 35.9 - 50.0 fL    Platelet Count 188 164 - 446 K/uL    MPV 9.6 9.0 - 12.9 fL    Neutrophils-Polys 36.80 (L) 44.00 - 72.00 %    Lymphocytes 36.00 22.00 - 41.00 %    Monocytes 11.70 0.00 - 13.40 %    Eosinophils 14.50 (H) 0.00 - 6.90 %    Basophils 0.80 0.00 - 1.80 %    Immature Granulocytes 0.20 0.00 - 0.90 %    Nucleated RBC 0.00 0.00 - 0.20 /100 WBC    Neutrophils (Absolute) 1.80 (L) 1.82 - 7.42 K/uL    Lymphs (Absolute) 1.76 1.00 - 4.80 K/uL    Monos (Absolute) 0.57 0.00 - 0.85 K/uL    Eos (Absolute) 0.71 (H) 0.00 - 0.51 K/uL    Baso (Absolute) 0.04 0.00 - 0.12 K/uL    Immature Granulocytes (abs) 0.01 0.00 - 0.11 K/uL    NRBC (Absolute) 0.00 K/uL   COMP METABOLIC PANEL   Result Value Ref Range    Sodium 137 135 - 145 mmol/L    Potassium 3.7 3.6 - 5.5 mmol/L    Chloride 103 96 - 112 mmol/L    Co2 23 20 - 33 mmol/L    Anion Gap 11.0 7.0 - 16.0    Glucose 94 65 - 99 mg/dL    Bun 11 8 - 22 mg/dL    Creatinine 0.85 0.50 - 1.40 mg/dL    Calcium 9.8 8.5 - 10.5 mg/dL    Correct Calcium 9.5 8.5 - 10.5 mg/dL    AST(SGOT) 19 12 - 45 U/L    ALT(SGPT) 11 2 - 50 U/L    Alkaline Phosphatase 70 30 - 99 U/L    Total Bilirubin 0.3 0.1 - 1.5 mg/dL    Albumin 4.4 3.2 - 4.9 g/dL    Total Protein 7.3 6.0 - 8.2 g/dL    Globulin 2.9 1.9 - 3.5 g/dL    A-G Ratio 1.5 g/dL   LIPASE   Result Value Ref Range    Lipase 64 11 - 82 U/L   ESTIMATED GFR   Result Value Ref Range    GFR (CKD-EPI) 99 >60 mL/min/1.73 m 2       COURSE & MEDICAL DECISION MAKING    ED Observation Status? No; Patient does not meet criteria for ED Observation.      INITIAL ASSESSMENT, COURSE AND PLAN  Care Narrative:   Patient seen evaluated bedside.  Labs per protocol prior to evaluation due to history of abdominal pain are unremarkable.  Glucose is normal.  No electrolyte derangement.  Normal LFTs and lipase.  No leukocytosis, left shift or bandemia.  Patient denies abdominal pain at this time.  Abdominal exam is benign.    Reassurance provided, encouraged to continue home occasions and follow-up with primary care at [facility].  Tolerates food in the emergency department without difficulty.    ADDITIONAL PROBLEM LIST  Schizophrenia    DISPOSITION AND DISCUSSIONS      Escalation of care considered, and ultimately not performed:acute inpatient care management, however at this time, the patient is most appropriate for outpatient management    The patient is stable for discharge home, anticipatory guidance provided, continue home medications as previously indicated, close follow-up is encouraged and strict return instructions have been discussed. Patient is agreeable to the disposition and plan.      FINAL DIAGNOSIS  1. Generalized abdominal pain           Electronically signed by: Liliana Lopez D.O., 8/10/2023 10:53 PM

## 2023-08-11 NOTE — DISCHARGE INSTRUCTIONS
Return to the emergency department in 24 hours for any persistent abdominal pain.  Return to the emergency department immediately for worsening abdominal pain, vomiting, bloody stools, fever or other new concerns.    Otherwise, follow-up with primary care, [facility], next week for reevaluation, medication management.    Continue home medications as previously indicated.    Diet as tolerated

## 2023-08-11 NOTE — ED TRIAGE NOTES
"Chief Complaint   Patient presents with    Abdominal Pain     \"My stomach is turning\"    Other     Wants diabetes test     Pt ambulated to triage with above complaints. Pt said that his Invega shots not due until 8/24 and wants to get tested for diabetes since he is having abdominal pain described as \"turning\".  Noted pt talking to himself while in triage.   Denies si/hi  "

## 2023-09-10 ENCOUNTER — APPOINTMENT (OUTPATIENT)
Dept: RADIOLOGY | Facility: MEDICAL CENTER | Age: 58
End: 2023-09-10
Attending: EMERGENCY MEDICINE
Payer: MEDICARE

## 2023-09-10 ENCOUNTER — HOSPITAL ENCOUNTER (EMERGENCY)
Facility: MEDICAL CENTER | Age: 58
End: 2023-09-10
Attending: EMERGENCY MEDICINE
Payer: MEDICARE

## 2023-09-10 VITALS
HEART RATE: 64 BPM | BODY MASS INDEX: 27.83 KG/M2 | TEMPERATURE: 97.8 F | WEIGHT: 210 LBS | RESPIRATION RATE: 17 BRPM | OXYGEN SATURATION: 97 % | HEIGHT: 73 IN | SYSTOLIC BLOOD PRESSURE: 130 MMHG | DIASTOLIC BLOOD PRESSURE: 82 MMHG

## 2023-09-10 DIAGNOSIS — S00.83XA CONTUSION OF FACE, INITIAL ENCOUNTER: ICD-10-CM

## 2023-09-10 PROCEDURE — 99284 EMERGENCY DEPT VISIT MOD MDM: CPT

## 2023-09-10 PROCEDURE — A9270 NON-COVERED ITEM OR SERVICE: HCPCS | Performed by: EMERGENCY MEDICINE

## 2023-09-10 PROCEDURE — 700102 HCHG RX REV CODE 250 W/ 637 OVERRIDE(OP): Performed by: EMERGENCY MEDICINE

## 2023-09-10 PROCEDURE — 70355 PANORAMIC X-RAY OF JAWS: CPT

## 2023-09-10 RX ORDER — ACETAMINOPHEN 325 MG/1
975 TABLET ORAL ONCE
Status: COMPLETED | OUTPATIENT
Start: 2023-09-10 | End: 2023-09-10

## 2023-09-10 RX ADMIN — ACETAMINOPHEN 975 MG: 325 TABLET, FILM COATED ORAL at 20:34

## 2023-09-10 ASSESSMENT — FIBROSIS 4 INDEX: FIB4 SCORE: 1.83

## 2023-09-10 ASSESSMENT — PAIN DESCRIPTION - PAIN TYPE: TYPE: ACUTE PAIN
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
## 2023-09-11 NOTE — DISCHARGE INSTRUCTIONS
Put ice on it every day for 1 hour.  Take all as needed for pain  Come back if not better in 1 to 2 days
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
## 2023-09-11 NOTE — ED NOTES
Pt provided discharge instruction. Pt verbalized understanding of all instructions. Pt ambulatory to lobby w/ steady gait.

## 2023-09-27 ENCOUNTER — OFFICE VISIT (OUTPATIENT)
Dept: URGENT CARE | Facility: CLINIC | Age: 58
End: 2023-09-27
Payer: MEDICARE

## 2023-09-27 ENCOUNTER — HOSPITAL ENCOUNTER (EMERGENCY)
Facility: MEDICAL CENTER | Age: 58
End: 2023-09-27
Attending: EMERGENCY MEDICINE
Payer: MEDICARE

## 2023-09-27 VITALS
HEIGHT: 74 IN | TEMPERATURE: 96.9 F | OXYGEN SATURATION: 99 % | BODY MASS INDEX: 23.74 KG/M2 | SYSTOLIC BLOOD PRESSURE: 112 MMHG | HEART RATE: 90 BPM | DIASTOLIC BLOOD PRESSURE: 60 MMHG | RESPIRATION RATE: 16 BRPM | WEIGHT: 185 LBS

## 2023-09-27 VITALS
HEART RATE: 61 BPM | WEIGHT: 184.97 LBS | SYSTOLIC BLOOD PRESSURE: 158 MMHG | DIASTOLIC BLOOD PRESSURE: 94 MMHG | RESPIRATION RATE: 16 BRPM | BODY MASS INDEX: 23.74 KG/M2 | OXYGEN SATURATION: 98 % | HEIGHT: 74 IN | TEMPERATURE: 97.5 F

## 2023-09-27 DIAGNOSIS — K04.7 DENTAL INFECTION: ICD-10-CM

## 2023-09-27 DIAGNOSIS — G89.29 CHRONIC NONINTRACTABLE HEADACHE, UNSPECIFIED HEADACHE TYPE: ICD-10-CM

## 2023-09-27 DIAGNOSIS — I10 HYPERTENSION, UNSPECIFIED TYPE: ICD-10-CM

## 2023-09-27 DIAGNOSIS — R51.9 CHRONIC NONINTRACTABLE HEADACHE, UNSPECIFIED HEADACHE TYPE: ICD-10-CM

## 2023-09-27 LAB
ANION GAP SERPL CALC-SCNC: 10 MMOL/L (ref 7–16)
BASOPHILS # BLD AUTO: 1.1 % (ref 0–1.8)
BASOPHILS # BLD: 0.05 K/UL (ref 0–0.12)
BUN SERPL-MCNC: 6 MG/DL (ref 8–22)
CALCIUM SERPL-MCNC: 9.4 MG/DL (ref 8.5–10.5)
CHLORIDE SERPL-SCNC: 100 MMOL/L (ref 96–112)
CO2 SERPL-SCNC: 25 MMOL/L (ref 20–33)
CREAT SERPL-MCNC: 0.9 MG/DL (ref 0.5–1.4)
EOSINOPHIL # BLD AUTO: 0.28 K/UL (ref 0–0.51)
EOSINOPHIL NFR BLD: 6.1 % (ref 0–6.9)
ERYTHROCYTE [DISTWIDTH] IN BLOOD BY AUTOMATED COUNT: 43.8 FL (ref 35.9–50)
GFR SERPLBLD CREATININE-BSD FMLA CKD-EPI: 97 ML/MIN/1.73 M 2
GLUCOSE SERPL-MCNC: 94 MG/DL (ref 65–99)
HCT VFR BLD AUTO: 42.8 % (ref 42–52)
HGB BLD-MCNC: 14.3 G/DL (ref 14–18)
IMM GRANULOCYTES # BLD AUTO: 0.01 K/UL (ref 0–0.11)
IMM GRANULOCYTES NFR BLD AUTO: 0.2 % (ref 0–0.9)
LYMPHOCYTES # BLD AUTO: 1.61 K/UL (ref 1–4.8)
LYMPHOCYTES NFR BLD: 34.9 % (ref 22–41)
MCH RBC QN AUTO: 29.9 PG (ref 27–33)
MCHC RBC AUTO-ENTMCNC: 33.4 G/DL (ref 32.3–36.5)
MCV RBC AUTO: 89.4 FL (ref 81.4–97.8)
MONOCYTES # BLD AUTO: 0.38 K/UL (ref 0–0.85)
MONOCYTES NFR BLD AUTO: 8.2 % (ref 0–13.4)
NEUTROPHILS # BLD AUTO: 2.28 K/UL (ref 1.82–7.42)
NEUTROPHILS NFR BLD: 49.5 % (ref 44–72)
NRBC # BLD AUTO: 0 K/UL
NRBC BLD-RTO: 0 /100 WBC (ref 0–0.2)
PLATELET # BLD AUTO: 211 K/UL (ref 164–446)
PMV BLD AUTO: 9.2 FL (ref 9–12.9)
POTASSIUM SERPL-SCNC: 4.2 MMOL/L (ref 3.6–5.5)
RBC # BLD AUTO: 4.79 M/UL (ref 4.7–6.1)
SODIUM SERPL-SCNC: 135 MMOL/L (ref 135–145)
WBC # BLD AUTO: 4.6 K/UL (ref 4.8–10.8)

## 2023-09-27 PROCEDURE — 80048 BASIC METABOLIC PNL TOTAL CA: CPT

## 2023-09-27 PROCEDURE — 3074F SYST BP LT 130 MM HG: CPT | Performed by: FAMILY MEDICINE

## 2023-09-27 PROCEDURE — 3078F DIAST BP <80 MM HG: CPT | Performed by: FAMILY MEDICINE

## 2023-09-27 PROCEDURE — 96372 THER/PROPH/DIAG INJ SC/IM: CPT

## 2023-09-27 PROCEDURE — 36415 COLL VENOUS BLD VENIPUNCTURE: CPT

## 2023-09-27 PROCEDURE — 85025 COMPLETE CBC W/AUTO DIFF WBC: CPT

## 2023-09-27 PROCEDURE — 700111 HCHG RX REV CODE 636 W/ 250 OVERRIDE (IP): Performed by: EMERGENCY MEDICINE

## 2023-09-27 PROCEDURE — 99284 EMERGENCY DEPT VISIT MOD MDM: CPT

## 2023-09-27 PROCEDURE — 99213 OFFICE O/P EST LOW 20 MIN: CPT | Performed by: FAMILY MEDICINE

## 2023-09-27 RX ORDER — ACETAMINOPHEN 500 MG
1000 TABLET ORAL ONCE
Status: COMPLETED | OUTPATIENT
Start: 2023-09-27 | End: 2023-09-27

## 2023-09-27 RX ORDER — KETOROLAC TROMETHAMINE 30 MG/ML
15 INJECTION, SOLUTION INTRAMUSCULAR; INTRAVENOUS ONCE
Status: COMPLETED | OUTPATIENT
Start: 2023-09-27 | End: 2023-09-27

## 2023-09-27 RX ORDER — ONDANSETRON 4 MG/1
4 TABLET, ORALLY DISINTEGRATING ORAL ONCE
Status: COMPLETED | OUTPATIENT
Start: 2023-09-27 | End: 2023-09-27

## 2023-09-27 RX ORDER — ONDANSETRON 2 MG/ML
4 INJECTION INTRAMUSCULAR; INTRAVENOUS ONCE
Status: DISCONTINUED | OUTPATIENT
Start: 2023-09-27 | End: 2023-09-27 | Stop reason: HOSPADM

## 2023-09-27 RX ORDER — KETOROLAC TROMETHAMINE 30 MG/ML
15 INJECTION, SOLUTION INTRAMUSCULAR; INTRAVENOUS ONCE
Status: DISCONTINUED | OUTPATIENT
Start: 2023-09-27 | End: 2023-09-27

## 2023-09-27 RX ORDER — CLINDAMYCIN HYDROCHLORIDE 300 MG/1
300 CAPSULE ORAL 3 TIMES DAILY
Qty: 15 CAPSULE | Refills: 0 | Status: SHIPPED | OUTPATIENT
Start: 2023-09-27 | End: 2023-10-02

## 2023-09-27 RX ADMIN — KETOROLAC TROMETHAMINE 15 MG: 30 INJECTION, SOLUTION INTRAMUSCULAR; INTRAVENOUS at 10:36

## 2023-09-27 RX ADMIN — Medication 1000 MG: at 08:30

## 2023-09-27 RX ADMIN — ONDANSETRON 4 MG: 4 TABLET, ORALLY DISINTEGRATING ORAL at 10:39

## 2023-09-27 ASSESSMENT — FIBROSIS 4 INDEX
FIB4 SCORE: 1.83
FIB4 SCORE: 1.83

## 2023-09-27 ASSESSMENT — PAIN DESCRIPTION - PAIN TYPE: TYPE: ACUTE PAIN

## 2023-09-27 NOTE — ED NOTES
Patient ambulated to [facility] without incident. Primary assessment complete. Patient oriented to care area. Chart up for ERP.

## 2023-09-27 NOTE — DISCHARGE INSTRUCTIONS
You were seen in the ER for chronic headache.  Thankfully your labs are reassuring.  You were seen by our  who provided you with outpatient resources should you wish to choose or find a different living situation.  It would be very important that you follow-up with a primary care physician who can help you manage your medications and ensure that you have all of your prescriptions as appropriate.  You also had high blood pressure today and this should be followed up with your PCP as well. We did give you a dose of Toradol and Zofran here in the ER.  You are safe for discharge.  Return with new or worsening symptoms.  I hope you feel better soon!

## 2023-09-27 NOTE — PROGRESS NOTES
"  Subjective:      58 y.o. male presents to urgent care for front lower teeth pain that started last night.  There is no inciting event or trauma at that time.  Pain is constant, described as throbbing, currently rated 7/10.  He has not yet tried anything for it.  He remains afebrile.  Last saw dentist several years ago.    He denies any other questions or concerns at this time.    Current problem list, medication, and past medical/surgical history were reviewed in Epic.    ROS  See HPI     Objective:      /60   Pulse 90   Temp 36.1 °C (96.9 °F)   Resp 16   Ht 1.88 m (6' 2\")   Wt 83.9 kg (185 lb)   SpO2 99%   BMI 23.75 kg/m²     Physical Exam  Constitutional:       General: He is not in acute distress.     Appearance: He is not diaphoretic.   HENT:      Mouth/Throat:      Comments: Multiple broken and missing teeth to both upper and lower front.  Lower front gums have surrounding erythema and edema  Cardiovascular:      Rate and Rhythm: Normal rate and regular rhythm.      Heart sounds: Normal heart sounds.   Pulmonary:      Effort: Pulmonary effort is normal. No respiratory distress.      Breath sounds: Normal breath sounds.   Neurological:      Mental Status: He is alert.   Psychiatric:         Mood and Affect: Affect normal.         Judgment: Judgment normal.       Assessment/Plan:     1. Dental infection  Tylenol given in urgent care for pain relief.  He is penicillin allergic, therefore prescription for clindamycin has been sent.  He was encouraged to follow-up with a dentist as soon as possible.  - acetaminophen (Tylenol) tablet 1,000 mg  - clindamycin (CLEOCIN) 300 MG Cap; Take 1 Capsule by mouth 3 times a day for 5 days.  Dispense: 15 Capsule; Refill: 0      Instructed to return to Urgent Care or nearest Emergency Department if symptoms fail to improve, for any change in condition, further concerns, or new concerning symptoms. Patient states understanding of the plan of care and discharge " instructions.    Renée Arambula M.D.

## 2023-09-27 NOTE — ED PROVIDER NOTES
ED Provider Note    CHIEF COMPLAINT  Chief Complaint   Patient presents with    Headache    Nausea       EXTERNAL RECORDS REVIEWED  Outpatient Notes seen in urgent care earlier today for dental pain in the front maxillary region that started last night.  He was given a dose of Tylenol.  A prescription for clindamycin was sent on his behalf.  He was encouraged to follow-up with dentistry.    HPI/ROS  LIMITATION TO HISTORY   Select: None  OUTSIDE HISTORIAN(S):  None    Haile Chavez is a 58 y.o. male who presents with a chief complaint of headache that has been ongoing and is unchanged in character or severity for the past 1 year.  He notes that he has been prescribed a medication (he is uncertain the name of the medicine) and is currently staying at [facility] but they have not been providing him with the medicine.  He does not think that he is getting the care that he deserves.  Nursing staff noted in the chief complaint that he reported nausea with the patient denies any nausea with me, fevers or chills, chest pain or shortness of breath, abdominal pain, weakness or numbness in his extremities.  He was seen earlier in urgent care for dental pain and is frustrated because they gave him only Tylenol.    PAST MEDICAL HISTORY   has a past medical history of Angina, Diabetes (HCC), Hepatitis C (5/24/1996), HTN (hypertension), benign (4/22/2008), Psychiatric disorder, PTSD (post-traumatic stress disorder), Schizo affective schizophrenia (HCC) (4/22/2010), Schizophrenia, schizo-affective type (HCC), and Tuberculosis (4/22/1992).    SURGICAL HISTORY   has a past surgical history that includes gastroscopy with biopsy (9/3/2010); other abdominal surgery (1982); gastroscopy with biopsy (11/7/2010); and other orthopedic surgery (2000).    FAMILY HISTORY  Family History   Problem Relation Age of Onset    Genetic Disorder Neg Hx        SOCIAL HISTORY  Social History     Tobacco Use    Smoking status: Every Day     Current  "packs/day: 0.50     Average packs/day: 0.5 packs/day for 30.0 years (15.0 ttl pk-yrs)     Types: Cigars, Cigarettes    Smokeless tobacco: Never    Tobacco comments:     \" 5 - 7 cigarettes a day. \"    Vaping Use    Vaping Use: Never used   Substance and Sexual Activity    Alcohol use: Not Currently    Drug use: No    Sexual activity: Yes     Partners: Female       CURRENT MEDICATIONS  Home Medications       Reviewed by Krista Barahona R.N. (Registered Nurse) on 09/27/23 at 0845  Med List Status: Partial     Medication Last Dose Status   CAPLYTA 42 MG Cap  Active   clindamycin (CLEOCIN) 300 MG Cap  Active   divalproex ER (DEPAKOTE ER) 500 MG TABLET SR 24 HR  Active   INVEGA SUSTENNA 234 MG/1.5ML Suspension Prefilled Syringe  Active   lisinopril (PRINIVIL) 10 MG Tab  Active                    ALLERGIES  Allergies   Allergen Reactions    Amoxicillin Nausea and Unspecified     Headache, body aches, restlessness    Hydrocodone Vomiting, Anxiety and Unspecified     Headaches    Hctz Unspecified     Hyponatremia      Nsaids Nausea       PHYSICAL EXAM  VITAL SIGNS: BP (!) 149/97   Pulse 92   Temp 36.9 °C (98.4 °F) (Temporal)   Resp 18   Ht 1.88 m (6' 2\")   Wt 83.9 kg (184 lb 15.5 oz)   SpO2 98%   BMI 23.75 kg/m²    Physical Exam  Vitals and nursing note reviewed.   Constitutional:       Appearance: He is well-developed.   HENT:      Head: Normocephalic and atraumatic.      Mouth/Throat:      Pharynx: Oropharynx is clear.      Comments: Poor dentition throughout.  Dry mucous membranes.  Eyes:      Extraocular Movements: Extraocular movements intact.      Pupils: Pupils are equal, round, and reactive to light.   Cardiovascular:      Rate and Rhythm: Normal rate and regular rhythm.   Pulmonary:      Effort: Pulmonary effort is normal.      Breath sounds: Normal breath sounds.   Abdominal:      Palpations: Abdomen is soft.      Tenderness: There is no abdominal tenderness.   Musculoskeletal:         General: Normal " range of motion.      Cervical back: Neck supple. No rigidity.   Skin:     General: Skin is warm and dry.   Neurological:      Mental Status: He is alert and oriented to person, place, and time.   Psychiatric:         Mood and Affect: Mood is anxious.         Behavior: Behavior normal.       DIAGNOSTIC STUDIES / PROCEDURES  LABS  Results for orders placed or performed during the hospital encounter of 09/27/23   CBC WITH DIFFERENTIAL   Result Value Ref Range    WBC 4.6 (L) 4.8 - 10.8 K/uL    RBC 4.79 4.70 - 6.10 M/uL    Hemoglobin 14.3 14.0 - 18.0 g/dL    Hematocrit 42.8 42.0 - 52.0 %    MCV 89.4 81.4 - 97.8 fL    MCH 29.9 27.0 - 33.0 pg    MCHC 33.4 32.3 - 36.5 g/dL    RDW 43.8 35.9 - 50.0 fL    Platelet Count 211 164 - 446 K/uL    MPV 9.2 9.0 - 12.9 fL    Neutrophils-Polys 49.50 44.00 - 72.00 %    Lymphocytes 34.90 22.00 - 41.00 %    Monocytes 8.20 0.00 - 13.40 %    Eosinophils 6.10 0.00 - 6.90 %    Basophils 1.10 0.00 - 1.80 %    Immature Granulocytes 0.20 0.00 - 0.90 %    Nucleated RBC 0.00 0.00 - 0.20 /100 WBC    Neutrophils (Absolute) 2.28 1.82 - 7.42 K/uL    Lymphs (Absolute) 1.61 1.00 - 4.80 K/uL    Monos (Absolute) 0.38 0.00 - 0.85 K/uL    Eos (Absolute) 0.28 0.00 - 0.51 K/uL    Baso (Absolute) 0.05 0.00 - 0.12 K/uL    Immature Granulocytes (abs) 0.01 0.00 - 0.11 K/uL    NRBC (Absolute) 0.00 K/uL   Basic Metabolic Panel   Result Value Ref Range    Sodium 135 135 - 145 mmol/L    Potassium 4.2 3.6 - 5.5 mmol/L    Chloride 100 96 - 112 mmol/L    Co2 25 20 - 33 mmol/L    Glucose 94 65 - 99 mg/dL    Bun 6 (L) 8 - 22 mg/dL    Creatinine 0.90 0.50 - 1.40 mg/dL    Calcium 9.4 8.5 - 10.5 mg/dL    Anion Gap 10.0 7.0 - 16.0   ESTIMATED GFR   Result Value Ref Range    GFR (CKD-EPI) 97 >60 mL/min/1.73 m 2     RADIOLOGY  Radiologist interpretation:   No orders to display     COURSE & MEDICAL DECISION MAKING    ED Observation Status? No; Patient does not meet criteria for ED Observation.     INITIAL ASSESSMENT, COURSE AND  PLAN  Care Narrative: This is a 58-year-old male who is here with chronic headache and concerns that he is not getting appropriate management at his current living situation and [facility] group home.  His headache is unchanged for the past year and he denies any worsening of character or severity.  Although nursing staff documented nausea the patient denies nausea to me.  He has no new symptoms and denies recent trauma to the head.  He denies any vision changes, difficulty speaking or swallowing, and has a normal and nonfocal neurologic exam.  His affect is somewhat odd but he does have a history of schizophrenia and this is consistent with our interaction.  He is slightly hypertensive but afebrile with otherwise normal vital signs and overall very well-appearing.  Some basic labs were obtained which were reassuring with a very mild leukopenia which is just a tiny amount lower than prior values.  He otherwise has normal electrolytes and renal function.  He was given a dose of IM Toradol and oral Zofran given his initial reports to triage nursing staff of nausea.  He is tolerating p.o. without difficulty.  I had  provide him with outpatient resources.  I do not feel that additional imaging of his brain is warranted today given that he denies any change in his chronic discomfort and has not developed any new symptoms.  I do think that close outpatient follow-up is indicated and I placed a referral to primary care on his behalf.  I have provided him with a list of local clinics where he can establish.  He is discharged in good and stable condition with strict return precautions.    HTN/IDDM FOLLOW UP:  The patient is referred to a primary physician for blood pressure management, diabetic screening, and for all other preventive health concerns    ADDITIONAL PROBLEM LIST  None  DISPOSITION AND DISCUSSIONS  I have discussed management of the patient with the following physicians and DONNY's: None    Discussion  of management with other [facility] or appropriate source(s): Social Work assisted by providing outpatient resources.      Escalation of care considered, and ultimately not performed:IV fluids and diagnostic imaging    Barriers to care at this time, including but not limited to: Patient does not have established PCP.     Decision tools and prescription drugs considered including, but not limited to:  None .    FINAL DIAGNOSIS  1. Chronic nonintractable headache, unspecified headache type    2. Hypertension, unspecified type      Electronically signed by: Mickey Pang M.D., 9/27/2023 9:04 AM

## 2023-09-27 NOTE — ED NOTES
Discharge orders received. Discharge instructions provided by ERP. AVS provided and reviewed with patient. Patient AOx4 and ambulatory. No IV placed during ED visit. Patient discharged to self without incident.

## 2023-09-27 NOTE — ED TRIAGE NOTES
Ambulatory to triage with   Chief Complaint   Patient presents with    Headache    Nausea     C/o headache r/t TBI that occurred 1 year ago. C/o continued symptoms.

## 2023-09-27 NOTE — DISCHARGE PLANNING
JOE contacted as pt has concerns about his group home- [facility].  Pt states he wants to go to a new group home as the one he lives in doesn't administer medication on time and they charge pts who aren't their favorite pts $39 for meals sometimes.  JOE consulted with pt and learned [facility], JOE encouraged pt to speak with [facility] to request new placement.  Pt states he has asked [facility] about new placement and has been told there isn't anything else available.  JOE provided pt with AliyahTigerTraderaimundo's office phone number and information so that pt can create formal complaints and get assistance with these things he stated.

## 2023-10-02 ENCOUNTER — HOSPITAL ENCOUNTER (EMERGENCY)
Facility: MEDICAL CENTER | Age: 58
End: 2023-10-02
Attending: EMERGENCY MEDICINE
Payer: MEDICARE

## 2023-10-02 VITALS
HEART RATE: 79 BPM | RESPIRATION RATE: 17 BRPM | BODY MASS INDEX: 23.61 KG/M2 | TEMPERATURE: 97.1 F | OXYGEN SATURATION: 99 % | DIASTOLIC BLOOD PRESSURE: 69 MMHG | HEIGHT: 74 IN | WEIGHT: 184 LBS | SYSTOLIC BLOOD PRESSURE: 119 MMHG

## 2023-10-02 DIAGNOSIS — R51.9 ACUTE NONINTRACTABLE HEADACHE, UNSPECIFIED HEADACHE TYPE: ICD-10-CM

## 2023-10-02 PROCEDURE — 99283 EMERGENCY DEPT VISIT LOW MDM: CPT

## 2023-10-02 PROCEDURE — 700111 HCHG RX REV CODE 636 W/ 250 OVERRIDE (IP): Mod: JZ | Performed by: EMERGENCY MEDICINE

## 2023-10-02 PROCEDURE — 96372 THER/PROPH/DIAG INJ SC/IM: CPT

## 2023-10-02 RX ORDER — KETOROLAC TROMETHAMINE 10 MG/1
10 TABLET, FILM COATED ORAL EVERY 4 HOURS PRN
Qty: 30 TABLET | Refills: 0 | Status: SHIPPED | OUTPATIENT
Start: 2023-10-02 | End: 2024-02-21

## 2023-10-02 RX ORDER — KETOROLAC TROMETHAMINE 30 MG/ML
30 INJECTION, SOLUTION INTRAMUSCULAR; INTRAVENOUS ONCE
Status: COMPLETED | OUTPATIENT
Start: 2023-10-02 | End: 2023-10-02

## 2023-10-02 RX ADMIN — KETOROLAC TROMETHAMINE 30 MG: 30 INJECTION, SOLUTION INTRAMUSCULAR; INTRAVENOUS at 21:02

## 2023-10-02 ASSESSMENT — FIBROSIS 4 INDEX: FIB4 SCORE: 1.63

## 2023-10-03 NOTE — ED NOTES
Assumed care of pt, received bedside report from IRIS Velasco. Safety rounds completed, pt resting comfortably in the room. Provided pt with blankets upon request. Bed in low and locked position and both side rails up for safety.
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
## 2023-10-03 NOTE — ED PROVIDER NOTES
"ED Provider Note    CHIEF COMPLAINT  Chief Complaint   Patient presents with    Medication Refill     \"I'm trying to get someone to treat me for my head injury!\"       EXTERNAL RECORDS REVIEWED  9/27/2023, outpatient urgent care, dental pain.  The same day he came to this emergency department with a headache.  7/17/2023, discharge summary with a hospitalization for chest pain and dehydration    HPI/ROS  LIMITATION TO HISTORY   Select: History of schizophrenia    Haile Chavez is a 58 y.o. male who presents for evaluation of a headache.  He reports his headache has been going on for many years.  He states that the medication they are giving him at his group home is not helping.  He has a history of schizophrenia.  He states that this headache has not changed at all in the past year.  No recent injuries.  Multiple visits for evaluation of this headache most recently about a week ago here in the emergency department.    PAST MEDICAL HISTORY   has a past medical history of Angina, Diabetes (HCC), Hepatitis C (5/24/1996), HTN (hypertension), benign (4/22/2008), Psychiatric disorder, PTSD (post-traumatic stress disorder), Schizo affective schizophrenia (HCC) (4/22/2010), Schizophrenia, schizo-affective type (HCC), and Tuberculosis (4/22/1992).    SURGICAL HISTORY   has a past surgical history that includes gastroscopy with biopsy (9/3/2010); other abdominal surgery (1982); gastroscopy with biopsy (11/7/2010); and other orthopedic surgery (2000).    FAMILY HISTORY  Family History   Problem Relation Age of Onset    Genetic Disorder Neg Hx        SOCIAL HISTORY  Social History     Tobacco Use    Smoking status: Every Day     Current packs/day: 0.50     Average packs/day: 0.5 packs/day for 30.0 years (15.0 ttl pk-yrs)     Types: Cigars, Cigarettes    Smokeless tobacco: Never    Tobacco comments:     \" 5 - 7 cigarettes a day. \"    Vaping Use    Vaping Use: Never used   Substance and Sexual Activity    Alcohol use: Not " "Currently    Drug use: No    Sexual activity: Yes     Partners: Female       CURRENT MEDICATIONS  Home Medications       Reviewed by Kenisha Avila R.N. (Registered Nurse) on 10/02/23 at 1734  Med List Status: Not Addressed     Medication Last Dose Status   CAPLYTA 42 MG Cap  Active   clindamycin (CLEOCIN) 300 MG Cap  Active   divalproex ER (DEPAKOTE ER) 500 MG TABLET SR 24 HR  Active   INVEGA SUSTENNA 234 MG/1.5ML Suspension Prefilled Syringe  Active   lisinopril (PRINIVIL) 10 MG Tab  Active                    ALLERGIES  Allergies   Allergen Reactions    Amoxicillin Nausea and Unspecified     Headache, body aches, restlessness    Hydrocodone Vomiting, Anxiety and Unspecified     Headaches    Hctz Unspecified     Hyponatremia      Nsaids Nausea       PHYSICAL EXAM  VITAL SIGNS: /70   Pulse 81   Temp 36 °C (96.8 °F) (Temporal)   Resp 16   Ht 1.88 m (6' 2\")   Wt 83.5 kg (184 lb)   SpO2 97%   BMI 23.62 kg/m²    Constitutional: Alert in no apparent distress.  Disheveled.  HENT: No signs of significant acute trauma.   Eyes: Conjunctiva normal, non-icteric.   Chest: Normal nonlabored respirations  Skin: No appreciable rash on the exposed skin  Musculoskeletal: No obvious acute trauma appreciated  Neurologic: Alert, no obvious focal deficits noted.      COURSE & MEDICAL DECISION MAKING    ED Observation Status? No; Patient does not meet criteria for ED Observation.     INITIAL ASSESSMENT, COURSE AND PLAN  Care Narrative: This is a 58-year-old schizophrenic with a chronic headache.  Unchanged over the at least the past year, he reports longer.  Many evaluations in the ER for the same, most recently a week ago.  No new trauma.  No focal neurologic complaints or findings on exam.  Most recent CT of the head here in the ER was 8/5/2023.  Negative for acute abnormalities.  At this point, I do not feel as though a repeat head CT is indicated.  He reports an allergy to NSAIDs, received Toradol a week ago, I am " going to repeat a dose of Toradol as he had no adverse reaction to that.  I am also going to prescribe him some Toradol.  At this point, he has no acute injury.  No focal neurologic complaints or findings on exam, no need for further work-up here in the emergency department he will be discharged back to his group home.  Prescription for Toradol    DISPOSITION AND DISCUSSIONS    Escalation of care considered, and ultimately not performed: I did consider obtaining a CT of the head although he had one within the past couple of months, no changes in his symptoms and no other injuries since then.    Barriers to care at this time, including but not limited to:  Patient has baseline psychiatric disease .     FINAL DIAGNOSIS  1. Acute nonintractable headache, unspecified headache type           Electronically signed by: Rhys Pierce M.D., 10/2/2023 7:56 PM

## 2023-10-03 NOTE — ED NOTES
Medicated pt per MAR for pain. Pt discharged independent and ambulatory with steady gait with all belongings to the lobby. Discharge instructions reviewed with pt and pt has no follow up questions. Vitals and condition stable for discharge.

## 2023-11-21 ENCOUNTER — HOSPITAL ENCOUNTER (EMERGENCY)
Facility: MEDICAL CENTER | Age: 58
End: 2023-11-21
Attending: EMERGENCY MEDICINE
Payer: MEDICARE

## 2023-11-21 VITALS
RESPIRATION RATE: 19 BRPM | BODY MASS INDEX: 23.61 KG/M2 | WEIGHT: 184 LBS | HEART RATE: 110 BPM | SYSTOLIC BLOOD PRESSURE: 149 MMHG | TEMPERATURE: 98.8 F | OXYGEN SATURATION: 99 % | DIASTOLIC BLOOD PRESSURE: 91 MMHG | HEIGHT: 74 IN

## 2023-11-21 DIAGNOSIS — S91.009A WOUND OF ANKLE, UNSPECIFIED LATERALITY, INITIAL ENCOUNTER: ICD-10-CM

## 2023-11-21 PROCEDURE — 99283 EMERGENCY DEPT VISIT LOW MDM: CPT

## 2023-11-21 ASSESSMENT — FIBROSIS 4 INDEX: FIB4 SCORE: 1.63
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
## 2023-11-21 NOTE — DISCHARGE INSTRUCTIONS
Patient is medically stable for discharge.    Follow-up with primary care 2 days for reevaluation, wound check, medication management.    Continue any home medication as previously indicated.    Local wound care with gentle cleansing, warm water, soap, pat dry when available.  Apply antibiotic ointment daily, keep covered while in boots, outdoors or active.    Tylenol 1000 mg or Motrin 600 mg every 6 hours as needed for discomfort.    Rest, elevation as needed for any swelling or discomfort.  Otherwise weightbearing and activity as tolerated.    Return to the emergency department for persistent or worsening leg pain, swelling, redness, bleeding or other drainage, wound infection, fever or other new concerns.

## 2023-11-21 NOTE — ED NOTES
"Pt in lobby after being triaged by RN, yelling \"fuck you\" and \"fuck that bitch.\" Advised pt that this is not appropriate behavior. Pt then said \"fuck that nurse then.\" Again advised this pt that yelling profanities in lobby is not acceptable.   "

## 2023-11-21 NOTE — ED NOTES
Pt discharged to home. Discharge paperwork provided. Education provided by ERP. Reinforced discharge instructions.  Pt was given follow up instructions  Pt verbalized understanding of all instructions for discharge.   Patient went out of the ER ambulatory with use of crutches., alert and oriented x 4, with all belongings.
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
## 2023-11-25 ENCOUNTER — HOSPITAL ENCOUNTER (EMERGENCY)
Facility: MEDICAL CENTER | Age: 58
End: 2023-11-25
Attending: STUDENT IN AN ORGANIZED HEALTH CARE EDUCATION/TRAINING PROGRAM
Payer: MEDICARE

## 2023-11-25 VITALS
BODY MASS INDEX: 27.89 KG/M2 | RESPIRATION RATE: 16 BRPM | SYSTOLIC BLOOD PRESSURE: 132 MMHG | HEIGHT: 67 IN | OXYGEN SATURATION: 95 % | HEART RATE: 66 BPM | WEIGHT: 177.69 LBS | TEMPERATURE: 97.1 F | DIASTOLIC BLOOD PRESSURE: 66 MMHG

## 2023-11-25 DIAGNOSIS — G89.29 OTHER CHRONIC PAIN: ICD-10-CM

## 2023-11-25 DIAGNOSIS — N39.498 OTHER URINARY INCONTINENCE: ICD-10-CM

## 2023-11-25 LAB — GLUCOSE BLD STRIP.AUTO-MCNC: 94 MG/DL (ref 65–99)

## 2023-11-25 PROCEDURE — 99283 EMERGENCY DEPT VISIT LOW MDM: CPT

## 2023-11-25 PROCEDURE — 700102 HCHG RX REV CODE 250 W/ 637 OVERRIDE(OP): Performed by: STUDENT IN AN ORGANIZED HEALTH CARE EDUCATION/TRAINING PROGRAM

## 2023-11-25 PROCEDURE — 82962 GLUCOSE BLOOD TEST: CPT

## 2023-11-25 PROCEDURE — A9270 NON-COVERED ITEM OR SERVICE: HCPCS | Performed by: STUDENT IN AN ORGANIZED HEALTH CARE EDUCATION/TRAINING PROGRAM

## 2023-11-25 RX ORDER — ACETAMINOPHEN 500 MG
1000 TABLET ORAL ONCE
Status: COMPLETED | OUTPATIENT
Start: 2023-11-25 | End: 2023-11-25

## 2023-11-25 RX ADMIN — ACETAMINOPHEN 1000 MG: 500 TABLET ORAL at 18:58

## 2023-11-25 ASSESSMENT — PAIN DESCRIPTION - DESCRIPTORS: DESCRIPTORS: ACHING

## 2023-11-25 ASSESSMENT — FIBROSIS 4 INDEX: FIB4 SCORE: 1.63

## 2023-11-26 ENCOUNTER — HOSPITAL ENCOUNTER (EMERGENCY)
Facility: MEDICAL CENTER | Age: 58
End: 2023-11-26
Attending: STUDENT IN AN ORGANIZED HEALTH CARE EDUCATION/TRAINING PROGRAM
Payer: MEDICARE

## 2023-11-26 VITALS
SYSTOLIC BLOOD PRESSURE: 126 MMHG | BODY MASS INDEX: 28.03 KG/M2 | HEIGHT: 66 IN | HEART RATE: 89 BPM | OXYGEN SATURATION: 96 % | TEMPERATURE: 98 F | WEIGHT: 174.38 LBS | RESPIRATION RATE: 16 BRPM | DIASTOLIC BLOOD PRESSURE: 71 MMHG

## 2023-11-26 DIAGNOSIS — F23 ACUTE PSYCHOSIS (HCC): ICD-10-CM

## 2023-11-26 LAB — POC BREATHALIZER: 0 PERCENT (ref 0–0.01)

## 2023-11-26 PROCEDURE — 99284 EMERGENCY DEPT VISIT MOD MDM: CPT

## 2023-11-26 PROCEDURE — A9270 NON-COVERED ITEM OR SERVICE: HCPCS | Performed by: STUDENT IN AN ORGANIZED HEALTH CARE EDUCATION/TRAINING PROGRAM

## 2023-11-26 PROCEDURE — 302970 POC BREATHALIZER: Performed by: STUDENT IN AN ORGANIZED HEALTH CARE EDUCATION/TRAINING PROGRAM

## 2023-11-26 PROCEDURE — 700102 HCHG RX REV CODE 250 W/ 637 OVERRIDE(OP): Performed by: STUDENT IN AN ORGANIZED HEALTH CARE EDUCATION/TRAINING PROGRAM

## 2023-11-26 PROCEDURE — 90791 PSYCH DIAGNOSTIC EVALUATION: CPT

## 2023-11-26 RX ORDER — OLANZAPINE 5 MG/1
10 TABLET ORAL ONCE
Status: COMPLETED | OUTPATIENT
Start: 2023-11-26 | End: 2023-11-26

## 2023-11-26 RX ADMIN — OLANZAPINE 10 MG: 5 TABLET, FILM COATED ORAL at 16:30

## 2023-11-26 ASSESSMENT — FIBROSIS 4 INDEX: FIB4 SCORE: 1.63
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
## 2023-11-26 NOTE — ED NOTES
Bedside report received from off going RN/tech: IRIS Baer, assumed care of patient.  POC discussed with patient. Call light within reach, all needs addressed at this time.       Fall risk interventions in place: Move the patient closer to the nurse's station, Patient's personal possessions are with in their safe reach, and Keep floor surfaces clean and dry (all applicable per [facility] Fall risk assessment)   Continuous monitoring: Pulse Ox or Blood Pressure  IVF/IV medications: Not Applicable   Oxygen: Room Air  Bedside sitter: Not Applicable   Isolation: Not Applicable

## 2023-11-26 NOTE — ED TRIAGE NOTES
"Chief Complaint   Patient presents with    Psych Eval     Paranoid, flight of thought in triage that does not follow logic, states he is a  and receives special palumbo from [place], denies drugs/ETOH, hx schizo-affective     Pt ambulatory to triage for above complaints, VSS on RA, NAD.    Pt returned to lobby. Educated on triage process and to inform staff of any changes.     /78   Pulse 84   Temp 36.8 °C (98.2 °F) (Temporal)   Resp 16   Ht 1.676 m (5' 6\")   Wt 79.1 kg (174 lb 6.1 oz)   SpO2 96%   BMI 28.15 kg/m²     "

## 2023-11-26 NOTE — ED TRIAGE NOTES
"Chief Complaint   Patient presents with    Leg Pain     Pt states he is having bilateral leg pain, pt state he has been walking for a long time and his legs are hurting, states he cannot walk any longer, pt states he has been wearing the depends diaper all day and needs a new one.       /85   Pulse 97   Temp 36.3 °C (97.3 °F) (Temporal)   Resp 16   Ht 1.702 m (5' 7\")   Wt 80.6 kg (177 lb 11.1 oz)   SpO2 97%   BMI 27.83 kg/m²     "

## 2023-11-27 NOTE — DISCHARGE PLANNING
JOE contacted to assist in coordinating transportation home.  JOE performed chart review and assisted RN and CM to speak with [facility]: [phone].  Per , pt is able to return to the home today.  REMSA PCS completed and submitted,  scheduled for 7pm.  RN notified.    [facility] Phone Number: [phone]  [facility] Address:  [address] [place] [address], [address]

## 2023-11-27 NOTE — ED NOTES
Pt transported back to group home by [facility] representatives. Pt verbalized understanding of d/c instructions. Pt left ED ambulatory without assistance with steady gait.

## 2023-11-27 NOTE — ED NOTES
ERP notified pt GCS 14 off for confusion of year/month. ERP gave order pt still to be d/c from ED.

## 2023-11-27 NOTE — ED PROVIDER NOTES
CHIEF COMPLAINT  Chief Complaint   Patient presents with    Psych Eval     Paranoid, flight of thought in triage that does not follow logic, states he is a  and receives special palumbo from [place], denies drugs/ETOH, hx schizo-affective       LIMITATION TO HISTORY   Select: Decompensated psychiatric illness    HPI    Haile Chavez is a 58 y.o. male who presents to the Emergency Department presents for evaluation of paranoia, and tangential thoughts.  History is difficult to obtain, as patient is rambling, stating that he received special palumbo from the upper levels, also stated that people were trying to make him miss appointments, and did admit that he missed his wrists recent Invega injection.  Denies any recent alcohol, or drugs.  Denies any other complaints  OUTSIDE HISTORIAN(S):  Select: None available    EXTERNAL RECORDS REVIEWED  Select: Other review of records from [facility] patient does have a history of schizoaffective      PAST MEDICAL HISTORY  Past Medical History:   Diagnosis Date    Angina     Diabetes (HCC)     Hepatitis C 5/24/1996    HTN (hypertension), benign 4/22/2008    Psychiatric disorder     Schizoaffective    PTSD (post-traumatic stress disorder)     Schizo affective schizophrenia (HCC) 4/22/2010    Schizophrenia, schizo-affective type (HCC)     Tuberculosis 4/22/1992     .    SURGICAL HISTORY  Past Surgical History:   Procedure Laterality Date    GASTROSCOPY WITH BIOPSY  11/7/2010    Performed by MARY SAENZ at ENDOSCOPY [facility] ORS    GASTROSCOPY WITH BIOPSY  9/3/2010    Performed by DIONNE KELLEY at ENDOSCOPY [facility] ORS    OTHER ORTHOPEDIC SURGERY  2000    left ankle repair, total fusion.    OTHER ABDOMINAL SURGERY  1982    hernia repair         FAMILY HISTORY  Family History   Problem Relation Age of Onset    Genetic Disorder Neg Hx           SOCIAL HISTORY  Social History     Socioeconomic History    Marital status: Single     Spouse  "name: Not on file    Number of children: Not on file    Years of education: Not on file    Highest education level: Not on file   Occupational History    Not on file   Tobacco Use    Smoking status: Every Day     Current packs/day: 0.50     Average packs/day: 0.5 packs/day for 30.0 years (15.0 ttl pk-yrs)     Types: Cigars, Cigarettes    Smokeless tobacco: Never    Tobacco comments:     \" 5 - 7 cigarettes a day. \"    Vaping Use    Vaping Use: Never used   Substance and Sexual Activity    Alcohol use: Not Currently    Drug use: No    Sexual activity: Yes     Partners: Female   Other Topics Concern    Not on file   Social History Narrative    ** Merged History Encounter **          Social Determinants of Health     Financial Resource Strain: Not on file   Food Insecurity: Not on file   Transportation Needs: Not on file   Physical Activity: Not on file   Stress: Not on file   Social Connections: Not on file   Intimate Partner Violence: Not on file   Housing Stability: Not on file         CURRENT MEDICATIONS  No current facility-administered medications on file prior to encounter.     Current Outpatient Medications on File Prior to Encounter   Medication Sig Dispense Refill    ketorolac (TORADOL) 10 MG Tab Take 1 Tablet by mouth every four hours as needed (Headache). 30 Tablet 0    lisinopril (PRINIVIL) 10 MG Tab Take 1 Tablet by mouth every day. 30 Tablet 3    CAPLYTA 42 MG Cap Take 42 mg by mouth every day.      INVEGA SUSTENNA 234 MG/1.5ML Suspension Prefilled Syringe Inject 234 mg into the shoulder, thigh, or buttocks every 30 (thirty) days.      divalproex ER (DEPAKOTE ER) 500 MG TABLET SR 24 HR Take 2 Tabs by mouth 2 Times a Day. 30 Tab 5           ALLERGIES  Allergies   Allergen Reactions    Amoxicillin Nausea and Unspecified     Headache, body aches, restlessness    Hydrocodone Vomiting, Anxiety and Unspecified     Headaches    Hctz Unspecified     Hyponatremia      Nsaids Nausea       PHYSICAL EXAM  VITAL " "SIGNS:/78   Pulse 84   Temp 36.8 °C (98.2 °F) (Temporal)   Resp 16   Ht 1.676 m (5' 6\")   Wt 79.1 kg (174 lb 6.1 oz)   SpO2 96%   BMI 28.15 kg/m²       VITALS - vital signs documented prior to this note have been reviewed and noted,  GENERAL - awake, alert, oriented, GCS 15, no apparent distress, non-toxic  appearing  HEENT - normocephalic, atraumatic, pupils equal, sclera anicteric, mucus  membranes moist  NECK - supple, no meningismus, full active range of motion, trachea midline  CARDIOVASCULAR - regular rate/rhythm, no murmurs/gallops/rubs  PULMONARY - no respiratory distress, speaking in full sentences, clear to  auscultation bilaterally, no wheezing/ronchi/rales, no accessory muscle use  GASTROINTESTINAL - soft, non-tender, non-distended, no rebound, guarding,  or peritonitis  GENITOURINARY - Deferred  NEUROLOGIC - Awake alert, normal mental status, speech fluid, cognition  normal, moves all extremities  MUSCULOSKELETAL - no obvious asymmetry or deformities present  EXTREMITIES - warm, well-perfused, no cyanosis or significant edema  DERMATOLOGIC - warm, dry, no rashes, no jaundice  PSYCHIATRIC - tangential paranoid denies SI HI    DIAGNOSTIC STUDIES / PROCEDURES      Radiologist interpretation:   No orders to display        COURSE & MEDICAL DECISION MAKING    ED COURSE:    ED Observation Status?no    Reevaluation at 1706 patient is now awake alert, demonstrates forward thinking, is easily redirectable was seen by the alert team who feels that the patient is at his baseline, and he does have outpatient followed up with psychiatry thus at this point does not meet criteria for legal 2000    INTERVENTIONS BY ME:  Medications   OLANZapine (ZyPREXA) tablet 10 mg (10 mg Oral Given 11/26/23 1630)       Response on recheck: Improved          INITIAL ASSESSMENT, COURSE AND PLAN  Care Narrative: Patient presented for evaluation of rambling and tangential thoughts.  He was given a dose of oral Zyprexa and " observed.  After being observed and evaluated by the alert team the patient's tangential thoughts, paranoia had almost completely resolved he did demonstrate forward thinking was goal-directed he did have access to food shelter and a plan for outpatient psychiatric follow-up thus at this point it did not appear he was in a decompensated psychiatric illness and did not meet criteria for legal 2000.  He was discharged and instructed to fill out follow-up with his regularly scheduled psychiatrist and return with any other new or concerning symptoms we did call his group home who was willing to accept him back.  He was discharged to go back to his group home.             ADDITIONAL PROBLEM LIST    DISPOSITION AND DISCUSSIONS      Discussion of management with other [facility] or appropriate source(s): Behavioral Health seen by alert team patient appears to be at his baseline does not meet criteria after being treated with Zyprexa      Escalation of care considered, and ultimately not performed: Legal 2000 not initiated    Barriers to care at this time, including but not limited to: Patient does not have established PCP.     Decision tools and prescription drugs considered including, but not limited to: Medication modification does have close outpatient follow-up thus this will be deferred .    FINAL DIAGNOSIS  1. Acute psychosis (HCC) Acute            Electronically signed by: Dinh Alan DO ,5:07 PM 11/26/23

## 2023-11-27 NOTE — ED NOTES
This RN spoke with ED case management and ED social work who called pt group home representative at [facility]. The representative stated the patient has been confused for multiple weeks and confirmed the patient has been confused to time/month for multiple weeks. ED  setting up medical transport for pt.

## 2023-11-27 NOTE — CONSULTS
[facility]   TRIAGE ASSESSMENT    Name: Haile Chavez  MRN: [id number]  : 1965  Age: 58 y.o.  Date of assessment: 2023  PCP: Pcp Not In Computer  Persons in attendance: Patient  Patient Location: [facility]    CHIEF COMPLAINT/PRESENTING ISSUE (as stated by patient): Patient is a 58 y.o. male that originally presented to the ED with Paranoid thoughts and rambling speech. Patient given Zyprexa 10 mg at 16:30. At time of consult patient more coherent and answering questions appropriately. Some confusion on date and time. Patient reports a history of Bipolar and Schizophrenia. He states he receives his MH treatment through [facility]. He has housing through [facility]. Patient denies any SI/HI or thoughts of self harm. Patient now asking to be discharged and return to his apartment. Patient appears to be at his baseline and does not meet criteria for legal hold. Findings discussed with ERP and RN.  Chief Complaint   Patient presents with    Psych Eval     Paranoid, flight of thought in triage that does not follow logic, states he is a  and receives special palumbo from the upper levels of the [place], denies drugs/ETOH, hx schizo-affective        CURRENT LIVING SITUATION/SOCIAL SUPPORT/FINANCIAL RESOURCES: patient reports living in permanent [facility] housing. Receives MH care from [facility]. He reports being complaint with his medication    BEHAVIORAL HEALTH/SUBSTANCE USE TREATMENT HISTORY  Does patient/parent report a history of prior behavioral health/substance use treatment for patient?   Yes:    Dates Level of Care Facilty/Provider Diagnosis/Problem Medications   2016 to current Outpatient [facility]  Bipolar, Schizophrenia  [place]        SAFETY ASSESSMENT - SELF  Does patient acknowledge current or past symptoms of dangerousness to self or is previous history noted? No - PER EMR patient has a history of aggression.   Does parent/significant other report patient has current or  past symptoms of dangerousness to self? N\A  Does presenting problem suggest symptoms of dangerousness to self? No    SAFETY ASSESSMENT - OTHERS  Does patient acknowledge current or past symptoms of aggressive behavior or risk to others or is previous history noted? no  Does parent/significant other report patient has current or past symptoms of aggressive behavior or risk to others?  N\A  Does presenting problem suggest symptoms of dangerousness to others? No    LEGAL HISTORY  Does patient acknowledge history of arrest/half-way/senior living or is previous history noted? Not discussed    Crisis Safety Plan completed and copy given to patient? N\A    ABUSE/NEGLECT SCREENING  Does patient report feeling “unsafe” in his/her home, or afraid of anyone?  no  Does patient report any history of physical, sexual, or emotional abuse?  no  Does parent or significant other report any of the above? N\A  Is there evidence of neglect by self?  no  Is there evidence of neglect by a caregiver? no  Does the patient/parent report any history of CPS/APS/police involvement related to suspected abuse/neglect or domestic violence? no  Based on the information provided during the current assessment, is a mandated report of suspected abuse/neglect being made?  No    SUBSTANCE USE SCREENING  Yes:  Zack all substances used in the past 30 days: Denies any use       UDS results: No results  Breathalyzer results: 0.0    MENTAL STATUS   Participation: Active verbal participation and Attentive  Grooming: Casual  Orientation: Alert and Evidence of hallucinations present  Behavior: Calm  Eye contact: Good  Mood: Euthymic  Affect: Flexible and Full range  Thought process: Flight of ideas  Thought content: Within normal limits  Speech: Rate within normal limits and Volume within normal limits  Perception: Evidence of auditory hallucination  Memory:  Poor memory for chronology of events  Insight: Limited  Judgment:  Limited  Other:    Collateral information:    Source:  [] Significant other present in person:   [] Significant other by telephone  [] [facility]   [x] [facility] Nursing Staff  [x] [facility]  [x] Other: ERP    [] Unable to complete full assessment due to:  [] Acute intoxication  [] Patient declined to participate/engage  [] Patient verbally unresponsive  [] Significant cognitive deficits  [] Significant perceptual distortions or behavioral disorganization  [] Other:      CLINICAL IMPRESSIONS:  Primary:  Bipolar  Secondary:  schizophrenia       IDENTIFIED NEEDS/PLAN:  [Trigger DISPOSITION list for any items marked]    []  Imminent safety risk - self [] Imminent safety risk - others   []  Acute substance withdrawal [x]  Psychosis/Impaired reality testing   []  Mood/anxiety []  Substance use/Addictive behavior   []  Maladaptive behaviro []  Parent/child conflict   []  Family/Couples conflict []  Biomedical   []  Housing []  Financial   []   Legal  Occupational/Educational   []  Domestic violence []  Other:     Recommended Plan of Care:  Refer to [facility]  *T[facility] may not be utilized for moderate or high risk patients    Has the Recommended Plan of Care/Level of Observation been reviewed with the patient's assigned nurse? yes    Does patient/parent or guardian express agreement with the above plan? yes    Referral appointment(s) scheduled? no    Alert team only:   I have discussed findings and recommendations with Dr. Gallego who is in agreement with these recommendations.     Referral information sent to the following outpatient community providers :    Referral information sent to the following inpatient community providers :    If applicable : Referred  to  Alert Team for legal hold follow up at (time): SATHYA Dalton R.N.  11/26/2023

## 2024-02-08 ENCOUNTER — HOSPITAL ENCOUNTER (EMERGENCY)
Facility: MEDICAL CENTER | Age: 59
End: 2024-02-08
Attending: EMERGENCY MEDICINE
Payer: MEDICARE

## 2024-02-08 VITALS
RESPIRATION RATE: 18 BRPM | TEMPERATURE: 98.4 F | WEIGHT: 172.62 LBS | BODY MASS INDEX: 27.86 KG/M2 | HEART RATE: 64 BPM | OXYGEN SATURATION: 98 % | DIASTOLIC BLOOD PRESSURE: 66 MMHG | SYSTOLIC BLOOD PRESSURE: 123 MMHG

## 2024-02-08 DIAGNOSIS — G44.209 TENSION HEADACHE: ICD-10-CM

## 2024-02-08 DIAGNOSIS — R11.0 NAUSEA: ICD-10-CM

## 2024-02-08 LAB
ALBUMIN SERPL BCP-MCNC: 3.9 G/DL (ref 3.2–4.9)
ALBUMIN/GLOB SERPL: 1.2 G/DL
ALP SERPL-CCNC: 81 U/L (ref 30–99)
ALT SERPL-CCNC: 8 U/L (ref 2–50)
ANION GAP SERPL CALC-SCNC: 11 MMOL/L (ref 7–16)
AST SERPL-CCNC: 15 U/L (ref 12–45)
BASOPHILS # BLD AUTO: 0.8 % (ref 0–1.8)
BASOPHILS # BLD: 0.04 K/UL (ref 0–0.12)
BILIRUB SERPL-MCNC: 0.3 MG/DL (ref 0.1–1.5)
BUN SERPL-MCNC: 5 MG/DL (ref 8–22)
CALCIUM ALBUM COR SERPL-MCNC: 8.6 MG/DL (ref 8.5–10.5)
CALCIUM SERPL-MCNC: 8.5 MG/DL (ref 8.5–10.5)
CHLORIDE SERPL-SCNC: 107 MMOL/L (ref 96–112)
CO2 SERPL-SCNC: 21 MMOL/L (ref 20–33)
CREAT SERPL-MCNC: 0.86 MG/DL (ref 0.5–1.4)
EOSINOPHIL # BLD AUTO: 0.8 K/UL (ref 0–0.51)
EOSINOPHIL NFR BLD: 16.5 % (ref 0–6.9)
ERYTHROCYTE [DISTWIDTH] IN BLOOD BY AUTOMATED COUNT: 41.5 FL (ref 35.9–50)
GFR SERPLBLD CREATININE-BSD FMLA CKD-EPI: 98 ML/MIN/1.73 M 2
GLOBULIN SER CALC-MCNC: 3.2 G/DL (ref 1.9–3.5)
GLUCOSE SERPL-MCNC: 124 MG/DL (ref 65–99)
HCT VFR BLD AUTO: 38.3 % (ref 42–52)
HGB BLD-MCNC: 13 G/DL (ref 14–18)
IMM GRANULOCYTES # BLD AUTO: 0 K/UL (ref 0–0.11)
IMM GRANULOCYTES NFR BLD AUTO: 0 % (ref 0–0.9)
LIPASE SERPL-CCNC: 54 U/L (ref 11–82)
LYMPHOCYTES # BLD AUTO: 1.97 K/UL (ref 1–4.8)
LYMPHOCYTES NFR BLD: 40.5 % (ref 22–41)
MCH RBC QN AUTO: 29.7 PG (ref 27–33)
MCHC RBC AUTO-ENTMCNC: 33.9 G/DL (ref 32.3–36.5)
MCV RBC AUTO: 87.4 FL (ref 81.4–97.8)
MONOCYTES # BLD AUTO: 0.34 K/UL (ref 0–0.85)
MONOCYTES NFR BLD AUTO: 7 % (ref 0–13.4)
NEUTROPHILS # BLD AUTO: 1.71 K/UL (ref 1.82–7.42)
NEUTROPHILS NFR BLD: 35.2 % (ref 44–72)
NRBC # BLD AUTO: 0 K/UL
NRBC BLD-RTO: 0 /100 WBC (ref 0–0.2)
PLATELET # BLD AUTO: 173 K/UL (ref 164–446)
PMV BLD AUTO: 9.1 FL (ref 9–12.9)
POTASSIUM SERPL-SCNC: 3.9 MMOL/L (ref 3.6–5.5)
PROT SERPL-MCNC: 7.1 G/DL (ref 6–8.2)
RBC # BLD AUTO: 4.38 M/UL (ref 4.7–6.1)
SODIUM SERPL-SCNC: 139 MMOL/L (ref 135–145)
WBC # BLD AUTO: 4.9 K/UL (ref 4.8–10.8)

## 2024-02-08 PROCEDURE — 99283 EMERGENCY DEPT VISIT LOW MDM: CPT

## 2024-02-08 PROCEDURE — 83690 ASSAY OF LIPASE: CPT

## 2024-02-08 PROCEDURE — 85025 COMPLETE CBC W/AUTO DIFF WBC: CPT

## 2024-02-08 PROCEDURE — 36415 COLL VENOUS BLD VENIPUNCTURE: CPT

## 2024-02-08 PROCEDURE — 80053 COMPREHEN METABOLIC PANEL: CPT

## 2024-02-08 RX ORDER — CYCLOBENZAPRINE HCL 5 MG
5-10 TABLET ORAL 3 TIMES DAILY PRN
Qty: 20 TABLET | Refills: 0 | Status: SHIPPED | OUTPATIENT
Start: 2024-02-08

## 2024-02-08 RX ORDER — ONDANSETRON 4 MG/1
4 TABLET, ORALLY DISINTEGRATING ORAL EVERY 6 HOURS PRN
Qty: 10 TABLET | Refills: 0 | Status: SHIPPED | OUTPATIENT
Start: 2024-02-08

## 2024-02-08 ASSESSMENT — FIBROSIS 4 INDEX: FIB4 SCORE: 1.66

## 2024-02-08 NOTE — ED TRIAGE NOTES
Haile Chavez  59 y.o. male  Chief Complaint   Patient presents with    Nausea     Patient reports nausea since 8AM when he ate breakfast. Patient given 4mg Zofran by EMS    Headache     Patient given 600 mg ibuprofen and 1 gram of Tylenol by EMS with no relief       Vitals:    02/08/24 1336   BP: 105/83   Pulse: 61   Resp: 19   Temp: 36.5 °C (97.7 °F)   SpO2: 96%       Triage process explained to patient, apologized for wait time, and returned to lobby.  Pt informed to notify staff of any change in condition.

## 2024-02-08 NOTE — ED PROVIDER NOTES
"Chief Complaint  f/u ADHD, f/u anxiety, and f/u depression    Subjective    History of Present Illness      Patient presents to [facility] for   Pt states that she is here for a f/u with ADHD, anxiety and depression and is doing well with medications. Pt states that her mood is stable.           ADHD/Mood HPI    Visit for:  follow-up. ADHD Most recent visit was 3 months ago.  Interim changes to follow up on today: no change in medication  Work/School Performance:  going well  Cognitive:  able to focus    Behavior  Hyperactivity: is not hyperactive  Impulsivity: no impulsivity  Tasking: able to mult-task    Social  ADHD social/impulsive symptoms:  not impatient    Behavioral health  Behavior: no concerns  Emotional coping: demonstrates feelings of no concerns       Review of Systems    I have reviewed and agree with the HPI and ROS information as above.  Lillian Isbell, ROC     Objective   Vital Signs:   /90   Pulse 113   Temp 97.1 °F (36.2 °C)   Resp 20   Ht 170.2 cm (67\")   Wt 104 kg (229 lb)   BMI 35.87 kg/m²       Physical Exam  Vitals and nursing note reviewed.   Constitutional:       Appearance: Normal appearance. She is well-developed. She is obese.   HENT:      Head: Normocephalic and atraumatic.      Right Ear: Tympanic membrane, ear canal and external ear normal.      Left Ear: Tympanic membrane, ear canal and external ear normal.      Nose: Nose normal. No septal deviation, nasal tenderness or congestion.      Mouth/Throat:      Lips: Pink. No lesions.      Mouth: Mucous membranes are moist. No oral lesions.      Dentition: Normal dentition.      Pharynx: Oropharynx is clear. No pharyngeal swelling, oropharyngeal exudate or posterior oropharyngeal erythema.   Eyes:      General: Lids are normal. Vision grossly intact. No scleral icterus.        Right eye: No discharge.         Left eye: No discharge.      Extraocular Movements: Extraocular movements intact. " ER Provider Note    Scribed for Jay Wheeler M.D. by Umesh Blackman. 2/8/2024   2:52 PM    Primary Care Provider: Pcp Not In Computer    CHIEF COMPLAINT  Chief Complaint   Patient presents with    Nausea     Patient reports nausea since 8AM when he ate breakfast. Patient given 4mg Zofran by EMS    Headache     Patient given 600 mg ibuprofen and 1 gram of Tylenol by EMS with no relief     EXTERNAL RECORDS REVIEWED  Outpatient Notes The patient has a history of diabetes and schizophrenia    HPI/ROS  LIMITATION TO HISTORY   Select: : None    OUTSIDE HISTORIAN(S):  None    Haile Chavez is a 59 y.o. male who presents to the ED via EMS for evaluation of nausea and abdominal pain, onset yesterday. The patient states yesterday he began feeling nauseated after eating. This persisted today and caused him to begin experiencing abdominal pain. He also reports associated symptoms of a headache with pain localized to the right side. This prompted him to call EMS. He was given Zofran 4 mg, Tylenol 1 g, and Ibuprofen 600 mg en route with moderate alleviation. He also reports associated symptoms of a runny nose. He denies any cough or shortness of breath. He also denies any recent falls. There are no other known alleviating or exacerbating factors.      PAST MEDICAL HISTORY  Past Medical History:   Diagnosis Date    Angina     Diabetes (HCC)     Hepatitis C 5/24/1996    HTN (hypertension), benign 4/22/2008    Psychiatric disorder     Schizoaffective    PTSD (post-traumatic stress disorder)     Schizo affective schizophrenia (HCC) 4/22/2010    Schizophrenia, schizo-affective type (HCC)     Tuberculosis 4/22/1992       SURGICAL HISTORY  Past Surgical History:   Procedure Laterality Date    GASTROSCOPY WITH BIOPSY  11/7/2010    Performed by MARY SAENZ at ENDOSCOPY [facility] ORS    GASTROSCOPY WITH BIOPSY  9/3/2010    Performed by DIONNE KELLEY at ENDOSCOPY [facility] ORS    OTHER ORTHOPEDIC SURGERY  2000    left       Conjunctiva/sclera: Conjunctivae normal.      Right eye: Right conjunctiva is not injected.      Left eye: Left conjunctiva is not injected.      Pupils: Pupils are equal, round, and reactive to light.   Neck:      Thyroid: No thyroid mass.      Trachea: Trachea normal.   Cardiovascular:      Rate and Rhythm: Normal rate and regular rhythm.      Heart sounds: Normal heart sounds. No murmur heard.  No gallop.    Pulmonary:      Effort: Pulmonary effort is normal.      Breath sounds: Normal breath sounds and air entry. No wheezing, rhonchi or rales.   Musculoskeletal:         General: No tenderness or deformity. Normal range of motion.      Cervical back: Full passive range of motion without pain, normal range of motion and neck supple.      Thoracic back: Normal.      Right lower leg: No edema.      Left lower leg: No edema.   Skin:     General: Skin is warm and dry.      Coloration: Skin is not jaundiced.      Findings: No rash.   Neurological:      Mental Status: She is alert and oriented to person, place, and time.      Cranial Nerves: Cranial nerves are intact.      Sensory: Sensation is intact.      Motor: Motor function is intact.      Coordination: Coordination is intact.      Gait: Gait is intact.      Deep Tendon Reflexes: Reflexes are normal and symmetric.   Psychiatric:         Mood and Affect: Mood and affect normal.         Behavior: Behavior normal.         Judgment: Judgment normal.          Result Review  Data Reviewed:                   Assessment and Plan      Problem List Items Addressed This Visit        Endocrine and Metabolic    Class 1 obesity with body mass index (BMI) of 33.0 to 33.9 in adult    Current Assessment & Plan     Patient's (Body mass index is 35.87 kg/m².) indicates that they are obese (BMI >30) with health conditions that include none . Weight is worsening. BMI is is above average; BMI management plan is completed. We discussed portion control and increasing exercise.        ankle repair, total fusion.    OTHER ABDOMINAL SURGERY  1982    hernia repair       FAMILY HISTORY  Family History   Problem Relation Age of Onset    Genetic Disorder Neg Hx        SOCIAL HISTORY   reports that he has been smoking cigars and cigarettes. He has a 15.0 pack-year smoking history. He has never used smokeless tobacco. He reports that he does not currently use alcohol. He reports that he does not use drugs.    CURRENT MEDICATIONS  Previous Medications    CAPLYTA 42 MG CAP    Take 42 mg by mouth every day.    DIVALPROEX ER (DEPAKOTE ER) 500 MG TABLET SR 24 HR    Take 2 Tabs by mouth 2 Times a Day.    INVEGA SUSTENNA 234 MG/1.5ML SUSPENSION PREFILLED SYRINGE    Inject 234 mg into the shoulder, thigh, or buttocks every 30 (thirty) days.    KETOROLAC (TORADOL) 10 MG TAB    Take 1 Tablet by mouth every four hours as needed (Headache).    LISINOPRIL (PRINIVIL) 10 MG TAB    Take 1 Tablet by mouth every day.       ALLERGIES  Allergies   Allergen Reactions    Amoxicillin Nausea and Unspecified     Headache, body aches, restlessness    Hydrocodone Vomiting, Anxiety and Unspecified     Headaches    Hctz Unspecified     Hyponatremia      Nsaids Nausea        PHYSICAL EXAM  /83   Pulse 61   Temp 36.5 °C (97.7 °F) (Temporal)   Resp 19   Wt 78.3 kg (172 lb 9.9 oz)   SpO2 96%   BMI 27.86 kg/m²    Constitutional: Well developed, Well nourished, Mild distress,    HENT: Normocephalic, Atraumatic, Poor dentition  Eyes: PERRLA, EOMI, Conjunctiva normal, No discharge.   Neck: Right paraspinal cervical tenderness to palpation, Supple, No stridor.   Cardiovascular: Normal heart rate, Normal rhythm.   Thorax & Lungs: Clear to auscultation bilaterally, No respiratory distress.   Abdomen: Soft, No tenderness, No masses.   Skin: Warm, Dry, No rash.    Musculoskeletal: No major deformities noted.  Neurologic: Awake, alert. Moves all extremities spontaneously.  Psychiatric: Bizarre affect, Judgment normal, Mood normal.         Mental Health    ADHD (attention deficit hyperactivity disorder), combined type - Primary    Relevant Medications    amphetamine-dextroamphetamine (ADDERALL) 10 MG tablet    buPROPion XL (Wellbutrin XL) 300 MG 24 hr tablet    FLUoxetine (PROzac) 20 MG capsule    hydrOXYzine pamoate (Vistaril) 25 MG capsule    Mixed anxiety and depressive disorder    Relevant Medications    amphetamine-dextroamphetamine (ADDERALL) 10 MG tablet    buPROPion XL (Wellbutrin XL) 300 MG 24 hr tablet    FLUoxetine (PROzac) 20 MG capsule    gabapentin (NEURONTIN) 300 MG capsule    hydrOXYzine pamoate (Vistaril) 25 MG capsule        Patient states that she is doing well on her ADHD and mood medications. Denies any concerns today.   UDS is UTD and appropriate.   BP borderline. Continue to monitor.   ADHD Follow up:    Paul report initiated in office and pending. ADRS (Adult ADHD Assessment Form) reviewed in detail, scanned in chart, and has been reviewed at time of appointment.  All questions, including medication and side effects, were discussed in detail at time of patient's visit. Patient will continue same medication which was discussed at today's visit. Patient is to return in 3 month(s).          Follow Up   Return in about 3 months (around 5/18/2022) for ADHD follow up.  Patient was given instructions and counseling regarding her condition or for health maintenance advice. Please see specific information pulled into the AVS if appropriate.            DIAGNOSTIC STUDIES    Labs:   Results for orders placed or performed during the hospital encounter of 02/08/24   CBC WITH DIFFERENTIAL   Result Value Ref Range    WBC 4.9 4.8 - 10.8 K/uL    RBC 4.38 (L) 4.70 - 6.10 M/uL    Hemoglobin 13.0 (L) 14.0 - 18.0 g/dL    Hematocrit 38.3 (L) 42.0 - 52.0 %    MCV 87.4 81.4 - 97.8 fL    MCH 29.7 27.0 - 33.0 pg    MCHC 33.9 32.3 - 36.5 g/dL    RDW 41.5 35.9 - 50.0 fL    Platelet Count 173 164 - 446 K/uL    MPV 9.1 9.0 - 12.9 fL    Neutrophils-Polys 35.20 (L) 44.00 - 72.00 %    Lymphocytes 40.50 22.00 - 41.00 %    Monocytes 7.00 0.00 - 13.40 %    Eosinophils 16.50 (H) 0.00 - 6.90 %    Basophils 0.80 0.00 - 1.80 %    Immature Granulocytes 0.00 0.00 - 0.90 %    Nucleated RBC 0.00 0.00 - 0.20 /100 WBC    Neutrophils (Absolute) 1.71 (L) 1.82 - 7.42 K/uL    Lymphs (Absolute) 1.97 1.00 - 4.80 K/uL    Monos (Absolute) 0.34 0.00 - 0.85 K/uL    Eos (Absolute) 0.80 (H) 0.00 - 0.51 K/uL    Baso (Absolute) 0.04 0.00 - 0.12 K/uL    Immature Granulocytes (abs) 0.00 0.00 - 0.11 K/uL    NRBC (Absolute) 0.00 K/uL   COMP METABOLIC PANEL   Result Value Ref Range    Sodium 139 135 - 145 mmol/L    Potassium 3.9 3.6 - 5.5 mmol/L    Chloride 107 96 - 112 mmol/L    Co2 21 20 - 33 mmol/L    Anion Gap 11.0 7.0 - 16.0    Glucose 124 (H) 65 - 99 mg/dL    Bun 5 (L) 8 - 22 mg/dL    Creatinine 0.86 0.50 - 1.40 mg/dL    Calcium 8.5 8.5 - 10.5 mg/dL    Correct Calcium 8.6 8.5 - 10.5 mg/dL    AST(SGOT) 15 12 - 45 U/L    ALT(SGPT) 8 2 - 50 U/L    Alkaline Phosphatase 81 30 - 99 U/L    Total Bilirubin 0.3 0.1 - 1.5 mg/dL    Albumin 3.9 3.2 - 4.9 g/dL    Total Protein 7.1 6.0 - 8.2 g/dL    Globulin 3.2 1.9 - 3.5 g/dL    A-G Ratio 1.2 g/dL   LIPASE   Result Value Ref Range    Lipase 54 11 - 82 U/L   ESTIMATED GFR   Result Value Ref Range    GFR (CKD-EPI) 98 >60 mL/min/1.73 m 2         COURSE & MEDICAL DECISION MAKING     ED Observation Status? No; Patient does not meet criteria for ED Observation.     INITIAL  ASSESSMENT, COURSE AND PLAN  Differential diagnoses include but not limited to: Viral, Food borne, Tension headache.      Care Narrative: Patient with history of schizophrenia is coming in secondary to nausea and nondescript headache, ambulance give the patient Tylenol ibuprofen, Zofran, the patient is feeling improved, he does have a slight tension headache, will give the patient a prescription for some muscle lectures, give the patient a prescription for Zofran, the patient has a soft benign abdominal examination and benign laboratory test.  I do not believe further imaging is needed at this point in time.  The patient will be discharged home.    2:52 PM - Patient was evaluated at bedside. In triage ordered for CBC with differential, CMP, and Lipase to evaluate. These resulted prior to ERP evaluation. I informed the patient his lab work his reassuring and he is stable for discharge at this time. I also informed the patent I will plan to prescribe Zofran for nausea and a muscle relaxer for his headache which is likely a tension headache. Patient verbalizes understanding and support with my plan of care.    Further discussed plan for discharge; I advised the patient to return to the [facility] ED with any new or worsening symptoms. Patient was given the opportunity for questions. I addressed all questions or concerns and the patient is stable for discharge at this time. Patient verbalizes understanding and support with my plan for discharge.            DISPOSITION AND DISCUSSIONS    I have discussed management of the patient with the following physicians and DONNY's:  None    Discussion of management with other QHP or appropriate source(s): None   Barriers to care at this time, including but not limited to: Patient does not have established PCP.     The patient will return for new or worsening symptoms and is stable at the time of discharge.    The patient is referred to a primary physician for blood pressure management,  diabetic screening, and for all other preventative health concerns.      DISPOSITION:  Patient will be discharged home in stable condition.    FOLLOW UP:  [facility], Emergency Dept  [address]  [place] [address] [phone]    If symptoms worsen      OUTPATIENT MEDICATIONS:  Discharge Medication List as of 2/8/2024  3:15 PM        START taking these medications    Details   ondansetron (ZOFRAN ODT) 4 MG TABLET DISPERSIBLE Take 1 Tablet by mouth every 6 hours as needed for Nausea/Vomiting., Disp-10 Tablet, R-0, Normal      cyclobenzaprine (FLEXERIL) 5 mg tablet Take 1-2 Tablets by mouth 3 times a day as needed for Mild Pain, Moderate Pain or Muscle Spasms., Disp-20 Tablet, R-0, Normal               FINAL DIAGNOSIS  1. Tension headache    2. Nausea         Umesh PAGE (Scribe), am scribing for, and in the presence of, Jay Wheeler M.D..    Electronically signed by: Umesh Blackman ([email or web address]), 2/8/2024    Jay PAGE M.D. personally performed the services described in this documentation, as scribed by Umesh Blackman in my presence, and it is both accurate and complete.      The note accurately reflects work and decisions made by me.  Jay Wheeler M.D.  2/8/2024  5:15 PM

## 2024-02-21 ENCOUNTER — APPOINTMENT (OUTPATIENT)
Dept: RADIOLOGY | Facility: MEDICAL CENTER | Age: 59
End: 2024-02-21
Payer: MEDICARE

## 2024-02-21 ENCOUNTER — HOSPITAL ENCOUNTER (OUTPATIENT)
Facility: MEDICAL CENTER | Age: 59
End: 2024-02-22
Attending: EMERGENCY MEDICINE | Admitting: HOSPITALIST
Payer: MEDICARE

## 2024-02-21 DIAGNOSIS — R07.9 ACUTE CHEST PAIN: ICD-10-CM

## 2024-02-21 LAB
ALBUMIN SERPL BCP-MCNC: 4.4 G/DL (ref 3.2–4.9)
ALBUMIN/GLOB SERPL: 1.3 G/DL
ALP SERPL-CCNC: 104 U/L (ref 30–99)
ALT SERPL-CCNC: 17 U/L (ref 2–50)
ANION GAP SERPL CALC-SCNC: 13 MMOL/L (ref 7–16)
AST SERPL-CCNC: 21 U/L (ref 12–45)
BASOPHILS # BLD AUTO: 0.7 % (ref 0–1.8)
BASOPHILS # BLD: 0.04 K/UL (ref 0–0.12)
BILIRUB SERPL-MCNC: 0.3 MG/DL (ref 0.1–1.5)
BUN SERPL-MCNC: 3 MG/DL (ref 8–22)
CALCIUM ALBUM COR SERPL-MCNC: 9.1 MG/DL (ref 8.5–10.5)
CALCIUM SERPL-MCNC: 9.4 MG/DL (ref 8.5–10.5)
CHLORIDE SERPL-SCNC: 99 MMOL/L (ref 96–112)
CO2 SERPL-SCNC: 24 MMOL/L (ref 20–33)
CREAT SERPL-MCNC: 0.66 MG/DL (ref 0.5–1.4)
EKG IMPRESSION: NORMAL
EOSINOPHIL # BLD AUTO: 0.69 K/UL (ref 0–0.51)
EOSINOPHIL NFR BLD: 11.5 % (ref 0–6.9)
ERYTHROCYTE [DISTWIDTH] IN BLOOD BY AUTOMATED COUNT: 42.9 FL (ref 35.9–50)
FLUAV RNA SPEC QL NAA+PROBE: NEGATIVE
FLUBV RNA SPEC QL NAA+PROBE: NEGATIVE
GFR SERPLBLD CREATININE-BSD FMLA CKD-EPI: 107 ML/MIN/1.73 M 2
GLOBULIN SER CALC-MCNC: 3.5 G/DL (ref 1.9–3.5)
GLUCOSE SERPL-MCNC: 92 MG/DL (ref 65–99)
HCT VFR BLD AUTO: 40.8 % (ref 42–52)
HGB BLD-MCNC: 13.9 G/DL (ref 14–18)
IMM GRANULOCYTES # BLD AUTO: 0.02 K/UL (ref 0–0.11)
IMM GRANULOCYTES NFR BLD AUTO: 0.3 % (ref 0–0.9)
LYMPHOCYTES # BLD AUTO: 1.57 K/UL (ref 1–4.8)
LYMPHOCYTES NFR BLD: 26.1 % (ref 22–41)
MCH RBC QN AUTO: 29.9 PG (ref 27–33)
MCHC RBC AUTO-ENTMCNC: 34.1 G/DL (ref 32.3–36.5)
MCV RBC AUTO: 87.7 FL (ref 81.4–97.8)
MONOCYTES # BLD AUTO: 0.61 K/UL (ref 0–0.85)
MONOCYTES NFR BLD AUTO: 10.1 % (ref 0–13.4)
NEUTROPHILS # BLD AUTO: 3.08 K/UL (ref 1.82–7.42)
NEUTROPHILS NFR BLD: 51.3 % (ref 44–72)
NRBC # BLD AUTO: 0 K/UL
NRBC BLD-RTO: 0 /100 WBC (ref 0–0.2)
PLATELET # BLD AUTO: 223 K/UL (ref 164–446)
PMV BLD AUTO: 9.8 FL (ref 9–12.9)
POTASSIUM SERPL-SCNC: 4.1 MMOL/L (ref 3.6–5.5)
PROT SERPL-MCNC: 7.9 G/DL (ref 6–8.2)
RBC # BLD AUTO: 4.65 M/UL (ref 4.7–6.1)
RSV RNA SPEC QL NAA+PROBE: NEGATIVE
SARS-COV-2 RNA RESP QL NAA+PROBE: NOTDETECTED
SODIUM SERPL-SCNC: 136 MMOL/L (ref 135–145)
TROPONIN T SERPL-MCNC: 6 NG/L (ref 6–19)
TROPONIN T SERPL-MCNC: 7 NG/L (ref 6–19)
TSH SERPL DL<=0.005 MIU/L-ACNC: 2.23 UIU/ML (ref 0.38–5.33)
WBC # BLD AUTO: 6 K/UL (ref 4.8–10.8)

## 2024-02-21 PROCEDURE — 93005 ELECTROCARDIOGRAM TRACING: CPT

## 2024-02-21 PROCEDURE — 84443 ASSAY THYROID STIM HORMONE: CPT

## 2024-02-21 PROCEDURE — 36415 COLL VENOUS BLD VENIPUNCTURE: CPT

## 2024-02-21 PROCEDURE — 99222 1ST HOSP IP/OBS MODERATE 55: CPT

## 2024-02-21 PROCEDURE — 700111 HCHG RX REV CODE 636 W/ 250 OVERRIDE (IP): Mod: JZ

## 2024-02-21 PROCEDURE — G0378 HOSPITAL OBSERVATION PER HR: HCPCS

## 2024-02-21 PROCEDURE — 84484 ASSAY OF TROPONIN QUANT: CPT | Mod: 91

## 2024-02-21 PROCEDURE — 85025 COMPLETE CBC W/AUTO DIFF WBC: CPT

## 2024-02-21 PROCEDURE — 99285 EMERGENCY DEPT VISIT HI MDM: CPT

## 2024-02-21 PROCEDURE — 700102 HCHG RX REV CODE 250 W/ 637 OVERRIDE(OP)

## 2024-02-21 PROCEDURE — 80053 COMPREHEN METABOLIC PANEL: CPT

## 2024-02-21 PROCEDURE — 96372 THER/PROPH/DIAG INJ SC/IM: CPT

## 2024-02-21 PROCEDURE — 93005 ELECTROCARDIOGRAM TRACING: CPT | Performed by: EMERGENCY MEDICINE

## 2024-02-21 PROCEDURE — A9270 NON-COVERED ITEM OR SERVICE: HCPCS

## 2024-02-21 PROCEDURE — 71045 X-RAY EXAM CHEST 1 VIEW: CPT

## 2024-02-21 PROCEDURE — 0241U HCHG SARS-COV-2 COVID-19 NFCT DS RESP RNA 4 TRGT ED POC: CPT

## 2024-02-21 RX ORDER — REGADENOSON 0.08 MG/ML
0.4 INJECTION, SOLUTION INTRAVENOUS
Status: DISCONTINUED | OUTPATIENT
Start: 2024-02-21 | End: 2024-02-22 | Stop reason: HOSPADM

## 2024-02-21 RX ORDER — DIVALPROEX SODIUM 500 MG/1
1000 TABLET, EXTENDED RELEASE ORAL NIGHTLY
COMMUNITY

## 2024-02-21 RX ORDER — NITROGLYCERIN 0.4 MG/1
0.4 TABLET SUBLINGUAL
Status: DISCONTINUED | OUTPATIENT
Start: 2024-02-21 | End: 2024-02-22 | Stop reason: HOSPADM

## 2024-02-21 RX ORDER — CYCLOBENZAPRINE HCL 10 MG
5-10 TABLET ORAL 3 TIMES DAILY PRN
Status: DISCONTINUED | OUTPATIENT
Start: 2024-02-21 | End: 2024-02-22 | Stop reason: HOSPADM

## 2024-02-21 RX ORDER — LISINOPRIL 10 MG/1
10 TABLET ORAL DAILY
Status: DISCONTINUED | OUTPATIENT
Start: 2024-02-22 | End: 2024-02-22 | Stop reason: HOSPADM

## 2024-02-21 RX ORDER — VALBENAZINE 80 MG/1
80 CAPSULE ORAL
COMMUNITY
Start: 2023-11-10

## 2024-02-21 RX ORDER — NICOTINE 21 MG/24HR
21 PATCH, TRANSDERMAL 24 HOURS TRANSDERMAL
Status: DISCONTINUED | OUTPATIENT
Start: 2024-02-21 | End: 2024-02-22 | Stop reason: HOSPADM

## 2024-02-21 RX ORDER — FAMOTIDINE 40 MG/1
40 TABLET, FILM COATED ORAL DAILY
COMMUNITY

## 2024-02-21 RX ORDER — ASPIRIN 81 MG/1
81 TABLET ORAL DAILY
Status: DISCONTINUED | OUTPATIENT
Start: 2024-02-22 | End: 2024-02-22 | Stop reason: HOSPADM

## 2024-02-21 RX ORDER — PROPRANOLOL HYDROCHLORIDE 10 MG/1
10 TABLET ORAL PRN
COMMUNITY

## 2024-02-21 RX ORDER — DIVALPROEX SODIUM 500 MG/1
1000 TABLET, EXTENDED RELEASE ORAL 2 TIMES DAILY
Status: DISCONTINUED | OUTPATIENT
Start: 2024-02-21 | End: 2024-02-22 | Stop reason: HOSPADM

## 2024-02-21 RX ORDER — ONDANSETRON 4 MG/1
4 TABLET, ORALLY DISINTEGRATING ORAL EVERY 6 HOURS PRN
Status: DISCONTINUED | OUTPATIENT
Start: 2024-02-21 | End: 2024-02-22 | Stop reason: HOSPADM

## 2024-02-21 RX ORDER — TAMSULOSIN HYDROCHLORIDE 0.4 MG/1
0.4 CAPSULE ORAL DAILY
COMMUNITY
Start: 2023-11-16

## 2024-02-21 RX ORDER — AMINOPHYLLINE 25 MG/ML
100 INJECTION, SOLUTION INTRAVENOUS
Status: DISCONTINUED | OUTPATIENT
Start: 2024-02-21 | End: 2024-02-22 | Stop reason: HOSPADM

## 2024-02-21 RX ORDER — ENOXAPARIN SODIUM 100 MG/ML
40 INJECTION SUBCUTANEOUS DAILY
Status: DISCONTINUED | OUTPATIENT
Start: 2024-02-21 | End: 2024-02-22 | Stop reason: HOSPADM

## 2024-02-21 RX ORDER — MORPHINE SULFATE 4 MG/ML
2-4 INJECTION INTRAVENOUS
Status: DISCONTINUED | OUTPATIENT
Start: 2024-02-21 | End: 2024-02-22 | Stop reason: HOSPADM

## 2024-02-21 RX ADMIN — NICOTINE TRANSDERMAL SYSTEM 21 MG: 21 PATCH, EXTENDED RELEASE TRANSDERMAL at 18:19

## 2024-02-21 RX ADMIN — ENOXAPARIN SODIUM 40 MG: 100 INJECTION SUBCUTANEOUS at 18:19

## 2024-02-21 RX ADMIN — DIVALPROEX SODIUM 1000 MG: 500 TABLET, EXTENDED RELEASE ORAL at 19:01

## 2024-02-21 RX ADMIN — METOPROLOL TARTRATE 25 MG: 25 TABLET, FILM COATED ORAL at 17:16

## 2024-02-21 ASSESSMENT — LIFESTYLE VARIABLES
ON A TYPICAL DAY WHEN YOU DRINK ALCOHOL HOW MANY DRINKS DO YOU HAVE: 0
HAVE YOU EVER FELT YOU SHOULD CUT DOWN ON YOUR DRINKING: NO
EVER HAD A DRINK FIRST THING IN THE MORNING TO STEADY YOUR NERVES TO GET RID OF A HANGOVER: NO
TOTAL SCORE: 0
HOW MANY TIMES IN THE PAST YEAR HAVE YOU HAD 5 OR MORE DRINKS IN A DAY: 0
ALCOHOL_USE: NO
CONSUMPTION TOTAL: NEGATIVE
TOTAL SCORE: 0
DOES PATIENT WANT TO STOP DRINKING: NO
EVER FELT BAD OR GUILTY ABOUT YOUR DRINKING: NO
TOTAL SCORE: 0
HAVE PEOPLE ANNOYED YOU BY CRITICIZING YOUR DRINKING: NO
AVERAGE NUMBER OF DAYS PER WEEK YOU HAVE A DRINK CONTAINING ALCOHOL: 0

## 2024-02-21 ASSESSMENT — ENCOUNTER SYMPTOMS
DIARRHEA: 0
COUGH: 0
VOMITING: 0
CHILLS: 0
NAUSEA: 0
HEARTBURN: 0
ABDOMINAL PAIN: 0
SHORTNESS OF BREATH: 0
PALPITATIONS: 0
HEADACHES: 0
FEVER: 0
DIZZINESS: 0

## 2024-02-21 ASSESSMENT — PATIENT HEALTH QUESTIONNAIRE - PHQ9
1. LITTLE INTEREST OR PLEASURE IN DOING THINGS: NOT AT ALL
SUM OF ALL RESPONSES TO PHQ9 QUESTIONS 1 AND 2: 0
2. FEELING DOWN, DEPRESSED, IRRITABLE, OR HOPELESS: NOT AT ALL

## 2024-02-21 ASSESSMENT — PAIN DESCRIPTION - PAIN TYPE
TYPE: ACUTE PAIN
TYPE: ACUTE PAIN

## 2024-02-21 ASSESSMENT — FIBROSIS 4 INDEX
FIB4 SCORE: 1.87
FIB4 SCORE: 1.39

## 2024-02-21 NOTE — ED TRIAGE NOTES
Chief Complaint   Patient presents with    Chest Pain     BIB EMS for midsternal CP after smoking a cigarette.       Pt with other complaints of Nausea, headache and bilateral knee pain that he rates as 10/10.  Pt back for EKG. Protocol initiated.  BP (!) 145/87   Pulse 78   Temp 36.6 °C (97.9 °F) (Temporal)   Resp 14   Wt 78 kg (172 lb)   SpO2 99%   Pt informed of wait times. Educated on triage process.  Asked to return to triage RN for any new or worsening of symptoms. Thanked for patience.

## 2024-02-22 ENCOUNTER — APPOINTMENT (OUTPATIENT)
Dept: RADIOLOGY | Facility: MEDICAL CENTER | Age: 59
End: 2024-02-22
Payer: MEDICARE

## 2024-02-22 ENCOUNTER — APPOINTMENT (OUTPATIENT)
Dept: CARDIOLOGY | Facility: MEDICAL CENTER | Age: 59
End: 2024-02-22
Payer: MEDICARE

## 2024-02-22 VITALS
OXYGEN SATURATION: 97 % | HEIGHT: 74 IN | BODY MASS INDEX: 22.32 KG/M2 | SYSTOLIC BLOOD PRESSURE: 135 MMHG | RESPIRATION RATE: 16 BRPM | WEIGHT: 173.94 LBS | DIASTOLIC BLOOD PRESSURE: 74 MMHG | HEART RATE: 67 BPM | TEMPERATURE: 98.8 F

## 2024-02-22 LAB
ANION GAP SERPL CALC-SCNC: 12 MMOL/L (ref 7–16)
APPEARANCE UR: CLEAR
BILIRUB UR QL STRIP.AUTO: NEGATIVE
BUN SERPL-MCNC: 5 MG/DL (ref 8–22)
CALCIUM SERPL-MCNC: 9 MG/DL (ref 8.5–10.5)
CHLORIDE SERPL-SCNC: 101 MMOL/L (ref 96–112)
CHOLEST SERPL-MCNC: 130 MG/DL (ref 100–199)
CO2 SERPL-SCNC: 23 MMOL/L (ref 20–33)
COLOR UR: YELLOW
CREAT SERPL-MCNC: 0.75 MG/DL (ref 0.5–1.4)
ERYTHROCYTE [DISTWIDTH] IN BLOOD BY AUTOMATED COUNT: 42.9 FL (ref 35.9–50)
GFR SERPLBLD CREATININE-BSD FMLA CKD-EPI: 103 ML/MIN/1.73 M 2
GLUCOSE SERPL-MCNC: 89 MG/DL (ref 65–99)
GLUCOSE UR STRIP.AUTO-MCNC: NEGATIVE MG/DL
HCT VFR BLD AUTO: 38.5 % (ref 42–52)
HDLC SERPL-MCNC: 52 MG/DL
HGB BLD-MCNC: 13.3 G/DL (ref 14–18)
KETONES UR STRIP.AUTO-MCNC: NEGATIVE MG/DL
LDLC SERPL CALC-MCNC: 62 MG/DL
LEUKOCYTE ESTERASE UR QL STRIP.AUTO: NEGATIVE
MCH RBC QN AUTO: 30.2 PG (ref 27–33)
MCHC RBC AUTO-ENTMCNC: 34.5 G/DL (ref 32.3–36.5)
MCV RBC AUTO: 87.3 FL (ref 81.4–97.8)
MICRO URNS: NORMAL
NITRITE UR QL STRIP.AUTO: NEGATIVE
PH UR STRIP.AUTO: 7.5 [PH] (ref 5–8)
PLATELET # BLD AUTO: 210 K/UL (ref 164–446)
PMV BLD AUTO: 9.4 FL (ref 9–12.9)
POTASSIUM SERPL-SCNC: 4.2 MMOL/L (ref 3.6–5.5)
PROT UR QL STRIP: NEGATIVE MG/DL
RBC # BLD AUTO: 4.41 M/UL (ref 4.7–6.1)
RBC UR QL AUTO: NEGATIVE
SODIUM SERPL-SCNC: 136 MMOL/L (ref 135–145)
SP GR UR STRIP.AUTO: 1.01
TRIGL SERPL-MCNC: 81 MG/DL (ref 0–149)
UROBILINOGEN UR STRIP.AUTO-MCNC: 0.2 MG/DL
WBC # BLD AUTO: 4.8 K/UL (ref 4.8–10.8)

## 2024-02-22 PROCEDURE — 80048 BASIC METABOLIC PNL TOTAL CA: CPT

## 2024-02-22 PROCEDURE — G0378 HOSPITAL OBSERVATION PER HR: HCPCS

## 2024-02-22 PROCEDURE — A9270 NON-COVERED ITEM OR SERVICE: HCPCS

## 2024-02-22 PROCEDURE — 99239 HOSP IP/OBS DSCHRG MGMT >30: CPT | Performed by: HOSPITALIST

## 2024-02-22 PROCEDURE — 36415 COLL VENOUS BLD VENIPUNCTURE: CPT

## 2024-02-22 PROCEDURE — 700102 HCHG RX REV CODE 250 W/ 637 OVERRIDE(OP)

## 2024-02-22 PROCEDURE — 80061 LIPID PANEL: CPT

## 2024-02-22 PROCEDURE — 85027 COMPLETE CBC AUTOMATED: CPT

## 2024-02-22 PROCEDURE — 81003 URINALYSIS AUTO W/O SCOPE: CPT

## 2024-02-22 RX ADMIN — DIVALPROEX SODIUM 1000 MG: 500 TABLET, EXTENDED RELEASE ORAL at 05:09

## 2024-02-22 RX ADMIN — METOPROLOL TARTRATE 25 MG: 25 TABLET, FILM COATED ORAL at 05:09

## 2024-02-22 RX ADMIN — ASPIRIN 81 MG: 81 TABLET, COATED ORAL at 05:09

## 2024-02-22 RX ADMIN — LISINOPRIL 10 MG: 10 TABLET ORAL at 05:09

## 2024-02-22 RX ADMIN — NICOTINE TRANSDERMAL SYSTEM 21 MG: 21 PATCH, EXTENDED RELEASE TRANSDERMAL at 05:08

## 2024-02-22 NOTE — ED PROVIDER NOTES
"ED Provider Note    CHIEF COMPLAINT  Chief Complaint   Patient presents with    Chest Pain     BIB EMS for midsternal CP after smoking a cigarette.         EXTERNAL RECORDS REVIEWED  Outpatient Notes the patient was seen at his primary care doctor for tooth pain on September 27, 2023    HPI/ROS  LIMITATION TO HISTORY   Select: : None  OUTSIDE HISTORIAN(S):  None    Haile Chavez is a 59 y.o. male who presents with past medical history seen for diabetes, hepatitis, hypertension, schizophrenia, he reports that today he had severe midsternal chest pain that did not radiate.  Currently he says the pain has improved and it is mild now.  He denies    PAST MEDICAL HISTORY   has a past medical history of Angina, Diabetes (HCC), Hepatitis C (5/24/1996), HTN (hypertension), benign (4/22/2008), Psychiatric disorder, PTSD (post-traumatic stress disorder), Schizo affective schizophrenia (HCC) (4/22/2010), Schizophrenia, schizo-affective type (HCC), and Tuberculosis (4/22/1992).    SURGICAL HISTORY   has a past surgical history that includes gastroscopy with biopsy (9/3/2010); other abdominal surgery (1982); gastroscopy with biopsy (11/7/2010); and other orthopedic surgery (2000).    FAMILY HISTORY  Family History   Problem Relation Age of Onset    Genetic Disorder Neg Hx        SOCIAL HISTORY  Social History     Tobacco Use    Smoking status: Every Day     Current packs/day: 0.50     Average packs/day: 0.5 packs/day for 30.0 years (15.0 ttl pk-yrs)     Types: Cigars, Cigarettes    Smokeless tobacco: Never    Tobacco comments:     \" 5 - 7 cigarettes a day. \"    Vaping Use    Vaping Use: Never used   Substance and Sexual Activity    Alcohol use: Not Currently    Drug use: No    Sexual activity: Yes     Partners: Female       CURRENT MEDICATIONS  Home Medications       Reviewed by Romina Leon R.N. (Registered Nurse) on 02/21/24 at 1320  Med List Status: Partial     Medication Last Dose Status   CAPLYTA 42 MG Cap  " Active   cyclobenzaprine (FLEXERIL) 5 mg tablet  Active   divalproex ER (DEPAKOTE ER) 500 MG TABLET SR 24 HR  Active   INVEGA SUSTENNA 234 MG/1.5ML Suspension Prefilled Syringe  Active   ketorolac (TORADOL) 10 MG Tab  Active   lisinopril (PRINIVIL) 10 MG Tab  Active   ondansetron (ZOFRAN ODT) 4 MG TABLET DISPERSIBLE  Active                    ALLERGIES  Allergies   Allergen Reactions    Amoxicillin Nausea and Unspecified     Headache, body aches, restlessness    Hydrocodone Vomiting, Anxiety and Unspecified     Headaches    Hctz Unspecified     Hyponatremia      Nsaids Nausea       PHYSICAL EXAM  VITAL SIGNS: /75   Pulse 78   Temp 36.6 °C (97.9 °F) (Temporal)   Resp 18   Wt 78 kg (172 lb)   SpO2 99%   BMI 27.76 kg/m²    Constitutional: Alert.  HENT: No signs of trauma, Bilateral external ears normal, Nose normal.   Eyes: Pupils are equal and reactive, Conjunctiva normal, Non-icteric.   Neck: Normal range of motion, No tenderness, Supple, No stridor.   Lymphatic: No lymphadenopathy noted.   Cardiovascular: Regular rate and rhythm, no murmurs.   Thorax & Lungs: Normal breath sounds, No respiratory distress, No wheezing, No chest tenderness.   Abdomen: Bowel sounds normal, Soft, No tenderness, No peritoneal signs, No masses, No pulsatile masses.   Skin: Warm, Dry, No erythema, No rash.   Back: No bony tenderness, No CVA tenderness.   Extremities: Intact distal pulses, No edema, No tenderness, No cyanosis.  Musculoskeletal: Good range of motion in all major joints. No tenderness to palpation or major deformities noted.   Neurologic: Alert , Normal motor function, Normal sensory function, No focal deficits noted.   Psychiatric: Affect normal, Judgment normal, Mood normal.       DIAGNOSTIC STUDIES / PROCEDURES  EKG  I have independently interpreted this EKG  Sinus rhythm rate 75  axis normal  intervals normal  Ventricular premature complex  Probable anteroseptal infarct, old  Baseline wander in lead(s)  I,III,aVL  Compared to ECG 07/16/2023 18:48:52  Nonspecific changes.  My impression of this EKG, it does not indicate STEMI criteria at this    LABS  Labs Reviewed   CBC WITH DIFFERENTIAL - Abnormal; Notable for the following components:       Result Value    RBC 4.65 (*)     Hemoglobin 13.9 (*)     Hematocrit 40.8 (*)     Eosinophils 11.50 (*)     Eos (Absolute) 0.69 (*)     All other components within normal limits    Narrative:     Biotin intake of greater than 5 mg per day may interfere with  troponin levels, causing false low values.   COMP METABOLIC PANEL - Abnormal; Notable for the following components:    Bun 3 (*)     Alkaline Phosphatase 104 (*)     All other components within normal limits    Narrative:     Biotin intake of greater than 5 mg per day may interfere with  troponin levels, causing false low values.   TROPONIN    Narrative:     Biotin intake of greater than 5 mg per day may interfere with  troponin levels, causing false low values.   ESTIMATED GFR    Narrative:     Biotin intake of greater than 5 mg per day may interfere with  troponin levels, causing false low values.         RADIOLOGY  I have independently interpreted the diagnostic imaging associated with this visit and am waiting the final reading from the radiologist.   My preliminary interpretation is as follows: Negative chest x-ray  Radiologist interpretation:     DX-CHEST-PORTABLE (1 VIEW)   Final Result      No acute cardiopulmonary disease evident.            COURSE & MEDICAL DECISION MAKING    ED Observation Status? No; Patient does not meet criteria for ED Observation.     INITIAL ASSESSMENT, COURSE AND PLAN  Care Narrative:     The patient presents with chest pain and a history of diabetes.  He smokes cigarettes.  EKG was ordered, labs ordered, chest x-ray was ordered.    The patient's first troponin is normal.  He will be hospitalized for chest pain rule out.        ADDITIONAL PROBLEM LIST  Diabetes, hypertension  DISPOSITION  AND DISCUSSIONS  I have discussed management of the patient with the following physicians and DONNY's: I spoke with the nurse practitioner in the CDU who will assess the patient for hospitalization    Discussion of management with other QHP or appropriate source(s):     None        Barriers to care at this time, including but not limited to:  The patient has schizophrenia .     Decision tools and prescription drugs considered including, but not limited to: HEART Score 4 .        FINAL DIAGNOSIS  1. Acute chest pain           Electronically signed by: Nico Tran M.D., 2/21/2024 4:21 PM

## 2024-02-22 NOTE — CARE PLAN
The patient is Watcher - Medium risk of patient condition declining or worsening    Shift Goals  Clinical Goals: stress and echo in AM  Patient Goals: Eat  Family Goals: N/A    Progress made toward(s) clinical / shift goals:    Problem: Knowledge Deficit - Standard  Goal: Patient and family/care givers will demonstrate understanding of plan of care, disease process/condition, diagnostic tests and medications  Outcome: Progressing     Problem: Pain - Standard  Goal: Alleviation of pain or a reduction in pain to the patient’s comfort goal  Outcome: Progressing       Patient is not progressing towards the following goals:

## 2024-02-22 NOTE — ASSESSMENT & PLAN NOTE
- Continue home Caplyta  - Continue home Depakote  - Patient also takes Invega every 30 days injection

## 2024-02-22 NOTE — H&P
Hospital Medicine History & Physical Note    Date of Service  2/21/2024    Primary Care Physician  Pcp Not In Computer    Consultants  none    Specialist Names: n/a    Code Status  Full Code    Chief Complaint  Chief Complaint   Patient presents with    Chest Pain     BIB EMS for midsternal CP after smoking a cigarette.         History of Presenting Illness  Haile Chavez is a 59 y.o. male who presented 2/21/2024 with severe midsternal chest pain that does not radiate.  He has a past medical history seen for diabetes, hepatitis, hypertension, schizophrenia.  Currently he says the pain has improved and it is mild now. He is also complaining on incontinence and frequency.  In the ED, he is afebrile, blood pressure slightly elevated 145/87, on room air.  Labs largely unremarkable.  Troponin is negative.  Chest x-ray is negative.  EKG is showing sinus rhythm.  Patient will be admitted for chest pain rule out.    I discussed the plan of care with patient and ERP and Dr. Kumari .    Review of Systems  Review of Systems   Constitutional:  Negative for chills, fever and malaise/fatigue.   Respiratory:  Negative for cough and shortness of breath.    Cardiovascular:  Positive for chest pain. Negative for palpitations and leg swelling.   Gastrointestinal:  Negative for abdominal pain, diarrhea, heartburn, nausea and vomiting.   Genitourinary:  Positive for frequency. Negative for dysuria and urgency.   Musculoskeletal:  Positive for joint pain.   Neurological:  Negative for dizziness and headaches.   All other systems reviewed and are negative.      Past Medical History   has a past medical history of Angina, Diabetes (HCC), Hepatitis C (5/24/1996), HTN (hypertension), benign (4/22/2008), Psychiatric disorder, PTSD (post-traumatic stress disorder), Schizo affective schizophrenia (HCC) (4/22/2010), Schizophrenia, schizo-affective type (HCC), and Tuberculosis (4/22/1992).    Surgical History   has a past surgical history  that includes gastroscopy with biopsy (9/3/2010); other abdominal surgery (1982); gastroscopy with biopsy (11/7/2010); and other orthopedic surgery (2000).     Family History  family history is not on file.   Family history reviewed with patient. There is no family history that is pertinent to the chief complaint.     Social History   reports that he has been smoking cigars and cigarettes. He has a 15 pack-year smoking history. He has never used smokeless tobacco. He reports that he does not currently use alcohol. He reports that he does not use drugs.    Allergies  Allergies   Allergen Reactions    Amoxicillin Nausea and Unspecified     Headache, body aches, restlessness    Hydrocodone Vomiting, Anxiety and Unspecified     Headaches    Hctz Unspecified     Hyponatremia      Nsaids Nausea       Medications  Prior to Admission Medications   Prescriptions Last Dose Informant Patient Reported? Taking?   CAPLYTA 42 MG Cap  Patient Yes No   Sig: Take 42 mg by mouth every day.   INVEGA SUSTENNA 234 MG/1.5ML Suspension Prefilled Syringe  Patient Yes No   Sig: Inject 234 mg into the shoulder, thigh, or buttocks every 30 (thirty) days.   cyclobenzaprine (FLEXERIL) 5 mg tablet   No No   Sig: Take 1-2 Tablets by mouth 3 times a day as needed for Mild Pain, Moderate Pain or Muscle Spasms.   divalproex ER (DEPAKOTE ER) 500 MG TABLET SR 24 HR  Patient No No   Sig: Take 2 Tabs by mouth 2 Times a Day.   ketorolac (TORADOL) 10 MG Tab   No No   Sig: Take 1 Tablet by mouth every four hours as needed (Headache).   lisinopril (PRINIVIL) 10 MG Tab   No No   Sig: Take 1 Tablet by mouth every day.   ondansetron (ZOFRAN ODT) 4 MG TABLET DISPERSIBLE   No No   Sig: Take 1 Tablet by mouth every 6 hours as needed for Nausea/Vomiting.      Facility-Administered Medications: None       Physical Exam  Temp:  [36.6 °C (97.9 °F)] 36.6 °C (97.9 °F)  Pulse:  [67-80] 67  Resp:  [14-18] 16  BP: (127-145)/(75-87) 133/81  SpO2:  [97 %-99 %] 97 %  Blood  Pressure: 127/75   Temperature: 36.6 °C (97.9 °F)   Pulse: 78   Respiration: 18   Pulse Oximetry: 99 %       Physical Exam  Vitals and nursing note reviewed.   Constitutional:       General: He is awake.      Appearance: Normal appearance. He is not ill-appearing.   HENT:      Head: Normocephalic and atraumatic.      Jaw: There is normal jaw occlusion.      Right Ear: Hearing normal.      Left Ear: Hearing normal.      Nose: Nose normal.      Mouth/Throat:      Lips: Pink.      Mouth: Mucous membranes are moist.   Eyes:      Extraocular Movements: Extraocular movements intact.      Conjunctiva/sclera: Conjunctivae normal.      Pupils: Pupils are equal, round, and reactive to light.   Neck:      Vascular: No carotid bruit.   Cardiovascular:      Rate and Rhythm: Normal rate. Rhythm irregular.      Pulses: Normal pulses.      Heart sounds: Normal heart sounds, S1 normal and S2 normal.   Pulmonary:      Effort: Pulmonary effort is normal.      Breath sounds: Normal breath sounds and air entry. No stridor.   Musculoskeletal:      Cervical back: Normal range of motion and neck supple.      Right lower leg: No edema.      Left lower leg: No edema.   Skin:     General: Skin is warm and dry.      Capillary Refill: Capillary refill takes less than 2 seconds.   Neurological:      General: No focal deficit present.      Mental Status: He is alert and oriented to person, place, and time. Mental status is at baseline.      Sensory: Sensation is intact.      Motor: Motor function is intact.   Psychiatric:         Attention and Perception: Attention and perception normal.         Mood and Affect: Mood and affect normal.         Speech: Speech normal.         Behavior: Behavior normal. Behavior is cooperative.         Laboratory:  Recent Labs     02/21/24  1329   WBC 6.0   RBC 4.65*   HEMOGLOBIN 13.9*   HEMATOCRIT 40.8*   MCV 87.7   MCH 29.9   MCHC 34.1   RDW 42.9   PLATELETCT 223   MPV 9.8     Recent Labs     02/21/24  1329  "  SODIUM 136   POTASSIUM 4.1   CHLORIDE 99   CO2 24   GLUCOSE 92   BUN 3*   CREATININE 0.66   CALCIUM 9.4     Recent Labs     02/21/24  1329   ALTSGPT 17   ASTSGOT 21   ALKPHOSPHAT 104*   TBILIRUBIN 0.3   GLUCOSE 92         No results for input(s): \"NTPROBNP\" in the last 72 hours.      Recent Labs     02/21/24  1329   TROPONINT 6       Imaging:  DX-CHEST-PORTABLE (1 VIEW)   Final Result      No acute cardiopulmonary disease evident.      NM-CARDIAC STRESS TEST    (Results Pending)   EC-ECHOCARDIOGRAM DOBUTAMINE REST/STRESS W/O CONT    (Results Pending)       X-Ray:  I have personally reviewed the images and compared with prior images.  EKG:  I have personally reviewed the images and compared with prior images.    Assessment/Plan:  Justification for Admission Status  I anticipate this patient is appropriate for observation status at this time because chest pain rule out.    Patient will need a Telemetry bed on EMERGENCY service .  The need is secondary to chest pain rule out.    * Chest pain- (present on admission)  Assessment & Plan  The ASCVD Risk score (Yonis DK, et al., 2019) failed to calculate for the following reasons:    The valid total cholesterol range is 130 to 320 mg/dL  - Patient complaining of acute midsternal chest pain after smoking a cigarette today.  - EKG showing sinus rhythm  - Chest x-ray negative  - Lipid panel in a.m.  - TSH pending  - Troponin negative, continue to trend  - Stress test pending  - Echo pending (last echo July 2023 showing 55% ejection fraction and no valvular abnormalities)      Primary hypertension- (present on admission)  Assessment & Plan  - Check vital signs every 4 hours  - Continue home lisinopril 10 mg daily  - Metoprolol 25 mg twice daily per chest pain protocol      Schizo affective schizophrenia (HCC)- (present on admission)  Assessment & Plan  - Continue home Caplyta  - Continue home Depakote  - Patient also takes Invega every 30 days injection    Tobacco abuse- " (present on admission)  Assessment & Plan  - Smoking cessation encouraged, education provided  - Nicotine patch and Nicorette gum provided        VTE prophylaxis: enoxaparin ppx  Okay lets do

## 2024-02-22 NOTE — ED NOTES
Med Rec complete per patient and pharmacy  Allergies reviewed  Antibiotics in the past 30 days:no  Anticoagulant in past 14 days:no  Pharmacy patient utilizes:[facility] on [address]    Per pharmacy Propranolol 10 mg is prescribed BID however pt states he takes it PRN

## 2024-02-22 NOTE — PROGRESS NOTES
2 RN Skin Assessment Completed by IRIS Temple and Joey ACT-A     Head: WDL  Ears: WDL  Nose: WDL  Mouth: WDL  Neck: WDL  Breasts/Chest: WDL  Shoulder Blades: WDL  Spine: WDL  (R) Arm/Elbow/hand: WDL  (L) Arm/Elbow/hand: WDL  Abdomen:WDL  Groin: WDL  Sacrum/Coccyx/Buttocks: blanching  (R) Leg: WDL  (L) Leg: WDL  (R) Heel/Foot/Toe: blanching  (L) Heel/Foot/Toe: blanching              Devices in place: BP Cuff and Pulse Ox and telemetry box     Interventions in place: Gray ear foams and Pillows     Possible skin injury found: No     Pictures uploaded into Epic: N/A  Wound Consult Placed: N/A

## 2024-02-22 NOTE — PROGRESS NOTES
"Upon entering patients room after report, patient expressed strong desire to leave stating \"I haven't eaten since 6 pm yesterday and Im hungry, [facility] always does this.I want to leave now.\" Patient stated he would walk back to his group home. After educating patient on stress test protocols and discussing the risks of leaving against medical advice, he verbalized understanding and still wished to leave. Charge IRIS Galdamez and MD Nathan Kumari notified. IV removed, tele monitor removed, monitor room notified. Patient escorted to elevators with belongings.  "

## 2024-02-22 NOTE — ASSESSMENT & PLAN NOTE
- Check vital signs every 4 hours  - Continue home lisinopril 10 mg daily  - Metoprolol 25 mg twice daily per chest pain protocol

## 2024-02-22 NOTE — PROGRESS NOTES
Monitor Summary (per monitor room interpretation):  Rhythm: SR  Rate: 66-77  Ectopy: None    .19/.10/.38

## 2024-02-22 NOTE — DISCHARGE SUMMARY
Discharge Summary    CHIEF COMPLAINT ON ADMISSION  Chief Complaint   Patient presents with    Chest Pain     BIB EMS for midsternal CP after smoking a cigarette.         Reason for Admission  CP     Admission Date  2/21/2024    CODE STATUS  Full Code    HPI & HOSPITAL COURSE  Haile Chavez is a 59 y.o. male who presented 2/21/2024 with severe midsternal chest pain that does not radiate.  He has a past medical history seen for diabetes, hepatitis, hypertension, schizophrenia.  Currently he says the pain has improved and it is mild now. He is also complaining on incontinence and frequency.  In the ED, he is afebrile, blood pressure slightly elevated 145/87, on room air.  Labs largely unremarkable.  Troponin is negative.  Chest x-ray is negative.  EKG is showing sinus rhythm.  Patient will be admitted for chest pain rule out.    Patient monitored on telemetry there is no evidence of arrhythmia.    Patient's troponins were negative..  The plan was for patient to go for a Persantine stress test however patient did not want to wait for any further testing and he left AGAINST MEDICAL ADVICE.      Therefore, he is discharged in good and stable condition against medcial advice.    The patient recovered much more quickly than anticipated on admission.    Discharge Date  2/22/2024    FOLLOW UP ITEMS POST DISCHARGE      DISCHARGE DIAGNOSES  Principal Problem:    Chest pain (POA: Yes)  Active Problems:    Schizo affective schizophrenia (HCC) (POA: Yes)    Primary hypertension (POA: Yes)    Tobacco abuse (POA: Yes)  Resolved Problems:    * No resolved hospital problems. *      FOLLOW UP  No future appointments.  No follow-up provider specified.    MEDICATIONS ON DISCHARGE     Medication List        ASK your doctor about these medications        Instructions   Caplyta 42 MG Caps  Generic drug: Lumateperone Tosylate   Take 42 mg by mouth every day.  Dose: 42 mg     cyclobenzaprine 5 mg tablet  Commonly known as: Flexeril    Take 1-2 Tablets by mouth 3 times a day as needed for Mild Pain, Moderate Pain or Muscle Spasms.  Dose: 5-10 mg     Depakote  MG Tb24  Generic drug: divalproex ER  Ask about: Which instructions should I use?   Take 1,000 mg by mouth every evening. 1,000 mg = 2 tabs  Dose: 1,000 mg     famotidine 40 MG Tabs  Commonly known as: Pepcid   Take 40 mg by mouth every day.  Dose: 40 mg     Ingrezza 80 MG Caps  Generic drug: Valbenazine Tosylate   Take 80 mg by mouth every day.  Dose: 80 mg     Invega Sustenna 234 MG/1.5ML Leonor  Generic drug: paliperidone palmitate ER   Inject 234 mg into the shoulder, thigh, or buttocks every 30 (thirty) days.  Dose: 234 mg     lisinopril 10 MG Tabs  Commonly known as: Prinivil   Take 1 Tablet by mouth every day.  Dose: 10 mg     ondansetron 4 MG Tbdp  Commonly known as: Zofran ODT   Take 1 Tablet by mouth every 6 hours as needed for Nausea/Vomiting.  Dose: 4 mg     propranolol 10 MG Tabs  Commonly known as: Inderal   Take 10 mg by mouth as needed.  Dose: 10 mg     tamsulosin 0.4 MG capsule  Commonly known as: Flomax   Take 0.4 mg by mouth every day.  Dose: 0.4 mg              Allergies  Allergies   Allergen Reactions    Amoxicillin Nausea and Unspecified     Headache, body aches, restlessness    Hydrocodone Vomiting, Anxiety and Unspecified     Headaches    Hctz Unspecified     Hyponatremia      Nsaids Nausea       DIET  Orders Placed This Encounter   Procedures    Diet NPO Restrict to: Sips with Medications     Standing Status:   Standing     Number of Occurrences:   8     Order Specific Question:   Diet NPO Restrict to:     Answer:   Sips with Medications [3]       ACTIVITY  As tolerated.  Weight bearing as tolerated    CONSULTATIONS      PROCEDURES      LABORATORY  Lab Results   Component Value Date    SODIUM 136 02/22/2024    POTASSIUM 4.2 02/22/2024    CHLORIDE 101 02/22/2024    CO2 23 02/22/2024    GLUCOSE 89 02/22/2024    BUN 5 (L) 02/22/2024    CREATININE 0.75 02/22/2024     CREATININE 0.9 09/02/2006        Lab Results   Component Value Date    WBC 4.8 02/22/2024    HEMOGLOBIN 13.3 (L) 02/22/2024    HEMATOCRIT 38.5 (L) 02/22/2024    PLATELETCT 210 02/22/2024        Total time of the discharge process exceeds 36 minutes.

## 2024-02-22 NOTE — ASSESSMENT & PLAN NOTE
The ASCVD Risk score (Yonis WEINER, et al., 2019) failed to calculate for the following reasons:    The valid total cholesterol range is 130 to 320 mg/dL  - Patient complaining of acute midsternal chest pain after smoking a cigarette today.  - EKG showing sinus rhythm  - Chest x-ray negative  - Lipid panel in a.m.  - TSH pending  - Troponin negative, continue to trend  - Stress test pending  - Echo pending (last echo July 2023 showing 55% ejection fraction and no valvular abnormalities)

## 2024-02-22 NOTE — CARE PLAN
The patient is Stable - Low risk of patient condition declining or worsening    Shift Goals  Clinical Goals: tele monitor, stress and echo in AM  Patient Goals: Eat  Family Goals: N/A    Progress made toward(s) clinical / shift goals:    Problem: Knowledge Deficit - Standard  Goal: Patient and family/care givers will demonstrate understanding of plan of care, disease process/condition, diagnostic tests and medications  Outcome: Progressing     Problem: Pain - Standard  Goal: Alleviation of pain or a reduction in pain to the patient’s comfort goal  Outcome: Progressing       Patient is not progressing towards the following goals:

## 2024-04-20 ENCOUNTER — HOSPITAL ENCOUNTER (EMERGENCY)
Facility: MEDICAL CENTER | Age: 59
End: 2024-04-20
Attending: EMERGENCY MEDICINE
Payer: MEDICARE

## 2024-04-20 ENCOUNTER — APPOINTMENT (OUTPATIENT)
Dept: RADIOLOGY | Facility: MEDICAL CENTER | Age: 59
End: 2024-04-20
Attending: EMERGENCY MEDICINE
Payer: MEDICARE

## 2024-04-20 VITALS
TEMPERATURE: 98.7 F | SYSTOLIC BLOOD PRESSURE: 182 MMHG | BODY MASS INDEX: 23.09 KG/M2 | HEART RATE: 86 BPM | HEIGHT: 74 IN | DIASTOLIC BLOOD PRESSURE: 95 MMHG | WEIGHT: 179.9 LBS | RESPIRATION RATE: 18 BRPM | OXYGEN SATURATION: 98 %

## 2024-04-20 PROCEDURE — 99281 EMR DPT VST MAYX REQ PHY/QHP: CPT

## 2024-04-20 ASSESSMENT — FIBROSIS 4 INDEX: FIB4 SCORE: 1.48
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
## 2024-04-20 NOTE — ED NOTES
Haile Chavez not in room, pati left on [facility]. Patient walked out refusing to speak to this RN or ERP. Patient left AMA

## 2024-04-20 NOTE — ED TRIAGE NOTES
"Chief Complaint   Patient presents with    Lump     Patient reports lump on center of forehead.      Patient talking to self in triage. Patient states, \" the screen keeps going blank because you don't have a right to read it.\" Patient requesting \"8 tubes of blood of NNAMS.\" Patient denies SI/HI   "

## 2024-07-10 ENCOUNTER — APPOINTMENT (OUTPATIENT)
Dept: URGENT CARE | Facility: CLINIC | Age: 59
End: 2024-07-10
Payer: MEDICARE

## 2024-10-14 NOTE — TELEPHONE ENCOUNTER
We need to get the EEG from [facility] please.   Patient requests all Lab, Cardiology, and Radiology Results on their Discharge Instructions

## 2025-01-20 ENCOUNTER — HOSPITAL ENCOUNTER (EMERGENCY)
Facility: MEDICAL CENTER | Age: 60
End: 2025-01-20
Attending: STUDENT IN AN ORGANIZED HEALTH CARE EDUCATION/TRAINING PROGRAM
Payer: MEDICARE

## 2025-01-20 VITALS
TEMPERATURE: 98 F | OXYGEN SATURATION: 98 % | SYSTOLIC BLOOD PRESSURE: 145 MMHG | HEART RATE: 94 BPM | BODY MASS INDEX: 25.05 KG/M2 | HEIGHT: 73 IN | WEIGHT: 189 LBS | RESPIRATION RATE: 15 BRPM | DIASTOLIC BLOOD PRESSURE: 99 MMHG

## 2025-01-20 DIAGNOSIS — F22 PARANOID (HCC): ICD-10-CM

## 2025-01-20 PROCEDURE — 90791 PSYCH DIAGNOSTIC EVALUATION: CPT

## 2025-01-20 PROCEDURE — 700102 HCHG RX REV CODE 250 W/ 637 OVERRIDE(OP): Performed by: STUDENT IN AN ORGANIZED HEALTH CARE EDUCATION/TRAINING PROGRAM

## 2025-01-20 PROCEDURE — 99285 EMERGENCY DEPT VISIT HI MDM: CPT

## 2025-01-20 PROCEDURE — A9270 NON-COVERED ITEM OR SERVICE: HCPCS | Performed by: STUDENT IN AN ORGANIZED HEALTH CARE EDUCATION/TRAINING PROGRAM

## 2025-01-20 RX ORDER — LORAZEPAM 2 MG/1
2 TABLET ORAL ONCE
Status: COMPLETED | OUTPATIENT
Start: 2025-01-20 | End: 2025-01-20

## 2025-01-20 RX ORDER — OLANZAPINE 5 MG/1
10 TABLET, ORALLY DISINTEGRATING ORAL ONCE
Status: COMPLETED | OUTPATIENT
Start: 2025-01-20 | End: 2025-01-20

## 2025-01-20 RX ADMIN — LORAZEPAM 2 MG: 2 TABLET ORAL at 15:58

## 2025-01-20 RX ADMIN — OLANZAPINE 10 MG: 5 TABLET, ORALLY DISINTEGRATING ORAL at 15:59

## 2025-01-20 ASSESSMENT — FIBROSIS 4 INDEX: FIB4 SCORE: 1.55
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
## 2025-01-20 NOTE — ED NOTES
Pt resting in [facility] in direct view of . Belongings (3 bags) removed and searched and placed in locker 35. Alert team to evaluate.

## 2025-01-21 NOTE — DISCHARGE INSTRUCTIONS
As we discussed you should take your prescribed psychiatric medication.  If you have thoughts of hurting yourself or others please return to the emergency room.
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
## 2025-02-21 NOTE — ED NOTES
Med rec updated per pt at bedside  Allergies reviewed  No ABX in last month  Pt unsure of some of the dosages of his medications. Will follow up with pharmacy in the morning    Event Note